# Patient Record
Sex: MALE | Race: WHITE | NOT HISPANIC OR LATINO | Employment: OTHER | URBAN - METROPOLITAN AREA
[De-identification: names, ages, dates, MRNs, and addresses within clinical notes are randomized per-mention and may not be internally consistent; named-entity substitution may affect disease eponyms.]

---

## 2018-01-09 ENCOUNTER — HOSPITAL ENCOUNTER (OUTPATIENT)
Dept: LAB | Facility: MEDICAL CENTER | Age: 53
End: 2018-01-09
Attending: PHYSICIAN ASSISTANT
Payer: COMMERCIAL

## 2018-01-09 ENCOUNTER — OFFICE VISIT (OUTPATIENT)
Dept: MEDICAL GROUP | Age: 53
End: 2018-01-09
Payer: COMMERCIAL

## 2018-01-09 VITALS
BODY MASS INDEX: 32.64 KG/M2 | TEMPERATURE: 97.4 F | DIASTOLIC BLOOD PRESSURE: 78 MMHG | OXYGEN SATURATION: 94 % | HEART RATE: 78 BPM | WEIGHT: 241 LBS | SYSTOLIC BLOOD PRESSURE: 124 MMHG | HEIGHT: 72 IN

## 2018-01-09 DIAGNOSIS — Z13.220 LIPID SCREENING: ICD-10-CM

## 2018-01-09 DIAGNOSIS — R10.13 DYSPEPSIA: ICD-10-CM

## 2018-01-09 DIAGNOSIS — Z00.00 WELL ADULT EXAM: ICD-10-CM

## 2018-01-09 DIAGNOSIS — Z13.1 DIABETES MELLITUS SCREENING: ICD-10-CM

## 2018-01-09 DIAGNOSIS — Z23 NEED FOR VACCINATION: ICD-10-CM

## 2018-01-09 DIAGNOSIS — Z12.5 SCREENING FOR PROSTATE CANCER: ICD-10-CM

## 2018-01-09 DIAGNOSIS — Z12.11 COLON CANCER SCREENING: ICD-10-CM

## 2018-01-09 DIAGNOSIS — R14.0 BLOATING: ICD-10-CM

## 2018-01-09 DIAGNOSIS — R63.5 WEIGHT GAIN: ICD-10-CM

## 2018-01-09 DIAGNOSIS — E66.9 OBESITY (BMI 30-39.9): ICD-10-CM

## 2018-01-09 DIAGNOSIS — R35.89 POLYURIA: ICD-10-CM

## 2018-01-09 LAB
APPEARANCE UR: CLEAR
BASOPHILS # BLD AUTO: 0.8 % (ref 0–1.8)
BASOPHILS # BLD: 0.05 K/UL (ref 0–0.12)
BILIRUB UR STRIP-MCNC: NEGATIVE MG/DL
COLOR UR AUTO: NORMAL
EOSINOPHIL # BLD AUTO: 0.22 K/UL (ref 0–0.51)
EOSINOPHIL NFR BLD: 3.7 % (ref 0–6.9)
ERYTHROCYTE [DISTWIDTH] IN BLOOD BY AUTOMATED COUNT: 39.5 FL (ref 35.9–50)
GLUCOSE UR STRIP.AUTO-MCNC: NEGATIVE MG/DL
HCT VFR BLD AUTO: 48.9 % (ref 42–52)
HGB BLD-MCNC: 16.1 G/DL (ref 14–18)
IMM GRANULOCYTES # BLD AUTO: 0.01 K/UL (ref 0–0.11)
IMM GRANULOCYTES NFR BLD AUTO: 0.2 % (ref 0–0.9)
KETONES UR STRIP.AUTO-MCNC: NEGATIVE MG/DL
LEUKOCYTE ESTERASE UR QL STRIP.AUTO: NEGATIVE
LYMPHOCYTES # BLD AUTO: 1.62 K/UL (ref 1–4.8)
LYMPHOCYTES NFR BLD: 27.4 % (ref 22–41)
MCH RBC QN AUTO: 29 PG (ref 27–33)
MCHC RBC AUTO-ENTMCNC: 32.9 G/DL (ref 33.7–35.3)
MCV RBC AUTO: 87.9 FL (ref 81.4–97.8)
MONOCYTES # BLD AUTO: 0.58 K/UL (ref 0–0.85)
MONOCYTES NFR BLD AUTO: 9.8 % (ref 0–13.4)
NEUTROPHILS # BLD AUTO: 3.43 K/UL (ref 1.82–7.42)
NEUTROPHILS NFR BLD: 58.1 % (ref 44–72)
NITRITE UR QL STRIP.AUTO: NEGATIVE
NRBC # BLD AUTO: 0 K/UL
NRBC BLD-RTO: 0 /100 WBC
PH UR STRIP.AUTO: 6 [PH] (ref 5–8)
PLATELET # BLD AUTO: 213 K/UL (ref 164–446)
PMV BLD AUTO: 10.5 FL (ref 9–12.9)
PROT UR QL STRIP: NEGATIVE MG/DL
PSA SERPL-MCNC: 0.7 NG/ML (ref 0–4)
RBC # BLD AUTO: 5.56 M/UL (ref 4.7–6.1)
RBC UR QL AUTO: NEGATIVE
SP GR UR STRIP.AUTO: 1.03
TSH SERPL DL<=0.005 MIU/L-ACNC: 3.63 UIU/ML (ref 0.38–5.33)
UROBILINOGEN UR STRIP-MCNC: NEGATIVE MG/DL
WBC # BLD AUTO: 5.9 K/UL (ref 4.8–10.8)

## 2018-01-09 PROCEDURE — 83013 H PYLORI (C-13) BREATH: CPT

## 2018-01-09 PROCEDURE — 85025 COMPLETE CBC W/AUTO DIFF WBC: CPT

## 2018-01-09 PROCEDURE — 90715 TDAP VACCINE 7 YRS/> IM: CPT | Performed by: PHYSICIAN ASSISTANT

## 2018-01-09 PROCEDURE — 99396 PREV VISIT EST AGE 40-64: CPT | Mod: 25 | Performed by: PHYSICIAN ASSISTANT

## 2018-01-09 PROCEDURE — 36415 COLL VENOUS BLD VENIPUNCTURE: CPT

## 2018-01-09 PROCEDURE — 84443 ASSAY THYROID STIM HORMONE: CPT

## 2018-01-09 PROCEDURE — 80061 LIPID PANEL: CPT

## 2018-01-09 PROCEDURE — 81002 URINALYSIS NONAUTO W/O SCOPE: CPT | Performed by: PHYSICIAN ASSISTANT

## 2018-01-09 PROCEDURE — 84153 ASSAY OF PSA TOTAL: CPT

## 2018-01-09 PROCEDURE — 90471 IMMUNIZATION ADMIN: CPT | Performed by: PHYSICIAN ASSISTANT

## 2018-01-09 PROCEDURE — 80053 COMPREHEN METABOLIC PANEL: CPT

## 2018-01-09 PROCEDURE — 90472 IMMUNIZATION ADMIN EACH ADD: CPT | Performed by: PHYSICIAN ASSISTANT

## 2018-01-09 PROCEDURE — 90686 IIV4 VACC NO PRSV 0.5 ML IM: CPT | Performed by: PHYSICIAN ASSISTANT

## 2018-01-09 RX ORDER — FLUTICASONE PROPIONATE 50 MCG
1 SPRAY, SUSPENSION (ML) NASAL DAILY
COMMUNITY
End: 2020-09-26

## 2018-01-09 ASSESSMENT — PATIENT HEALTH QUESTIONNAIRE - PHQ9: CLINICAL INTERPRETATION OF PHQ2 SCORE: 0

## 2018-01-09 NOTE — PROGRESS NOTES
"Subjective:     CC:   Chief Complaint   Patient presents with   • Annual Exam     Would like blood work ordered       HPI:   Zachery Esquivel is a 52 y.o. male who presents for an annual exam    Last colonoscopy: 2000--normal  Last Tdap:  ? Would like today  Regular exercise: Reports he is exercising 4 days a week--- hiking 3 mile loop. During the spring and summer he does it every night. Hunts and fishes every weekend.   Diet: \"I have a problem with sugar.\"  Reports 50 lbs weight gain in the last four years.     Retiring in August.      He  has a past medical history of S/P laparoscopic fundoplication.  He  has a past surgical history that includes abdominal exploration; nissen fundoplication laparoscopic; appendectomy; cholecystectomy; and lumbar laminectomy diskectomy.  Family History   Problem Relation Age of Onset   • Diabetes Father    • Diabetes Paternal Grandmother    • Cancer Sister 35     breast   • Heart Disease Neg Hx    • Stroke Neg Hx    • Hypertension Neg Hx    • Hyperlipidemia Neg Hx      Social History   Substance Use Topics   • Smoking status: Never Smoker   • Smokeless tobacco: Never Used   • Alcohol use 1.0 oz/week     2 Glasses of wine per week       Patient Active Problem List    Diagnosis Date Noted   • Obesity 06/28/2015   • Polyuria 06/28/2015       Current Outpatient Prescriptions   Medication Sig Dispense Refill   • fluticasone (FLONASE) 50 MCG/ACT nasal spray Spray 1 Spray in nose every day.     • cyclobenzaprine (FLEXERIL) 10 MG TABS Take 1 Tab by mouth 3 times a day as needed for Mild Pain. 30 Tab 0     No current facility-administered medications for this visit.     (including changes today)  Allergies: Penicillins and Tylenol    Review of Systems  Constitutional: Negative for fever, chills and malaise/fatigue.   HENT: Negative for congestion.    Eyes: Negative for pain.   Respiratory: Negative for cough and shortness of breath.    Cardiovascular: Negative for leg swelling. " "  Gastrointestinal: Negative for nausea, vomiting, abdominal pain. + bloating, flatulence and looser stools. Reports a little heartburn  Genitourinary: Negative for dysuria and hematuria. Reports urinary frequency and dark urine despite drinking a lot of water.  Skin: Negative for rash.   Neurological: Negative for dizziness, focal weakness and headaches.   Endo/Heme/Allergies: Does not bruise/bleed easily.   Psychiatric/Behavioral: Negative for depression.  The patient is not nervous/anxious.      Objective:     Vitals:    01/09/18 0744   BP: 124/78   Pulse: 78   Temp: 36.3 °C (97.4 °F)   SpO2: 94%   Weight: 109.3 kg (241 lb)   Height: 1.816 m (5' 11.5\")     Body mass index is 33.14 kg/m².   Wt Readings from Last 4 Encounters:   01/09/18 109.3 kg (241 lb)   06/25/15 102.9 kg (226 lb 12.8 oz)   12/18/12 92.1 kg (203 lb)   11/15/12 96.2 kg (212 lb)       Physical Exam:  Constitutional: Well-developed and well-nourished. Not diaphoretic. No distress. Obese male.   Skin: Skin is warm and dry. No rash noted.  Head: Atraumatic without lesions.  Eyes: Conjunctivae and extraocular motions are normal. Pupils are equal, round, and reactive to light. No scleral icterus.   Ears:  External ears unremarkable. Tympanic membranes clear and intact.  Nose: Nares patent. Septum midline. Turbinates without erythema nor edema. No discharge.   Mouth/Throat: Dentition is good. Tongue normal. Oropharynx is clear and moist. Posterior pharynx without erythema or exudates.  Neck: Supple, trachea midline. Normal range of motion. No thyromegaly present.  No lymphadenopathy--cervical or supraclavicular  Cardiovascular: Regular rate and rhythm, S1 and S2 without murmur, rubs, or gallops.    Chest: Effort normal. Clear to auscultation throughout. No adventitious sounds. No CVA tenderness.  Abdomen: Soft, non tender, and without distention. Active bowel sounds in all four quadrants. No rebound, guarding, masses or HSM.  Extremities: No cyanosis, " clubbing, erythema, nor edema. Distal pulses intact and symmetric.   Neurological: Alert and oriented x 3. DTRs 2+/3 and symmetric. No cranial nerve deficit. 5/5 myotomes. Sensation intact. Negative Rhomberg.  Psychiatric:  Behavior, mood, and affect are appropriate.    Results for orders placed or performed in visit on 01/09/18   POCT Urinalysis   Result Value Ref Range    POC Color Dark Yellow Negative    POC Appearance Clear Negative    POC Leukocyte Esterase Negative Negative    POC Nitrites Negative Negative    POC Urobiligen Negative Negative (0.2) mg/dL    POC Protein Negative Negative mg/dL    POC Urine PH 6.0 5.0 - 8.0    POC Blood Negative Negative    POC Specific Gravity 1.030 <1.005 - >1.030    POC Ketones Negative Negative mg/dL    POC Biliruben Negative Negative mg/dL    POC Glucose Negative Negative mg/dL         Assessment and Plan:     1. Well adult exam - Counseling about diet, exercise, skin care CBC WITH DIFFERENTIAL   2. Obesity (BMI 30-39.9) -Patient's body mass index is 33.14 kg/m². Exercise and nutrition counseling were performed at this visit.  Discussed elimination of sugar at great length as this has worked well for him in the past. Labs ordered for further evaluation of his weight gain. Patient identified as having weight management issue.  Appropriate orders and counseling given.   3. Need for vaccination -VIS given. Informed consent obtained. Vaccine administered in office. INFLUENZA VACCINE QUAD INJ >3Y(PF)    TDAP VACCINE =>8YO IM   4. Colon cancer screening - Educated on importance of colonoscopies for screening of colon cancer. Referral placed to GI for screening colonoscopy.  REFERRAL TO GASTROENTEROLOGY   5. Lipid screening  LIPID PROFILE   6. Bloating - Labs ordered. Encouraged diet modification and over the counter aids for relief. TSH WITH REFLEX TO FT4   7. Dyspepsia - Labs ordered. Encouraged diet modification and over the counter aids for relief. If h. Pylori negative  advised 1 month of tums or PPI. Try diet modification. If returns or no improvement will send to GI for further evaluation. H. PYLORI, UREA BREATH TEST, ADULT   8. Screening for prostate cancer  PROSTATE SPECIFIC AG SCREENING   9. Diabetes mellitus screening  COMP METABOLIC PANEL   10. Weight gain - labs ordered for further evaluation. Encouraged diet modification and change in exercise TSH WITH REFLEX TO FT4   11. Polyuria- UA  Unremarkable--encouraged increase in water intake. Will continue to monitor. Discussed bladder training.  POCT Urinalysis       Labs and vaccinations per orders      Follow-up: Return in about 1 year (around 1/9/2019) for annual exam, sooner if needed or warranted by labs.

## 2018-01-10 LAB
ALBUMIN SERPL BCP-MCNC: 4.6 G/DL (ref 3.2–4.9)
ALBUMIN/GLOB SERPL: 1.9 G/DL
ALP SERPL-CCNC: 38 U/L (ref 30–99)
ALT SERPL-CCNC: 34 U/L (ref 2–50)
ANION GAP SERPL CALC-SCNC: 6 MMOL/L (ref 0–11.9)
AST SERPL-CCNC: 17 U/L (ref 12–45)
BILIRUB SERPL-MCNC: 1 MG/DL (ref 0.1–1.5)
BUN SERPL-MCNC: 15 MG/DL (ref 8–22)
CALCIUM SERPL-MCNC: 9.6 MG/DL (ref 8.5–10.5)
CHLORIDE SERPL-SCNC: 108 MMOL/L (ref 96–112)
CHOLEST SERPL-MCNC: 173 MG/DL (ref 100–199)
CO2 SERPL-SCNC: 26 MMOL/L (ref 20–33)
CREAT SERPL-MCNC: 0.97 MG/DL (ref 0.5–1.4)
GLOBULIN SER CALC-MCNC: 2.4 G/DL (ref 1.9–3.5)
GLUCOSE SERPL-MCNC: 103 MG/DL (ref 65–99)
HDLC SERPL-MCNC: 45 MG/DL
LDLC SERPL CALC-MCNC: 99 MG/DL
POTASSIUM SERPL-SCNC: 4.3 MMOL/L (ref 3.6–5.5)
PROT SERPL-MCNC: 7 G/DL (ref 6–8.2)
SODIUM SERPL-SCNC: 140 MMOL/L (ref 135–145)
TRIGL SERPL-MCNC: 144 MG/DL (ref 0–149)
UREA BREATH TEST QL: NEGATIVE

## 2019-03-01 ENCOUNTER — OFFICE VISIT (OUTPATIENT)
Dept: URGENT CARE | Facility: MEDICAL CENTER | Age: 54
End: 2019-03-01
Payer: COMMERCIAL

## 2019-03-01 VITALS
BODY MASS INDEX: 32.51 KG/M2 | TEMPERATURE: 98.1 F | HEIGHT: 72 IN | DIASTOLIC BLOOD PRESSURE: 86 MMHG | HEART RATE: 105 BPM | SYSTOLIC BLOOD PRESSURE: 122 MMHG | OXYGEN SATURATION: 94 % | WEIGHT: 240 LBS

## 2019-03-01 DIAGNOSIS — R35.0 URINARY FREQUENCY: ICD-10-CM

## 2019-03-01 DIAGNOSIS — J06.9 URI WITH COUGH AND CONGESTION: ICD-10-CM

## 2019-03-01 DIAGNOSIS — J01.40 ACUTE NON-RECURRENT PANSINUSITIS: Primary | ICD-10-CM

## 2019-03-01 DIAGNOSIS — J40 BRONCHITIS: ICD-10-CM

## 2019-03-01 LAB
APPEARANCE UR: CLEAR
BILIRUB UR STRIP-MCNC: NEGATIVE MG/DL
COLOR UR AUTO: YELLOW
GLUCOSE UR STRIP.AUTO-MCNC: NEGATIVE MG/DL
KETONES UR STRIP.AUTO-MCNC: NEGATIVE MG/DL
LEUKOCYTE ESTERASE UR QL STRIP.AUTO: NEGATIVE
NITRITE UR QL STRIP.AUTO: NEGATIVE
PH UR STRIP.AUTO: 5.5 [PH] (ref 5–8)
PROT UR QL STRIP: NEGATIVE MG/DL
RBC UR QL AUTO: NEGATIVE
SP GR UR STRIP.AUTO: 1.01
UROBILINOGEN UR STRIP-MCNC: 0.2 MG/DL

## 2019-03-01 PROCEDURE — 81002 URINALYSIS NONAUTO W/O SCOPE: CPT | Performed by: PHYSICIAN ASSISTANT

## 2019-03-01 PROCEDURE — 99214 OFFICE O/P EST MOD 30 MIN: CPT | Performed by: PHYSICIAN ASSISTANT

## 2019-03-01 RX ORDER — METHYLPREDNISOLONE 4 MG/1
TABLET ORAL
Qty: 21 TAB | Refills: 0 | Status: SHIPPED | OUTPATIENT
Start: 2019-03-01 | End: 2020-09-27

## 2019-03-01 RX ORDER — SULFAMETHOXAZOLE AND TRIMETHOPRIM 800; 160 MG/1; MG/1
1 TABLET ORAL 2 TIMES DAILY
Qty: 28 TAB | Refills: 0 | Status: SHIPPED | OUTPATIENT
Start: 2019-03-01 | End: 2019-03-02

## 2019-03-01 NOTE — PROGRESS NOTES
Subjective:      Pt is a 53 y.o. male who presents with Sinus Problem (sinus pressure behind eyes and in ears/ congestion ) and UTI (frequency and warmth)            HPI  This is a new problem. PT presents to  clinic today complaining of sore throat, watery eyes, pressure in ears, cough, fatigue, runny nose, post nasal drip, sinus pressure and headache. PT denies CP, SOB, NVD, abdominal pain, joint pain. PT states these symptoms began around 2 days ago. PT states the pain is a 6/10, aching in nature and worse at night.  Pt has not taken any RX medications for this condition. Pt also notes 1-2 months of urinary frequency, dysuria, and warm sensation while urinating. The pt's medication list, problem list, and allergies have been evaluated and reviewed during today's visit.    PMH:  Past Medical History:   Diagnosis Date   • S/P laparoscopic fundoplication        PSH:  Past Surgical History:   Procedure Laterality Date   • ABDOMINAL EXPLORATION     • APPENDECTOMY     • CHOLECYSTECTOMY     • LUMBAR LAMINECTOMY DISKECTOMY      l4-5 with fusion   • NISSEN FUNDOPLICATION LAPAROSCOPIC         Fam Hx:    family history includes Cancer (age of onset: 35) in his sister; Diabetes in his father and paternal grandmother.  Family Status   Relation Status   • Fa Alive   • Sis Alive   • PGMo Alive   • Mo Alive   • Bro Alive   • Sis Alive   • Neg Hx (Not Specified)       Soc HX:  Social History     Social History   • Marital status:      Spouse name: N/A   • Number of children: N/A   • Years of education: N/A     Occupational History   • Not on file.     Social History Main Topics   • Smoking status: Never Smoker   • Smokeless tobacco: Never Used   • Alcohol use 1.0 oz/week     2 Glasses of wine per week   • Drug use: No   • Sexual activity: Yes     Partners: Female     Other Topics Concern   • Not on file     Social History Narrative   • No narrative on file         Medications:    Current Outpatient Prescriptions:   •   "fluticasone (FLONASE) 50 MCG/ACT nasal spray, Spray 1 Spray in nose every day., Disp: , Rfl:       Allergies:  Penicillins and Tylenol      ROS  Review of Systems   Constitutional: Positive for malaise/fatigue. Negative for fever.   HENT: Positive for congestion and sore throat from postnasal drip with associated sinus pressure and fullness.    Eyes: Negative for blurred vision, double vision and photophobia.   Respiratory: Positive for cough and sputum production. Negative for hemoptysis, shortness of breath and wheezing.    Cardiovascular: Negative for chest pain and palpitations.   Gastrointestinal: Negative for nausea, vomiting, abdominal pain, diarrhea and constipation.   Genitourinary: POS dysuria and urinary frequency.   Musculoskeletal: Negative for joint pain and myalgias.   Skin: Negative for itching and rash.   Neurological: Positive for headaches. Negative for dizziness and tingling.   Endo/Heme/Allergies: Does not bruise/bleed easily.   Psychiatric/Behavioral: Negative for depression. The patient is not nervous/anxious.           Objective:     /86   Pulse (!) 105   Temp 36.7 °C (98.1 °F) (Temporal)   Ht 1.816 m (5' 11.5\")   Wt 108.9 kg (240 lb)   SpO2 94%   BMI 33.01 kg/m²      Physical Exam         Physical Exam   Constitutional: Pt is oriented to person, place, and time. Pt appears well-developed and well-nourished. No distress.   HENT:   Head: Normocephalic and atraumatic.   Right Ear: Hearing, tympanic membrane, external ear and ear canal normal.   Left Ear: Hearing, tympanic membrane, external ear and ear canal normal.   Nose: Mucosal edema, rhinorrhea and sinus tenderness present. No nose lacerations, nasal deformity, septal deviation or nasal septal hematoma. No epistaxis.  No foreign bodies. Right sinus exhibits maxillary sinus tenderness and frontal sinus tenderness. Left sinus exhibits maxillary sinus tenderness and frontal sinus tenderness.   Mouth/Throat: Oropharynx is clear and " moist. No oropharyngeal exudate.   Eyes: Conjunctivae normal and EOM are normal. Pupils are equal, round, and reactive to light. Right eye exhibits no discharge. Left eye exhibits no discharge.   Neck: Normal range of motion. Neck supple. No tracheal deviation present. No thyromegaly present.   Cardiovascular: Normal rate, regular rhythm, normal heart sounds and intact distal pulses.  Exam reveals no gallop and no friction rub.    No murmur heard.  Pulmonary/Chest: Effort normal and breath sounds normal. No respiratory distress. Pt has no wheezes. Pt has no rales. Pt exhibits no tenderness.   Abdominal: Soft. Bowel sounds are normal. Pt exhibits no distension and no mass. There is no tenderness. There is no rebound and no guarding.   Musculoskeletal: Normal range of motion. Pt exhibits no edema and no tenderness.   : pt defers  Lymphadenopathy:     Pt has no cervical adenopathy.   Neurological: Pt is alert and oriented to person, place, and time. Pt has normal reflexes. Pt displays normal reflexes. No cranial nerve deficit. Pt exhibits normal muscle tone. Coordination normal.   Skin: Skin is warm and dry. No rash noted. No erythema.   Psychiatric: Pt has a normal mood and affect. Pt behavior is normal. Judgment and thought content normal.          Assessment/Plan:     1. Acute non-recurrent pansinusitis      2. URI with cough and congestion      3. Bronchitis      4. Urinary frequency    - POCT Urinalysis-->WNL    Suspect prostatitis and will order PSA and place on Bactrim for sinuses and prostate-likely issues  Referral to Urology  Rest, fluids encouraged.  OTC decongestant for congestion/cough  AVS with medical info given.  Pt was in full understanding and agreement with the plan.  Differential diagnosis, natural history, supportive care, and indications for immediate follow-up discussed. All questions answered. Patient agrees with the plan of care.  Follow-up as needed if symptoms worsen or fail to  improve.    3/2/19: Addendum: Patient contacted clinic stating that since taking the Bactrim he has been having a similar reaction to his other medication allergies including itching of the palms and soles.  He denies any breathing difficulty or wheezing.  Options discussed.  We will have him discontinue the Bactrim and trial Omnicef.  I did  the patient on the potential cross-reactivity with penicillin allergy.  Patient verbalizes understanding and is willing to give this a try.  If he does have a problem he will notify the clinic and we will change him to Biaxin.  CLINT Lu PA-C

## 2019-03-01 NOTE — PATIENT INSTRUCTIONS
Benign Prostatic Hyperplasia  An enlarged prostate (benign prostatic hyperplasia) is common in older men. You may experience the following:  · Weak urine stream.  · Dribbling.  · Feeling like the bladder has not emptied completely.  · Difficulty starting urination.  · Getting up frequently at night to urinate.  · Urinating more frequently during the day.  HOME CARE INSTRUCTIONS   Monitor your prostatic hyperplasia for any changes. The following actions may help to alleviate any discomfort you are experiencing:  · Give yourself time when you urinate.  · Stay away from alcohol.  · Avoid beverages containing caffeine, such as coffee, tea, and yarely, because they can make the problem worse.  · Avoid decongestants, antihistamines, and some prescription medicines that can make the problem worse.  · Follow up with your health care provider for further treatment as recommended.  SEEK MEDICAL CARE IF:  · You are experiencing progressive difficulty voiding.  · Your urine stream is progressively getting narrower.  · You are awaking from sleep with the urge to void more frequently.  · You are constantly feeling the need to void.  · You experience loss of urine, especially in small amounts.  SEEK IMMEDIATE MEDICAL CARE IF:   · You develop increased pain with urination or are unable to urinate.  · You develop severe abdominal pain, vomiting, a high fever, or fainting.  · You develop back pain or blood in your urine.  MAKE SURE YOU:   · Understand these instructions.  · Will watch your condition.  · Will get help right away if you are not doing well or get worse.  This information is not intended to replace advice given to you by your health care provider. Make sure you discuss any questions you have with your health care provider.  Document Released: 12/18/2006 Document Revised: 01/08/2016 Document Reviewed: 05/20/2014  [employer] Interactive Patient Education © 2017 [employer]

## 2019-03-02 ENCOUNTER — TELEPHONE (OUTPATIENT)
Dept: URGENT CARE | Facility: MEDICAL CENTER | Age: 54
End: 2019-03-02

## 2019-03-02 RX ORDER — CEFDINIR 300 MG/1
300 CAPSULE ORAL 2 TIMES DAILY
Qty: 20 CAP | Refills: 0 | Status: SHIPPED | OUTPATIENT
Start: 2019-03-02 | End: 2019-03-12

## 2019-03-02 NOTE — TELEPHONE ENCOUNTER
10:31 AM    Calling because the medication that Luisito Ventura prescribed him for bronchitis is giving him a rash on his hands and feet. Since Yogesh is not working today I informed him I would ask the physician that is working today and see what she says. She told me to send her the message and she would give him a call back with what she can do for him.    -Page Martinez

## 2020-09-26 ENCOUNTER — HOSPITAL ENCOUNTER (EMERGENCY)
Facility: MEDICAL CENTER | Age: 55
End: 2020-09-26
Attending: EMERGENCY MEDICINE
Payer: COMMERCIAL

## 2020-09-26 ENCOUNTER — APPOINTMENT (OUTPATIENT)
Dept: RADIOLOGY | Facility: MEDICAL CENTER | Age: 55
End: 2020-09-26
Attending: EMERGENCY MEDICINE
Payer: COMMERCIAL

## 2020-09-26 VITALS
HEART RATE: 80 BPM | WEIGHT: 235.89 LBS | DIASTOLIC BLOOD PRESSURE: 94 MMHG | SYSTOLIC BLOOD PRESSURE: 149 MMHG | TEMPERATURE: 97.6 F | HEIGHT: 72 IN | BODY MASS INDEX: 31.95 KG/M2 | RESPIRATION RATE: 18 BRPM | OXYGEN SATURATION: 95 %

## 2020-09-26 DIAGNOSIS — M54.32 BILATERAL SCIATICA: ICD-10-CM

## 2020-09-26 DIAGNOSIS — S39.012A STRAIN OF LUMBAR REGION, INITIAL ENCOUNTER: ICD-10-CM

## 2020-09-26 DIAGNOSIS — M54.31 BILATERAL SCIATICA: ICD-10-CM

## 2020-09-26 LAB
ALBUMIN SERPL BCP-MCNC: 4.7 G/DL (ref 3.2–4.9)
ALBUMIN/GLOB SERPL: 1.7 G/DL
ALP SERPL-CCNC: 52 U/L (ref 30–99)
ALT SERPL-CCNC: 32 U/L (ref 2–50)
ANION GAP SERPL CALC-SCNC: 13 MMOL/L (ref 7–16)
APPEARANCE UR: CLEAR
AST SERPL-CCNC: 20 U/L (ref 12–45)
BASOPHILS # BLD AUTO: 0.7 % (ref 0–1.8)
BASOPHILS # BLD: 0.06 K/UL (ref 0–0.12)
BILIRUB SERPL-MCNC: 0.9 MG/DL (ref 0.1–1.5)
BILIRUB UR QL STRIP.AUTO: NEGATIVE
BUN SERPL-MCNC: 15 MG/DL (ref 8–22)
CALCIUM SERPL-MCNC: 9.5 MG/DL (ref 8.4–10.2)
CHLORIDE SERPL-SCNC: 103 MMOL/L (ref 96–112)
CK SERPL-CCNC: 120 U/L (ref 0–154)
CO2 SERPL-SCNC: 24 MMOL/L (ref 20–33)
COLOR UR: YELLOW
CREAT SERPL-MCNC: 1.03 MG/DL (ref 0.5–1.4)
EOSINOPHIL # BLD AUTO: 0.13 K/UL (ref 0–0.51)
EOSINOPHIL NFR BLD: 1.4 % (ref 0–6.9)
ERYTHROCYTE [DISTWIDTH] IN BLOOD BY AUTOMATED COUNT: 38.1 FL (ref 35.9–50)
GLOBULIN SER CALC-MCNC: 2.7 G/DL (ref 1.9–3.5)
GLUCOSE SERPL-MCNC: 102 MG/DL (ref 65–99)
GLUCOSE UR STRIP.AUTO-MCNC: NEGATIVE MG/DL
HCT VFR BLD AUTO: 48.5 % (ref 42–52)
HGB BLD-MCNC: 16.3 G/DL (ref 14–18)
IMM GRANULOCYTES # BLD AUTO: 0.02 K/UL (ref 0–0.11)
IMM GRANULOCYTES NFR BLD AUTO: 0.2 % (ref 0–0.9)
KETONES UR STRIP.AUTO-MCNC: NEGATIVE MG/DL
LEUKOCYTE ESTERASE UR QL STRIP.AUTO: NEGATIVE
LYMPHOCYTES # BLD AUTO: 1.86 K/UL (ref 1–4.8)
LYMPHOCYTES NFR BLD: 20.5 % (ref 22–41)
MCH RBC QN AUTO: 29.6 PG (ref 27–33)
MCHC RBC AUTO-ENTMCNC: 33.6 G/DL (ref 33.7–35.3)
MCV RBC AUTO: 88 FL (ref 81.4–97.8)
MICRO URNS: NORMAL
MONOCYTES # BLD AUTO: 0.83 K/UL (ref 0–0.85)
MONOCYTES NFR BLD AUTO: 9.1 % (ref 0–13.4)
NEUTROPHILS # BLD AUTO: 6.18 K/UL (ref 1.82–7.42)
NEUTROPHILS NFR BLD: 68.1 % (ref 44–72)
NITRITE UR QL STRIP.AUTO: NEGATIVE
NRBC # BLD AUTO: 0 K/UL
NRBC BLD-RTO: 0 /100 WBC
PH UR STRIP.AUTO: 5 [PH] (ref 5–8)
PLATELET # BLD AUTO: 188 K/UL (ref 164–446)
PMV BLD AUTO: 9.8 FL (ref 9–12.9)
POTASSIUM SERPL-SCNC: 4.3 MMOL/L (ref 3.6–5.5)
PROT SERPL-MCNC: 7.4 G/DL (ref 6–8.2)
PROT UR QL STRIP: NEGATIVE MG/DL
RBC # BLD AUTO: 5.51 M/UL (ref 4.7–6.1)
RBC UR QL AUTO: NEGATIVE
SODIUM SERPL-SCNC: 140 MMOL/L (ref 135–145)
SP GR UR STRIP.AUTO: 1.02
WBC # BLD AUTO: 9.1 K/UL (ref 4.8–10.8)

## 2020-09-26 PROCEDURE — 700111 HCHG RX REV CODE 636 W/ 250 OVERRIDE (IP): Performed by: EMERGENCY MEDICINE

## 2020-09-26 PROCEDURE — 99284 EMERGENCY DEPT VISIT MOD MDM: CPT

## 2020-09-26 PROCEDURE — 36415 COLL VENOUS BLD VENIPUNCTURE: CPT

## 2020-09-26 PROCEDURE — 72131 CT LUMBAR SPINE W/O DYE: CPT

## 2020-09-26 PROCEDURE — 80053 COMPREHEN METABOLIC PANEL: CPT

## 2020-09-26 PROCEDURE — 82550 ASSAY OF CK (CPK): CPT

## 2020-09-26 PROCEDURE — 74176 CT ABD & PELVIS W/O CONTRAST: CPT

## 2020-09-26 PROCEDURE — 96374 THER/PROPH/DIAG INJ IV PUSH: CPT

## 2020-09-26 PROCEDURE — 85025 COMPLETE CBC W/AUTO DIFF WBC: CPT

## 2020-09-26 PROCEDURE — 81003 URINALYSIS AUTO W/O SCOPE: CPT

## 2020-09-26 PROCEDURE — 96375 TX/PRO/DX INJ NEW DRUG ADDON: CPT

## 2020-09-26 RX ORDER — HYDROMORPHONE HYDROCHLORIDE 1 MG/ML
1 INJECTION, SOLUTION INTRAMUSCULAR; INTRAVENOUS; SUBCUTANEOUS ONCE
Status: DISCONTINUED | OUTPATIENT
Start: 2020-09-26 | End: 2020-09-26

## 2020-09-26 RX ORDER — HYDROMORPHONE HYDROCHLORIDE 1 MG/ML
1 INJECTION, SOLUTION INTRAMUSCULAR; INTRAVENOUS; SUBCUTANEOUS
Status: DISCONTINUED | OUTPATIENT
Start: 2020-09-26 | End: 2020-09-26 | Stop reason: HOSPADM

## 2020-09-26 RX ORDER — ONDANSETRON 2 MG/ML
4 INJECTION INTRAMUSCULAR; INTRAVENOUS ONCE
Status: COMPLETED | OUTPATIENT
Start: 2020-09-26 | End: 2020-09-26

## 2020-09-26 RX ORDER — KETOROLAC TROMETHAMINE 30 MG/ML
30 INJECTION, SOLUTION INTRAMUSCULAR; INTRAVENOUS ONCE
Status: COMPLETED | OUTPATIENT
Start: 2020-09-26 | End: 2020-09-26

## 2020-09-26 RX ORDER — OXYCODONE HYDROCHLORIDE 5 MG/1
5 TABLET ORAL EVERY 4 HOURS PRN
Qty: 18 TAB | Refills: 0 | Status: SHIPPED | OUTPATIENT
Start: 2020-09-26 | End: 2020-09-29

## 2020-09-26 RX ORDER — KETOROLAC TROMETHAMINE 30 MG/ML
30 INJECTION, SOLUTION INTRAMUSCULAR; INTRAVENOUS ONCE
Status: DISCONTINUED | OUTPATIENT
Start: 2020-09-26 | End: 2020-09-26

## 2020-09-26 RX ORDER — LORAZEPAM 2 MG/ML
1 INJECTION INTRAMUSCULAR ONCE
Status: COMPLETED | OUTPATIENT
Start: 2020-09-26 | End: 2020-09-26

## 2020-09-26 RX ORDER — TIZANIDINE 4 MG/1
4 TABLET ORAL EVERY 6 HOURS PRN
Qty: 30 TAB | Refills: 3 | Status: SHIPPED | OUTPATIENT
Start: 2020-09-26 | End: 2020-10-01

## 2020-09-26 RX ADMIN — LORAZEPAM 1 MG: 2 INJECTION INTRAMUSCULAR; INTRAVENOUS at 14:38

## 2020-09-26 RX ADMIN — HYDROMORPHONE HYDROCHLORIDE 1 MG: 1 INJECTION, SOLUTION INTRAMUSCULAR; INTRAVENOUS; SUBCUTANEOUS at 14:39

## 2020-09-26 RX ADMIN — ONDANSETRON 4 MG: 2 INJECTION INTRAMUSCULAR; INTRAVENOUS at 14:39

## 2020-09-26 RX ADMIN — KETOROLAC TROMETHAMINE 30 MG: 30 INJECTION, SOLUTION INTRAMUSCULAR at 14:39

## 2020-09-26 NOTE — ED NOTES
Dc instructions and medications discussed with patient at bedside. All questions answered at this time. VSS. No IV in place at this time. Pt to lobby without incident. spouse to drive patient home.   Informed consent for narcotics reviewed with patient and spouse.

## 2020-09-26 NOTE — ED NOTES
Pt unable to sit still or lay in bed. Pt resorts to rocking on stool to attempt to manage back pain.

## 2020-09-26 NOTE — ED TRIAGE NOTES
Presents complaining of lower back chronic pain recurring for years.  He reports acute exacerbation referred to his legs escalating since yesterday.  He is currently under physical therapy consult.   Chief Complaint   Patient presents with   • Low Back Pain   • Leg Pain     /85   Pulse 80   Temp 36.4 °C (97.6 °F) (Temporal)   Resp 18   Ht 1.829 m (6')   Wt 107 kg (235 lb 14.3 oz)   SpO2 96%   BMI 31.99 kg/m²

## 2020-09-26 NOTE — ED PROVIDER NOTES
ED Provider Note    CHIEF COMPLAINT  Chief Complaint   Patient presents with   • Low Back Pain   • Leg Pain       HPI  Zachery Esquivel is a 54 y.o. male who presents to the ED with low back pain.  The patient has a history of chronic low back pain, has had surgery on his back previously.  His back is been hurting for the last 2 months, 2 weeks ago the pain got worse to where he went to the [place] urgent care, they gave him an IM shot of Toradol, give him some steroids and muscle relaxers, the patient was feeling improved, actually off all medicines until this morning when he woke up.  He has sharp severe pain center of his lower back with burning in bilateral hamstrings, worse with movement, no numbness, tingling, weakness, no hematuria or dysuria, no abdominal pains, nausea vomiting, chest pain, shortness of breath, fevers.  Patient denies any trauma.    REVIEW OF SYSTEMS  See HPI for further details. All other systems are negative.     PAST MEDICAL HISTORY  Past Medical History:   Diagnosis Date   • Low back pain    • S/P laparoscopic fundoplication        FAMILY HISTORY  Family History   Problem Relation Age of Onset   • Diabetes Father    • Diabetes Paternal Grandmother    • Cancer Sister 35        breast   • Heart Disease Neg Hx    • Stroke Neg Hx    • Hypertension Neg Hx    • Hyperlipidemia Neg Hx        SOCIAL HISTORY  Social History     Socioeconomic History   • Marital status:      Spouse name: Not on file   • Number of children: Not on file   • Years of education: Not on file   • Highest education level: Not on file   Occupational History   • Not on file   Social Needs   • Financial resource strain: Not on file   • Food insecurity     Worry: Not on file     Inability: Not on file   • Transportation needs     Medical: Not on file     Non-medical: Not on file   Tobacco Use   • Smoking status: Never Smoker   • Smokeless tobacco: Never Used   Substance and Sexual Activity   • Alcohol use: Yes      Alcohol/week: 1.0 oz     Types: 2 Glasses of wine per week     Comment: Occasionally   • Drug use: No   • Sexual activity: Yes     Partners: Female   Lifestyle   • Physical activity     Days per week: Not on file     Minutes per session: Not on file   • Stress: Not on file   Relationships   • Social connections     Talks on phone: Not on file     Gets together: Not on file     Attends Mu-ism service: Not on file     Active member of club or organization: Not on file     Attends meetings of clubs or organizations: Not on file     Relationship status: Not on file   • Intimate partner violence     Fear of current or ex partner: Not on file     Emotionally abused: Not on file     Physically abused: Not on file     Forced sexual activity: Not on file   Other Topics Concern   • Not on file   Social History Narrative   • Not on file       SURGICAL HISTORY  Past Surgical History:   Procedure Laterality Date   • ABDOMINAL EXPLORATION     • APPENDECTOMY     • CHOLECYSTECTOMY     • LUMBAR LAMINECTOMY DISKECTOMY      l4-5 with fusion   • NISSEN FUNDOPLICATION LAPAROSCOPIC         CURRENT MEDICATIONS  Home Medications     Reviewed by Nelly Downey (Pharmacy Tech) on 09/26/20 at 1457  Med List Status: Partial   Medication Last Dose Status   MethylPREDNISolone (MEDROL DOSEPAK) 4 MG Tablet Therapy Pack 9/16/2020 Active   NON SPECIFIED 9/26/2020 Active   NON SPECIFIED 9/26/2020 Active   VITAMIN D PO 9/25/2020 Active                ALLERGIES  Allergies   Allergen Reactions   • Penicillins Rash     .   • Tylenol      Can't take due to elevated liver enzymes   • Bactrim [Sulfamethoxazole W-Trimethoprim]      Itching rash palms and soles       PHYSICAL EXAM  VITAL SIGNS: /94   Pulse 80   Temp 36.4 °C (97.6 °F) (Temporal)   Resp 18   Ht 1.829 m (6')   Wt 107 kg (235 lb 14.3 oz)   SpO2 95%   BMI 31.99 kg/m²   Constitutional: Well developed, Well nourished, moderate to severe distress, Non-toxic appearance.   HENT:   Atraumatic, Normocephalic, Oral pharynx with moist mucous membranes.   Eyes:  EOMI, PERRL, no scleral icterus.  Neck: Normal range of motion, No tenderness, Supple, No stridor.   Cardiovascular: Normal heart rate, Normal rhythm,   Thorax & Lungs: Normal breath sounds, No respiratory distress, No wheezing, No chest tenderness.   Abdomen: Bowel sounds normal, Soft, No tenderness, No masses, No pulsatile masses.   Skin: Warm, Dry, No erythema, No rash.   Back: Midline lumbar scar, tenderness palpation midline, paraspinal, bilateral gluteal area, the patient also has slight tenderness palpation is bilateral hamstring area.  Patient has difficulty lying down flat  Extremities: Intact distal pulses, No edema, No tenderness.   Neurologic: Alert & oriented x 3, Normal motor function, Normal sensory function, No focal deficits noted. Gait wide based but no ataxia    RADIOLOGY/PROCEDURES  CT-RENAL COLIC EVALUATION(A/P W/O)   Final Result      1.  Negative for renal or ureteral calculi      2.  Prior cholecystectomy. No biliary dilation.      3.  Lumbar spine facet arthropathy and L4-5 postoperative changes      CT-LSPINE W/O PLUS RECONS   Final Result      1.  Bilateral L4 and L5 pedicle screw and left L5 laminectomy. Within normal limits      2.  Facet arthropathy        Results for orders placed or performed during the hospital encounter of 09/26/20   URINALYSIS CULTURE, IF INDICATED    Specimen: Urine, Clean Catch   Result Value Ref Range    Color Yellow     Character Clear     Specific Gravity 1.025 <1.035    Ph 5.0 5.0 - 8.0    Glucose Negative Negative mg/dL    Ketones Negative Negative mg/dL    Protein Negative Negative mg/dL    Bilirubin Negative Negative    Nitrite Negative Negative    Leukocyte Esterase Negative Negative    Occult Blood Negative Negative    Micro Urine Req see below    CBC WITH DIFFERENTIAL   Result Value Ref Range    WBC 9.1 4.8 - 10.8 K/uL    RBC 5.51 4.70 - 6.10 M/uL    Hemoglobin 16.3 14.0 -  18.0 g/dL    Hematocrit 48.5 42.0 - 52.0 %    MCV 88.0 81.4 - 97.8 fL    MCH 29.6 27.0 - 33.0 pg    MCHC 33.6 (L) 33.7 - 35.3 g/dL    RDW 38.1 35.9 - 50.0 fL    Platelet Count 188 164 - 446 K/uL    MPV 9.8 9.0 - 12.9 fL    Neutrophils-Polys 68.10 44.00 - 72.00 %    Lymphocytes 20.50 (L) 22.00 - 41.00 %    Monocytes 9.10 0.00 - 13.40 %    Eosinophils 1.40 0.00 - 6.90 %    Basophils 0.70 0.00 - 1.80 %    Immature Granulocytes 0.20 0.00 - 0.90 %    Nucleated RBC 0.00 /100 WBC    Neutrophils (Absolute) 6.18 1.82 - 7.42 K/uL    Lymphs (Absolute) 1.86 1.00 - 4.80 K/uL    Monos (Absolute) 0.83 0.00 - 0.85 K/uL    Eos (Absolute) 0.13 0.00 - 0.51 K/uL    Baso (Absolute) 0.06 0.00 - 0.12 K/uL    Immature Granulocytes (abs) 0.02 0.00 - 0.11 K/uL    NRBC (Absolute) 0.00 K/uL   COMP METABOLIC PANEL   Result Value Ref Range    Sodium 140 135 - 145 mmol/L    Potassium 4.3 3.6 - 5.5 mmol/L    Chloride 103 96 - 112 mmol/L    Co2 24 20 - 33 mmol/L    Anion Gap 13.0 7.0 - 16.0    Glucose 102 (H) 65 - 99 mg/dL    Bun 15 8 - 22 mg/dL    Creatinine 1.03 0.50 - 1.40 mg/dL    Calcium 9.5 8.4 - 10.2 mg/dL    AST(SGOT) 20 12 - 45 U/L    ALT(SGPT) 32 2 - 50 U/L    Alkaline Phosphatase 52 30 - 99 U/L    Total Bilirubin 0.9 0.1 - 1.5 mg/dL    Albumin 4.7 3.2 - 4.9 g/dL    Total Protein 7.4 6.0 - 8.2 g/dL    Globulin 2.7 1.9 - 3.5 g/dL    A-G Ratio 1.7 g/dL   CREATINE KINASE   Result Value Ref Range    CPK Total 120 0 - 154 U/L   ESTIMATED GFR   Result Value Ref Range    GFR If African American >60 >60 mL/min/1.73 m 2    GFR If Non African American >60 >60 mL/min/1.73 m 2        COURSE & MEDICAL DECISION MAKING  Pertinent Labs & Imaging studies reviewed. (See chart for details)  Patient is coming in with low back pain, the patient is squirming in the chair, is having muscle spasms, concerned somewhat about kidney stone due to his presentation, will get a CT stone study, reconstruction on the L-spine.  We will treat the patient with Toradol,  AtAlex webbaudid    Patient feeling much improved after IV therapies, CT scans were unremarkable, will give the patient a prescription for some pain medicines, muscle relaxers, he has a follow-up with his outpatient provider in approximately week, return with worsening symptoms.    I reviewed prescription monitoring program for patient's narcotic use before prescribing a scheduled drug.The patient will not drink alcohol nor drive with prescribed medications.} The patient will return for new or worsening symptoms and is stable at the time of discharge.    The patient is referred to a primary physician for blood pressure management, diabetic screening, and for all other preventative health concerns.    In prescribing controlled substances to this patient, I certify that I have obtained and reviewed the medical history of Zachery Esquivel. I have also made a good alexandre effort to obtain applicable records from other providers who have treated the patient and records did not demonstrate any increased risk of substance abuse that would prevent me from prescribing controlled substances.     I have conducted a physical exam and documented it. I have reviewed Mr. Esquivel’s prescription history as maintained by the [place] Prescription Monitoring Program.     I have assessed the patient’s risk for abuse, dependency, and addiction using the validated Opioid Risk Tool available at https://www.mdcalc.com/xewsou-jfjy-gzco-ort-narcotic-abuse.     Given the above, I believe the benefits of controlled substance therapy outweigh the risks. The reasons for prescribing controlled substances include non-narcotic, oral analgesic alternatives have been inadequate for pain control. Accordingly, I have discussed the risk and benefits, treatment plan, and alternative therapies with the patient.         DISPOSITION:  Patient will be discharged home in stable condition.    FOLLOW UP:  [facility], Emergency  Dept  [address]  [place] [address]  [phone]    If symptoms worsen      OUTPATIENT MEDICATIONS:  Discharge Medication List as of 9/26/2020  3:40 PM      START taking these medications    Details   tizanidine (ZANAFLEX) 4 MG Tab Take 1 Tab by mouth every 6 hours as needed., Disp-30 Tab,R-3, Normal      oxyCODONE immediate-release (ROXICODONE) 5 MG Tab Take 1 Tab by mouth every four hours as needed for Severe Pain for up to 3 days., Disp-18 Tab,R-0, Normal                   FINAL IMPRESSION  1. Strain of lumbar region, initial encounter    2. Bilateral sciatica    .    Patient referred to primary care provider for blood pressure management    This dictation was created using voice recognition software. The accuracy of the dictation is limited to the abilities of the software. I expect there may be some errors of grammar and possibly content. The nursing notes were reviewed and certain aspects of this information were incorporated into this note.    Electronically signed by: Riley García M.D., 9/26/2020 2:13 PM

## 2020-09-26 NOTE — ED NOTES
Med rec partially complete per pt at bedside  Interviewed pt with family at bedside with permission from pt  Allergies reviewed and updated.

## 2020-09-27 ENCOUNTER — APPOINTMENT (OUTPATIENT)
Dept: RADIOLOGY | Facility: MEDICAL CENTER | Age: 55
End: 2020-09-27
Attending: EMERGENCY MEDICINE
Payer: COMMERCIAL

## 2020-09-27 ENCOUNTER — HOSPITAL ENCOUNTER (OUTPATIENT)
Facility: MEDICAL CENTER | Age: 55
End: 2020-09-29
Attending: EMERGENCY MEDICINE | Admitting: INTERNAL MEDICINE
Payer: COMMERCIAL

## 2020-09-27 PROBLEM — M54.40 ACUTE LOW BACK PAIN WITH SCIATICA: Status: ACTIVE | Noted: 2020-09-27

## 2020-09-27 PROCEDURE — 700102 HCHG RX REV CODE 250 W/ 637 OVERRIDE(OP): Performed by: INTERNAL MEDICINE

## 2020-09-27 PROCEDURE — 99285 EMERGENCY DEPT VISIT HI MDM: CPT

## 2020-09-27 PROCEDURE — G0378 HOSPITAL OBSERVATION PER HR: HCPCS

## 2020-09-27 PROCEDURE — 700111 HCHG RX REV CODE 636 W/ 250 OVERRIDE (IP): Performed by: EMERGENCY MEDICINE

## 2020-09-27 PROCEDURE — 72148 MRI LUMBAR SPINE W/O DYE: CPT

## 2020-09-27 PROCEDURE — 96375 TX/PRO/DX INJ NEW DRUG ADDON: CPT

## 2020-09-27 PROCEDURE — 700102 HCHG RX REV CODE 250 W/ 637 OVERRIDE(OP): Performed by: EMERGENCY MEDICINE

## 2020-09-27 PROCEDURE — 90471 IMMUNIZATION ADMIN: CPT

## 2020-09-27 PROCEDURE — A9270 NON-COVERED ITEM OR SERVICE: HCPCS | Performed by: EMERGENCY MEDICINE

## 2020-09-27 PROCEDURE — 96374 THER/PROPH/DIAG INJ IV PUSH: CPT

## 2020-09-27 PROCEDURE — 700101 HCHG RX REV CODE 250: Performed by: INTERNAL MEDICINE

## 2020-09-27 PROCEDURE — C9803 HOPD COVID-19 SPEC COLLECT: HCPCS | Performed by: INTERNAL MEDICINE

## 2020-09-27 PROCEDURE — A9270 NON-COVERED ITEM OR SERVICE: HCPCS | Performed by: INTERNAL MEDICINE

## 2020-09-27 PROCEDURE — 96372 THER/PROPH/DIAG INJ SC/IM: CPT

## 2020-09-27 PROCEDURE — 90686 IIV4 VACC NO PRSV 0.5 ML IM: CPT | Performed by: INTERNAL MEDICINE

## 2020-09-27 PROCEDURE — 700111 HCHG RX REV CODE 636 W/ 250 OVERRIDE (IP): Performed by: INTERNAL MEDICINE

## 2020-09-27 PROCEDURE — 99218 PR INITIAL OBSERVATION CARE,LEVL I: CPT | Performed by: INTERNAL MEDICINE

## 2020-09-27 PROCEDURE — 96376 TX/PRO/DX INJ SAME DRUG ADON: CPT

## 2020-09-27 RX ORDER — HYDROMORPHONE HYDROCHLORIDE 1 MG/ML
1 INJECTION, SOLUTION INTRAMUSCULAR; INTRAVENOUS; SUBCUTANEOUS ONCE
Status: COMPLETED | OUTPATIENT
Start: 2020-09-27 | End: 2020-09-27

## 2020-09-27 RX ORDER — ONDANSETRON 4 MG/1
4 TABLET, ORALLY DISINTEGRATING ORAL EVERY 4 HOURS PRN
Status: DISCONTINUED | OUTPATIENT
Start: 2020-09-27 | End: 2020-09-29 | Stop reason: HOSPADM

## 2020-09-27 RX ORDER — POLYETHYLENE GLYCOL 3350 17 G/17G
1 POWDER, FOR SOLUTION ORAL
Status: DISCONTINUED | OUTPATIENT
Start: 2020-09-27 | End: 2020-09-29 | Stop reason: HOSPADM

## 2020-09-27 RX ORDER — DEXAMETHASONE SODIUM PHOSPHATE 4 MG/ML
4 INJECTION, SOLUTION INTRA-ARTICULAR; INTRALESIONAL; INTRAMUSCULAR; INTRAVENOUS; SOFT TISSUE EVERY 6 HOURS
Status: DISCONTINUED | OUTPATIENT
Start: 2020-09-27 | End: 2020-09-29 | Stop reason: HOSPADM

## 2020-09-27 RX ORDER — OXYCODONE HYDROCHLORIDE 5 MG/1
5 TABLET ORAL
Status: DISCONTINUED | OUTPATIENT
Start: 2020-09-27 | End: 2020-09-29 | Stop reason: HOSPADM

## 2020-09-27 RX ORDER — DEXAMETHASONE SODIUM PHOSPHATE 4 MG/ML
12 INJECTION, SOLUTION INTRA-ARTICULAR; INTRALESIONAL; INTRAMUSCULAR; INTRAVENOUS; SOFT TISSUE ONCE
Status: COMPLETED | OUTPATIENT
Start: 2020-09-27 | End: 2020-09-27

## 2020-09-27 RX ORDER — METHOCARBAMOL 500 MG/1
500 TABLET, FILM COATED ORAL 4 TIMES DAILY
Status: DISCONTINUED | OUTPATIENT
Start: 2020-09-27 | End: 2020-09-29 | Stop reason: HOSPADM

## 2020-09-27 RX ORDER — DICLOFENAC SODIUM 75 MG/1
75 TABLET, DELAYED RELEASE ORAL 2 TIMES DAILY
Status: DISCONTINUED | OUTPATIENT
Start: 2020-09-27 | End: 2020-09-29 | Stop reason: HOSPADM

## 2020-09-27 RX ORDER — BISACODYL 10 MG
10 SUPPOSITORY, RECTAL RECTAL
Status: DISCONTINUED | OUTPATIENT
Start: 2020-09-27 | End: 2020-09-29 | Stop reason: HOSPADM

## 2020-09-27 RX ORDER — LIDOCAINE 50 MG/G
1 PATCH TOPICAL EVERY 24 HOURS
Status: DISCONTINUED | OUTPATIENT
Start: 2020-09-27 | End: 2020-09-29 | Stop reason: HOSPADM

## 2020-09-27 RX ORDER — MORPHINE SULFATE 4 MG/ML
4 INJECTION, SOLUTION INTRAMUSCULAR; INTRAVENOUS
Status: DISCONTINUED | OUTPATIENT
Start: 2020-09-27 | End: 2020-09-29 | Stop reason: HOSPADM

## 2020-09-27 RX ORDER — AMOXICILLIN 250 MG
2 CAPSULE ORAL 2 TIMES DAILY
Status: DISCONTINUED | OUTPATIENT
Start: 2020-09-28 | End: 2020-09-29 | Stop reason: HOSPADM

## 2020-09-27 RX ORDER — METHYLPREDNISOLONE 4 MG/1
4-24 TABLET ORAL DAILY
COMMUNITY
Start: 2020-09-26 | End: 2020-09-27

## 2020-09-27 RX ORDER — PROMETHAZINE HYDROCHLORIDE 25 MG/1
12.5-25 SUPPOSITORY RECTAL EVERY 4 HOURS PRN
Status: DISCONTINUED | OUTPATIENT
Start: 2020-09-27 | End: 2020-09-29 | Stop reason: HOSPADM

## 2020-09-27 RX ORDER — DIAZEPAM 5 MG/1
5 TABLET ORAL ONCE
Status: COMPLETED | OUTPATIENT
Start: 2020-09-27 | End: 2020-09-27

## 2020-09-27 RX ORDER — OXYCODONE HYDROCHLORIDE 10 MG/1
10 TABLET ORAL
Status: DISCONTINUED | OUTPATIENT
Start: 2020-09-27 | End: 2020-09-29 | Stop reason: HOSPADM

## 2020-09-27 RX ORDER — PROCHLORPERAZINE EDISYLATE 5 MG/ML
5-10 INJECTION INTRAMUSCULAR; INTRAVENOUS EVERY 4 HOURS PRN
Status: DISCONTINUED | OUTPATIENT
Start: 2020-09-27 | End: 2020-09-29 | Stop reason: HOSPADM

## 2020-09-27 RX ORDER — KETOROLAC TROMETHAMINE 30 MG/ML
30 INJECTION, SOLUTION INTRAMUSCULAR; INTRAVENOUS ONCE
Status: COMPLETED | OUTPATIENT
Start: 2020-09-27 | End: 2020-09-27

## 2020-09-27 RX ORDER — ONDANSETRON 2 MG/ML
4 INJECTION INTRAMUSCULAR; INTRAVENOUS EVERY 4 HOURS PRN
Status: DISCONTINUED | OUTPATIENT
Start: 2020-09-27 | End: 2020-09-29 | Stop reason: HOSPADM

## 2020-09-27 RX ORDER — PROMETHAZINE HYDROCHLORIDE 25 MG/1
12.5-25 TABLET ORAL EVERY 4 HOURS PRN
Status: DISCONTINUED | OUTPATIENT
Start: 2020-09-27 | End: 2020-09-29 | Stop reason: HOSPADM

## 2020-09-27 RX ORDER — DEXAMETHASONE SODIUM PHOSPHATE 4 MG/ML
4 INJECTION, SOLUTION INTRA-ARTICULAR; INTRALESIONAL; INTRAMUSCULAR; INTRAVENOUS; SOFT TISSUE EVERY 6 HOURS
Status: DISCONTINUED | OUTPATIENT
Start: 2020-09-27 | End: 2020-09-27

## 2020-09-27 RX ADMIN — DICLOFENAC SODIUM 75 MG: 75 TABLET, DELAYED RELEASE ORAL at 17:12

## 2020-09-27 RX ADMIN — LIDOCAINE 1 PATCH: 50 PATCH TOPICAL at 11:27

## 2020-09-27 RX ADMIN — INFLUENZA A VIRUS A/GUANGDONG-MAONAN/SWL1536/2019 CNIC-1909 (H1N1) ANTIGEN (FORMALDEHYDE INACTIVATED), INFLUENZA A VIRUS A/HONG KONG/2671/2019 (H3N2) ANTIGEN (FORMALDEHYDE INACTIVATED), INFLUENZA B VIRUS B/PHUKET/3073/2013 ANTIGEN (FORMALDEHYDE INACTIVATED), AND INFLUENZA B VIRUS B/WASHINGTON/02/2019 ANTIGEN (FORMALDEHYDE INACTIVATED) 0.5 ML: 15; 15; 15; 15 INJECTION, SUSPENSION INTRAMUSCULAR at 14:44

## 2020-09-27 RX ADMIN — LIDOCAINE 1 PATCH: 50 PATCH TOPICAL at 17:36

## 2020-09-27 RX ADMIN — DICLOFENAC SODIUM 75 MG: 75 TABLET, DELAYED RELEASE ORAL at 11:56

## 2020-09-27 RX ADMIN — OXYCODONE HYDROCHLORIDE 10 MG: 10 TABLET ORAL at 14:40

## 2020-09-27 RX ADMIN — DEXAMETHASONE SODIUM PHOSPHATE 4 MG: 4 INJECTION, SOLUTION INTRA-ARTICULAR; INTRALESIONAL; INTRAMUSCULAR; INTRAVENOUS; SOFT TISSUE at 20:09

## 2020-09-27 RX ADMIN — DEXAMETHASONE SODIUM PHOSPHATE 12 MG: 4 INJECTION, SOLUTION INTRA-ARTICULAR; INTRALESIONAL; INTRAMUSCULAR; INTRAVENOUS; SOFT TISSUE at 09:16

## 2020-09-27 RX ADMIN — METHOCARBAMOL TABLETS 500 MG: 500 TABLET, COATED ORAL at 20:08

## 2020-09-27 RX ADMIN — HYDROMORPHONE HYDROCHLORIDE 1 MG: 1 INJECTION, SOLUTION INTRAMUSCULAR; INTRAVENOUS; SUBCUTANEOUS at 04:33

## 2020-09-27 RX ADMIN — DEXAMETHASONE SODIUM PHOSPHATE 4 MG: 4 INJECTION, SOLUTION INTRA-ARTICULAR; INTRALESIONAL; INTRAMUSCULAR; INTRAVENOUS; SOFT TISSUE at 14:40

## 2020-09-27 RX ADMIN — KETOROLAC TROMETHAMINE 30 MG: 30 INJECTION, SOLUTION INTRAMUSCULAR at 04:32

## 2020-09-27 RX ADMIN — METHOCARBAMOL TABLETS 500 MG: 500 TABLET, COATED ORAL at 14:40

## 2020-09-27 RX ADMIN — ENOXAPARIN SODIUM 40 MG: 40 INJECTION SUBCUTANEOUS at 11:36

## 2020-09-27 RX ADMIN — OXYCODONE HYDROCHLORIDE 10 MG: 10 TABLET ORAL at 18:36

## 2020-09-27 RX ADMIN — DIAZEPAM 5 MG: 5 TABLET ORAL at 09:38

## 2020-09-27 RX ADMIN — MORPHINE SULFATE 4 MG: 4 INJECTION INTRAVENOUS at 20:09

## 2020-09-27 ASSESSMENT — ENCOUNTER SYMPTOMS
BACK PAIN: 1
ORTHOPNEA: 0
PALPITATIONS: 0
TREMORS: 0
FOCAL WEAKNESS: 0
SPUTUM PRODUCTION: 0
SHORTNESS OF BREATH: 0
COUGH: 0
PHOTOPHOBIA: 0
HALLUCINATIONS: 0
DOUBLE VISION: 0
ABDOMINAL PAIN: 0
CHILLS: 0
BLURRED VISION: 0
TINGLING: 0
HEADACHES: 0
BRUISES/BLEEDS EASILY: 0
FLANK PAIN: 0
POLYDIPSIA: 0
HEARTBURN: 0
NECK PAIN: 0
FALLS: 0
NERVOUS/ANXIOUS: 0
NAUSEA: 0
FEVER: 0
HEMOPTYSIS: 0
SPEECH CHANGE: 0
WEIGHT LOSS: 0
VOMITING: 0

## 2020-09-27 ASSESSMENT — LIFESTYLE VARIABLES
CONSUMPTION TOTAL: NEGATIVE
HOW MANY TIMES IN THE PAST YEAR HAVE YOU HAD 5 OR MORE DRINKS IN A DAY: 0
TOTAL SCORE: 0
DOES PATIENT WANT TO STOP DRINKING: NO
HAVE PEOPLE ANNOYED YOU BY CRITICIZING YOUR DRINKING: NO
ALCOHOL_USE: YES
TOTAL SCORE: 0
HAVE YOU EVER FELT YOU SHOULD CUT DOWN ON YOUR DRINKING: NO
TOTAL SCORE: 0
AVERAGE NUMBER OF DAYS PER WEEK YOU HAVE A DRINK CONTAINING ALCOHOL: 3
ON A TYPICAL DAY WHEN YOU DRINK ALCOHOL HOW MANY DRINKS DO YOU HAVE: 2
EVER HAD A DRINK FIRST THING IN THE MORNING TO STEADY YOUR NERVES TO GET RID OF A HANGOVER: NO
EVER FELT BAD OR GUILTY ABOUT YOUR DRINKING: NO
SUBSTANCE_ABUSE: 0

## 2020-09-27 ASSESSMENT — PATIENT HEALTH QUESTIONNAIRE - PHQ9
2. FEELING DOWN, DEPRESSED, IRRITABLE, OR HOPELESS: NOT AT ALL
SUM OF ALL RESPONSES TO PHQ9 QUESTIONS 1 AND 2: 0
1. LITTLE INTEREST OR PLEASURE IN DOING THINGS: NOT AT ALL

## 2020-09-27 ASSESSMENT — FIBROSIS 4 INDEX
FIB4 SCORE: 1.03
FIB4 SCORE: 1.03

## 2020-09-27 ASSESSMENT — PAIN DESCRIPTION - PAIN TYPE
TYPE: ACUTE PAIN
TYPE: ACUTE PAIN

## 2020-09-27 NOTE — ASSESSMENT & PLAN NOTE
MR-LUMBAR SPINE-W/O   Final Result      1.  L3-4 mild-moderate spinal stenosis      2.  L5-S1 left paracentral disc extrusion moderately narrowing the left lateral recess and foramen      3.  L4-5 postoperative changes appear within normal limits       Neurosurgery/Dr. Maxwell consulted; no plan for surgery in the hospital  Plan for pain control and discharge tomorrow AM  with outpatient neurosurgery follow-up (Dr. Maxwell agreed to see the patient on Monday in the clinic)  Ordered Decadron, diclofenac, opiates, methocarbamol  PT OT evaluation  Bladder scan  Fall precautions  9/28 pain improved.  Neurosurgery consulted.  Per neurosurgery recommendation, will continue pain management in the hospital

## 2020-09-27 NOTE — CARE PLAN
Problem: Safety  Goal: Will remain free from injury  Outcome: PROGRESSING AS EXPECTED  Goal: Will remain free from falls  Outcome: PROGRESSING AS EXPECTED     Problem: Pain Management  Goal: Pain level will decrease to patient's comfort goal  Outcome: PROGRESSING AS EXPECTED     Problem: Infection  Goal: Will remain free from infection  Outcome: PROGRESSING AS EXPECTED

## 2020-09-27 NOTE — PROGRESS NOTES
Pt arrived to unit via gurney. Pt oriented to room, unit, and plan of care. Pt reports pain tolerable at 3/10 at this time. Respirations even and unlabored on RA. A&O x 4. All questions answered at this time. Call light within reach, fall precautions in place, will continue to monitor.

## 2020-09-27 NOTE — ED PROVIDER NOTES
"ED Provider Note    ED Provider Note    Primary care provider: Salima Benz P.A.-C.  Means of arrival: POV  History obtained from: patient, wife  History limited by: None    CHIEF COMPLAINT  Chief Complaint   Patient presents with   • Back Pain     History of surgery at site, pain for 2 months increasing in severity, seen at [facility] 9/25/20 for same complaint       HPI  Zachery Esquivel is a 54 y.o. male who presents to the Emergency Department accompanied by his wife, with a chief complaint of back pain.  Patient has a history of low back pain which he \"manages\".  He is active, hikes, swims, goes to physical therapy and is able to manage his back pain.  A few weeks ago, his symptoms worsened prompting him to go to the [facility] here in [facility].  He was given a shot of Toradol with some improvement he notes for about 24 hours.  He was again, able to tolerate the symptoms until yesterday morning when he woke up and had severe pain.  He tried to troubleshoot this at home, was unable to tolerate the pain prompting an ED visit to [facility] yesterday about noon.  He states he was given multiple medications including Toradol, Dilaudid, a muscle relaxer which gave him some improvement.  He noted when he went home, the pain medication would not last the requisite, 4 hours before worsening.  Pain increased, he could not sleep, he could not walk secondary to pain, prompting, return to the emergency department.  He denies any numbness or tingling.  It is difficult for him to separate out weakness from pain but he believes he is limited more by pain.  He is not having any bowel or bladder retention or incontinence.  No saddle paresthesias.  No fever cough or cold symptoms.  No nausea or vomiting.  No chest pain or shortness of breath.  No recent trauma or falls.  He is otherwise quite healthy albeit overweight.  He reports seasonal allergies for which he takes Flonase.  He is not diabetic.  Denies drug use. " " His last back surgery was about 7 years ago, he was told at that time, that the disc either above or below, would require surgery \"in the next few years\".    REVIEW OF SYSTEMS  Review of Systems   Constitutional: Negative for fever.   HENT: Negative for congestion.    Respiratory: Negative for cough and shortness of breath.    Cardiovascular: Negative for chest pain.   Gastrointestinal: Negative for abdominal pain, nausea and vomiting.   Genitourinary: Negative for dysuria.   Musculoskeletal: Positive for back pain. Negative for falls.   Neurological: Negative for tingling and headaches.   Psychiatric/Behavioral: Negative for substance abuse.   All other systems reviewed and are negative.      PAST MEDICAL HISTORY   has a past medical history of Low back pain and S/P laparoscopic fundoplication.    SURGICAL HISTORY   has a past surgical history that includes abdominal exploration; nissen fundoplication laparoscopic; appendectomy; cholecystectomy; and lumbar laminectomy diskectomy.    SOCIAL HISTORY  Social History     Tobacco Use   • Smoking status: Never Smoker   • Smokeless tobacco: Never Used   Substance Use Topics   • Alcohol use: Yes     Alcohol/week: 1.0 oz     Types: 2 Glasses of wine per week     Comment: Occasionally   • Drug use: No      Social History     Substance and Sexual Activity   Drug Use No       FAMILY HISTORY  Family History   Problem Relation Age of Onset   • Diabetes Father    • Diabetes Paternal Grandmother    • Cancer Sister 35        breast   • Heart Disease Neg Hx    • Stroke Neg Hx    • Hypertension Neg Hx    • Hyperlipidemia Neg Hx        CURRENT MEDICATIONS  Home Medications     Reviewed by Nelly Hightower (Pharmacy Tech) on 09/27/20 at 1015  Med List Status: Complete   Medication Last Dose Status   oxyCODONE immediate-release (ROXICODONE) 5 MG Tab 9/26/2020 Active   tizanidine (ZANAFLEX) 4 MG Tab 9/26/2020 Active                ALLERGIES  Allergies   Allergen Reactions   • " Penicillins Rash     .   • Tylenol      Can't take due to elevated liver enzymes   • Bactrim [Sulfamethoxazole W-Trimethoprim]      Itching rash palms and soles       PHYSICAL EXAM  VITAL SIGNS: /79   Pulse 70   Temp 36.2 °C (97.2 °F) (Temporal)   Resp 18   Ht 1.829 m (6')   Wt 107.7 kg (237 lb 7 oz)   SpO2 93%   BMI 32.20 kg/m²   Vitals reviewed.  Constitutional: Patient is oriented to person, place, and time. Appears well-developed and well-nourished. Moderate distress.    Head: Normocephalic and atraumatic.   Ears: Normal external ears bilaterally.   Eyes: Conjunctivae are normal.  Neck: Normal range of motion.  No midline tenderness.  Cardiovascular: Normal rate, regular rhythm and normal heart sounds. Normal peripheral pulses, bilateral lower extremities.  Pulmonary/Chest: Effort normal and breath sounds normal. No respiratory distress, no wheezes, rhonchi, or rales.   Abdominal: Soft. Bowel sounds are normal. There is no tenderness. No rebound or guarding, or peritoneal signs. No CVA tenderness.  Musculoskeletal: No edema.  Patient has pain to the midline, low back, and diffusely, to the bilateral buttock and paraspinal muscles.  There is no overlying skin changes.  Patient is able to stand on his tiptoes, on his heels.  There is muscle tightness over his buttocks bilaterally.  Neurological: No focal deficits. Normal strength and sensation to bilateral lower extremities.  Skin: Skin is warm and dry. No erythema. No pallor.   Psychiatric: Patient has a normal mood and affect.     RADIOLOGY  MR-LUMBAR SPINE-W/O   Final Result      1.  L3-4 mild-moderate spinal stenosis      2.  L5-S1 left paracentral disc extrusion moderately narrowing the left lateral recess and foramen      3.  L4-5 postoperative changes appear within normal limits        The radiologist's interpretation of all radiological studies have been reviewed by me.    COURSE & MEDICAL DECISION MAKING  Pertinent Labs & Imaging studies  reviewed. (See chart for details)    Obtained and reviewed past medical records.  Patient was seen yesterday in the emergency department at [facility], seen for similar reasons.  Discharged home with oxycodone and muscle relaxers.    3:58 AM - Patient seen and examined at bedside.  This is a 54-year-old male who presents with intractable back pain, despite being prescribed oxycodone, muscle relaxer and anti-inflammatories at an ED visit yesterday.  I do not elicit any neurologic deficits.  He does not appear to have any infectious etiologies.  He denies drug use.  He has a history of disc bulging.  Given his intractable pain, despite, lack of red flag symptoms, I have advised further evaluation with MRI.  He will be kept n.p.o.  He will be treated with anti-inflammatories and pain medication.      0900am MRI results reviewed. N/S paged    0905am D/W Dr. Maxwell he would recommend trial discharge patient to home, unfortunately, recommendations are things that the patient is already on.  We will give additional dose of Decadron as it does not appear, that the patient was started on steroids.  Patient is discharged home, he can see him in the office tomorrow.    0940AM patient's reevaluated at the bedside.  His wife is at the bedside.  He does report feeling better although not all that much.  I discussed with him, my conversation with Dr. Maxwell, neurosurgery on-call.  Patient's essentially tells me, there is no way he can be discharged home, he assures me, he will return, due to uncontrolled pain.  It does not appear, he received steroids yesterday.  Patient and wife, are insistent, that something else has to be done.  Of advised, I will contact the hospitalist for admission for intractable pain although I cannot promise that any intervention will be undertaken on an emergent basis.  Continues to be free of any neurologic deficits.    0945am D/W Dr. Smith hospitalist, who agrees to admit the patient to their  service.  Will recontact neurosurgery to notify them, that the patient was unable to be discharged home due to intractable pain.    1028 a.m. discussed with Dr. Maxwell, neurosurgeon, who agrees to see the patient in consultation, he is made aware, the patient is admitted to the hospitalist service.      FINAL IMPRESSION  Intractable back pain, lumbar  Disc bulge

## 2020-09-27 NOTE — CONSULTS
DATE OF SERVICE:  09/27/2020    NEUROSURGICAL CONSULTATION NOTE    HISTORY OF PRESENT ILLNESS:  The patient is a very pleasant 54-year-old male   status post lumbar 4-5 instrumented fusion by Dr. Espinoza in [place] about 7   years ago.  He had been doing well up until about 2 months ago where he   started having increasing amount of back pain that radiated to his anterior   thighs.  He went to [facility] in [place] despite the fact that he lives in   [place] and was given a Medrol Dosepak and muscle relaxants and opiates and did   well initially.  Then, progressively got worse and ended up in the emergency   room at [facility] yesterday, was discharged from there without any   imaging, and then came back for worsening symptoms early this morning.  MRI   was completed and neurosurgical consultation was requested based on inability   to control the patient's pain.  On further questioning, he said he is having   no weakness or tingling in his legs, just pain.  He has some burning   dysesthetic pain in his thighs and feels like they are cramping.  No bowel or   bladder complaints.    PAST MEDICAL HISTORY:  None.    ALLERGIES:  None.    MEDICATIONS:  Flonase for seasonal allergies.    SOCIAL HISTORY:  He is a mostly retired .  Occasional ETOH.  No   smoking.  No illicits.    PHYSICAL EXAMINATION:  GENERAL:  A and O x3.  GCS of 15.  No distress.  HEENT:  Pupils equal, round, reactive to light.  Extraocular muscles intact.    Tongue midline.  Face symmetric.  NEUROLOGIC:  Motor is 5/5 strength in all muscle groups in the upper and lower   extremities.  Sensory grossly intact to light touch.    IMAGING:  MRI of the lumbar spine without contrast shows good instrumented   fusion at lumbar 4-5.  He has severe central canal stenosis at lumbar 3-4,   just above his fusion as a result of facet arthropathy, disk bulge, and   ligamentum flavum hypertrophy.  At L5-S1, he has mild-to-moderate left-sided   lateral recess  stenosis from a combination of mostly disk bulge at this point.    ASSESSMENT AND PLAN:  Patient has a normal neurologic exam, so we will try to   manage him nonoperatively.  We will try to continue steroids at this point and   muscle relaxants and opiates.  If he is not under control in the next 24-48   hours, would recommend an epidural steroid injection.  I have told him that   with a normal physical exam, we will try to get him through this   nonoperatively and that if he does need surgical intervention, we will try to   do it as an outpatient, but we will reserve final decision making to see how   he does over the next several days.       ____________________________________     TEO RICKS MD    CPD / NTS    DD:  09/27/2020 11:56:51  DT:  09/27/2020 12:51:28    D#:  [id number]  Job#:  [id number]

## 2020-09-27 NOTE — PROGRESS NOTES
TRIAGE OFFICER ADMISSION ACCEPTANCE NOTE:    - I spoke and discussed the case with the ER physician, Dr. Saab.  - This is a 54 y.o. male with chronic low back pain, who presented to the ED with progressively worsening low back pain.  He was at the [facility] ED yesterday, and he was given multiple medications including Toradol, Dilaudid, muscle relaxant, and steroids, which gave him some improvement, but pain escalated again.  Per the ERP, he did not have any neurologic deficits, and does not appear to have any infectious etiologies.  Lab work-up showed no leukocytosis, with normal electrolytes and renal function.  MRI of the lumbar spine did show L3-L4 mild to moderate spinal stenosis, with L5-S1 left paracentral disc extrusion moderately narrowing the left lateral recess and foramen.  Neurosurgery (Dr. Maxwell) was consulted, who recommended non-urgent surgical management.  Otherwise vital signs are stable.  - Patient now being admitted to the hospital for further management.   - Hospitalist, Dr. Hughes will be admitting the patient.  - will place observation admission order to CDU. Patient will not require isolation.   - Please call admitting physician for full admission orders, and cross-coverage issues on patient's arrival to the unit.

## 2020-09-27 NOTE — H&P
Hospital Medicine History & Physical Note    Date of Service  9/27/2020    Primary Care Physician  Salima Benz P.A.-C.    Consultants  Neurosurgery Dr. Maxwell    Code Status  Full Code    Chief Complaint  Chief Complaint   Patient presents with   • Back Pain     History of surgery at site, pain for 2 months increasing in severity, seen at [facility] 9/25/20 for same complaint       History of Presenting Illness  54 y.o. male with history of lumbar laminectomy discectomy by Dr. Ruth in [facility] 7 years ago, who presented 9/27/2020 with complaints of intractable lower back pain, radiating to the bilateral thighs, worsened with movement, in the last 2 weeks.  He was seen at [facility] last night, where he received IV Toradol, Dilaudid, muscle relaxant.  He was discharged on steroids, oxycodone, tizanidine with some improvement.  Lower back pain came back and became intolerable at 2 AM, so he decided to come to ER.  He denies focal weakness, but stated he is having some difficulty with urination on the peak of the pain.  MRI of the lumbar spine without contrast showed mild to moderate spinal stenosis and L3-L4, left paracentral disc extrusion moderately narrowing the left lateral recess and foramen and L5-S1.  ERP/Dr. Saab discussed patient case with Dr. Maxwell/neurosurgery, who wants patient to come to his office tomorrow.  No need for emergent surgery in the hospital according to neurosurgery.  Review of Systems  Review of Systems   Constitutional: Negative for chills, fever and weight loss.   HENT: Negative for ear pain, hearing loss and tinnitus.    Eyes: Negative for blurred vision, double vision and photophobia.   Respiratory: Negative for cough, hemoptysis and sputum production.    Cardiovascular: Negative for chest pain, palpitations and orthopnea.   Gastrointestinal: Negative for heartburn, nausea and vomiting.   Genitourinary: Negative for dysuria, flank pain, frequency and hematuria.    Musculoskeletal: Positive for back pain and joint pain. Negative for neck pain.   Skin: Negative for itching and rash.   Neurological: Negative for tremors, speech change, focal weakness and headaches.   Endo/Heme/Allergies: Negative for environmental allergies and polydipsia. Does not bruise/bleed easily.   Psychiatric/Behavioral: Negative for hallucinations and substance abuse. The patient is not nervous/anxious.        Past Medical History   has a past medical history of Low back pain and S/P laparoscopic fundoplication.    Surgical History   has a past surgical history that includes abdominal exploration; nissen fundoplication laparoscopic; appendectomy; cholecystectomy; and lumbar laminectomy diskectomy.     Family History  family history includes Cancer (age of onset: 35) in his sister; Diabetes in his father and paternal grandmother.     Social History   reports that he has never smoked. He has never used smokeless tobacco. He reports current alcohol use of about 1.0 oz of alcohol per week. He reports that he does not use drugs.    Allergies  Allergies   Allergen Reactions   • Penicillins Rash     .   • Tylenol      Can't take due to elevated liver enzymes   • Bactrim [Sulfamethoxazole W-Trimethoprim]      Itching rash palms and soles       Medications  Prior to Admission Medications   Prescriptions Last Dose Informant Patient Reported? Taking?   oxyCODONE immediate-release (ROXICODONE) 5 MG Tab UNK at [facility] Patient's Home Pharmacy No No   Sig: Take 1 Tab by mouth every four hours as needed for Severe Pain for up to 3 days.   tizanidine (ZANAFLEX) 4 MG Tab UNK at [facility] Patient's Home Pharmacy No No   Sig: Take 1 Tab by mouth every 6 hours as needed.      Facility-Administered Medications: None       Physical Exam  Temp:  [36.4 °C (97.6 °F)-36.7 °C (98.1 °F)] 36.7 °C (98.1 °F)  Pulse:  [69-86] 69  Resp:  [16-18] 18  BP: (117-149)/(72-94) 121/82  SpO2:  [93 %-97 %] 93 %    Physical Exam  Vitals signs and nursing  note reviewed.   Constitutional:       General: He is not in acute distress.     Appearance: Normal appearance.   HENT:      Head: Normocephalic and atraumatic.      Nose: Nose normal.      Mouth/Throat:      Mouth: Mucous membranes are moist.   Eyes:      Extraocular Movements: Extraocular movements intact.      Pupils: Pupils are equal, round, and reactive to light.   Neck:      Musculoskeletal: Normal range of motion and neck supple.   Cardiovascular:      Rate and Rhythm: Normal rate and regular rhythm.   Pulmonary:      Effort: Pulmonary effort is normal.      Breath sounds: Normal breath sounds.   Abdominal:      General: Abdomen is flat. There is no distension.      Tenderness: There is no abdominal tenderness.   Musculoskeletal: Normal range of motion.         General: No swelling or deformity.   Skin:     General: Skin is warm and dry.   Neurological:      General: No focal deficit present.      Mental Status: He is alert and oriented to person, place, and time.      Sensory: Sensation is intact.      Motor: Motor function is intact.      Deep Tendon Reflexes:      Reflex Scores:       Patellar reflexes are 1+ on the right side and 1+ on the left side.  Psychiatric:         Mood and Affect: Mood normal.         Behavior: Behavior normal.         Laboratory:  Recent Labs     09/26/20  1415   WBC 9.1   RBC 5.51   HEMOGLOBIN 16.3   HEMATOCRIT 48.5   MCV 88.0   MCH 29.6   MCHC 33.6*   RDW 38.1   PLATELETCT 188   MPV 9.8     Recent Labs     09/26/20  1415   SODIUM 140   POTASSIUM 4.3   CHLORIDE 103   CO2 24   GLUCOSE 102*   BUN 15   CREATININE 1.03   CALCIUM 9.5     Recent Labs     09/26/20  1415   ALTSGPT 32   ASTSGOT 20   ALKPHOSPHAT 52   TBILIRUBIN 0.9   GLUCOSE 102*         No results for input(s): NTPROBNP in the last 72 hours.      No results for input(s): TROPONINT in the last 72 hours.    Imaging:  MR-LUMBAR SPINE-W/O   Final Result      1.  L3-4 mild-moderate spinal stenosis      2.  L5-S1 left  paracentral disc extrusion moderately narrowing the left lateral recess and foramen      3.  L4-5 postoperative changes appear within normal limits            Assessment/Plan:  I anticipate this patient is appropriate for observation status at this time.    Acute low back pain with sciatica  Assessment & Plan  MR-LUMBAR SPINE-W/O   Final Result      1.  L3-4 mild-moderate spinal stenosis      2.  L5-S1 left paracentral disc extrusion moderately narrowing the left lateral recess and foramen      3.  L4-5 postoperative changes appear within normal limits       Neurosurgery/Dr. Maxwell consulted; no plan for surgery in the hospital  Plan for pain control and discharge tomorrow AM  with outpatient neurosurgery follow-up (Dr. Maxwell agreed to see the patient on Monday in the clinic)  Ordered Decadron, diclofenac, opiates, methocarbamol  PT OT evaluation  Bladder scan  Fall precautions

## 2020-09-27 NOTE — ED TRIAGE NOTES
Chief Complaint   Patient presents with   • Back Pain     History of surgery at site, pain for 2 months increasing in severity, seen at [facility] 9/25/20 for same complaint

## 2020-09-28 PROCEDURE — G0378 HOSPITAL OBSERVATION PER HR: HCPCS

## 2020-09-28 PROCEDURE — 97161 PT EVAL LOW COMPLEX 20 MIN: CPT

## 2020-09-28 PROCEDURE — 99224 PR SUBSEQUENT OBSERVATION CARE,LEVEL I: CPT | Performed by: INTERNAL MEDICINE

## 2020-09-28 PROCEDURE — 96376 TX/PRO/DX INJ SAME DRUG ADON: CPT

## 2020-09-28 PROCEDURE — 700101 HCHG RX REV CODE 250: Performed by: INTERNAL MEDICINE

## 2020-09-28 PROCEDURE — A9270 NON-COVERED ITEM OR SERVICE: HCPCS | Performed by: INTERNAL MEDICINE

## 2020-09-28 PROCEDURE — 700111 HCHG RX REV CODE 636 W/ 250 OVERRIDE (IP): Performed by: INTERNAL MEDICINE

## 2020-09-28 PROCEDURE — 700102 HCHG RX REV CODE 250 W/ 637 OVERRIDE(OP): Performed by: INTERNAL MEDICINE

## 2020-09-28 PROCEDURE — 96372 THER/PROPH/DIAG INJ SC/IM: CPT

## 2020-09-28 RX ADMIN — DEXAMETHASONE SODIUM PHOSPHATE 4 MG: 4 INJECTION, SOLUTION INTRA-ARTICULAR; INTRALESIONAL; INTRAMUSCULAR; INTRAVENOUS; SOFT TISSUE at 05:18

## 2020-09-28 RX ADMIN — METHOCARBAMOL TABLETS 500 MG: 500 TABLET, COATED ORAL at 22:35

## 2020-09-28 RX ADMIN — DEXAMETHASONE SODIUM PHOSPHATE 4 MG: 4 INJECTION, SOLUTION INTRA-ARTICULAR; INTRALESIONAL; INTRAMUSCULAR; INTRAVENOUS; SOFT TISSUE at 15:14

## 2020-09-28 RX ADMIN — METHOCARBAMOL TABLETS 500 MG: 500 TABLET, COATED ORAL at 09:19

## 2020-09-28 RX ADMIN — METHOCARBAMOL TABLETS 500 MG: 500 TABLET, COATED ORAL at 12:58

## 2020-09-28 RX ADMIN — DOCUSATE SODIUM 50 MG AND SENNOSIDES 8.6 MG 2 TABLET: 8.6; 5 TABLET, FILM COATED ORAL at 05:18

## 2020-09-28 RX ADMIN — LIDOCAINE 1 PATCH: 50 PATCH TOPICAL at 10:35

## 2020-09-28 RX ADMIN — DEXAMETHASONE SODIUM PHOSPHATE 4 MG: 4 INJECTION, SOLUTION INTRA-ARTICULAR; INTRALESIONAL; INTRAMUSCULAR; INTRAVENOUS; SOFT TISSUE at 09:18

## 2020-09-28 RX ADMIN — DOCUSATE SODIUM 50 MG AND SENNOSIDES 8.6 MG 2 TABLET: 8.6; 5 TABLET, FILM COATED ORAL at 17:20

## 2020-09-28 RX ADMIN — DICLOFENAC SODIUM 75 MG: 75 TABLET, DELAYED RELEASE ORAL at 17:20

## 2020-09-28 RX ADMIN — DEXAMETHASONE SODIUM PHOSPHATE 4 MG: 4 INJECTION, SOLUTION INTRA-ARTICULAR; INTRALESIONAL; INTRAMUSCULAR; INTRAVENOUS; SOFT TISSUE at 22:35

## 2020-09-28 RX ADMIN — DICLOFENAC SODIUM 75 MG: 75 TABLET, DELAYED RELEASE ORAL at 05:18

## 2020-09-28 RX ADMIN — ENOXAPARIN SODIUM 40 MG: 40 INJECTION SUBCUTANEOUS at 05:18

## 2020-09-28 RX ADMIN — METHOCARBAMOL TABLETS 500 MG: 500 TABLET, COATED ORAL at 16:37

## 2020-09-28 RX ADMIN — OXYCODONE 5 MG: 5 TABLET ORAL at 22:59

## 2020-09-28 ASSESSMENT — ENCOUNTER SYMPTOMS
PALPITATIONS: 0
COUGH: 0
PHOTOPHOBIA: 0
VOMITING: 0
NECK PAIN: 0
HALLUCINATIONS: 0
HEADACHES: 0
SPEECH CHANGE: 0
FOCAL WEAKNESS: 0
POLYDIPSIA: 0
DOUBLE VISION: 0
NAUSEA: 0
FEVER: 0
HEARTBURN: 0
WEIGHT LOSS: 0
NERVOUS/ANXIOUS: 0
HEMOPTYSIS: 0
TREMORS: 0
FLANK PAIN: 0
CHILLS: 0
BLURRED VISION: 0
BACK PAIN: 1
SPUTUM PRODUCTION: 0
ORTHOPNEA: 0
BRUISES/BLEEDS EASILY: 0

## 2020-09-28 ASSESSMENT — COGNITIVE AND FUNCTIONAL STATUS - GENERAL
MOBILITY SCORE: 24
SUGGESTED CMS G CODE MODIFIER MOBILITY: CH

## 2020-09-28 ASSESSMENT — LIFESTYLE VARIABLES: SUBSTANCE_ABUSE: 0

## 2020-09-28 ASSESSMENT — PAIN DESCRIPTION - PAIN TYPE
TYPE: ACUTE PAIN
TYPE: ACUTE PAIN

## 2020-09-28 ASSESSMENT — GAIT ASSESSMENTS
DISTANCE (FEET): 250
GAIT LEVEL OF ASSIST: SUPERVISED

## 2020-09-28 NOTE — PROGRESS NOTES
Pt awake, alert, oriented, pt states that pain is tolerable at this time, much better than yesterday. Physical Therapy went in and worked with patient. No acute distress noted, pt respirations are unlabored.

## 2020-09-28 NOTE — PROGRESS NOTES
Neurosurgery Progress Note    Subjective:  Pt awake, alert  Pain better controlled today  Ambulated to shower this morning w/o issues      Exam:    A&O x3, GCS 15  PERRL, EOMI  Face symm, tongue midline  LE str 5/5, sens equal to light touch       BP  Min: 119/83  Max: 151/84  Pulse  Av.4  Min: 69  Max: 105  Resp  Av.6  Min: 16  Max: 20  Temp  Av.8 °C (98.2 °F)  Min: 36.2 °C (97.2 °F)  Max: 37.1 °C (98.7 °F)  SpO2  Av %  Min: 93 %  Max: 95 %    No data recorded    Recent Labs     20  1415   WBC 9.1   RBC 5.51   HEMOGLOBIN 16.3   HEMATOCRIT 48.5   MCV 88.0   MCH 29.6   MCHC 33.6*   RDW 38.1   PLATELETCT 188   MPV 9.8     Recent Labs     20  1415   SODIUM 140   POTASSIUM 4.3   CHLORIDE 103   CO2 24   GLUCOSE 102*   BUN 15   CREATININE 1.03   CALCIUM 9.5               Intake/Output     None          No intake or output data in the 24 hours ending 20 0912         • senna-docusate  2 Tab BID    And   • polyethylene glycol/lytes  1 Packet QDAY PRN    And   • magnesium hydroxide  30 mL QDAY PRN    And   • bisacodyl  10 mg QDAY PRN   • enoxaparin  40 mg DAILY   • ondansetron  4 mg Q4HRS PRN   • ondansetron  4 mg Q4HRS PRN   • promethazine  12.5-25 mg Q4HRS PRN   • promethazine  12.5-25 mg Q4HRS PRN   • prochlorperazine  5-10 mg Q4HRS PRN   • diclofenac DR  75 mg BID   • lidocaine  1 Patch Q24HR   • methocarbamol  500 mg 4X/DAY   • Pharmacy Consult Request  1 Each PHARMACY TO DOSE   • oxyCODONE immediate-release  5 mg Q3HRS PRN   • oxyCODONE immediate release  10 mg Q3HRS PRN   • morphine injection  4 mg Q3HRS PRN   • dexamethasone  4 mg Q6HRS       Assessment and Plan:  Hospital day # 2 intractable pain, s/p L4-5 fusion 7 yrs ago, 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

## 2020-09-28 NOTE — THERAPY
Physical Therapy   Initial Evaluation     Patient Name: Zachery Esquivel  Age:  54 y.o., Sex:  male  Medical Record #: [id number]  Today's Date: 9/28/2020          Assessment  Patient is 54 y.o. male that presented to acute with complaint of LBP with radiating symptoms to anterior thighs. PMHx significant for prior L spine surgery. He presented to PT with pain, but no reports of radiating symptoms, and was able to perform functional mobility without AD with supervision. Provided education regarding pain management strategies and therapeutic exercises; patient demonstrated understanding. Patient reported no concern regarding returning home following medical DC and reported he was seeing outpatient PT prior to acute pain. Patient will not be actively followed for physical therapy services at this time, however may be seen if requested by physician for 1 more visit within 30 days to address any discharge or equipment needs.     Plan    Recommend Physical Therapy for Evaluation only    DC Equipment Recommendations: None  Discharge Recommendations: Recommend outpatient physical therapy services to address higher level deficits       Subjective    agreeable     Objective       09/28/20 0942   Prior Living Situation   Prior Services None   Housing / Facility 1 Story House   Steps Into Home 0   Steps In Home 0   Equipment Owned Front-Wheel Walker   Lives with - Patient's Self Care Capacity Spouse   Comments Patient reported spouse able to assist   Prior Level of Functional Mobility   Bed Mobility Independent   Transfer Status Independent   Ambulation Independent   Distance Ambulation (Feet)   (community)   Assistive Devices Used None   Stairs Independent   Comments Active PTA; attempted swimming for exercise which may have exacerbated back pain   History of Falls   History of Falls No   Cognition    Cognition / Consciousness WDL   Level of Consciousness Alert   Comments pleasant, cooperative, receptive to education    Balance Assessment   Sitting Balance (Static) Fair +   Sitting Balance (Dynamic) Fair +   Standing Balance (Static) Fair +   Standing Balance (Dynamic) Fair +   Comments no AD, no LOB. Introduced FWW as pain management tool for education but patient not reliant   Gait Analysis   Gait Level Of Assist Supervised   Assistive Device None   Distance (Feet) 250   # of Times Distance was Traveled 1   Deviation   (decreased margot)   # of Stairs Climbed 0   Bed Mobility    Supine to Sit Supervised  (instructed log roll)   Sit to Supine Supervised   Scooting Supervised  (seated)   Rolling Supervised   Functional Mobility   Sit to Stand Supervised   Bed, Chair, Wheelchair Transfer Supervised   Toilet Transfers Supervised   Transfer Method Stand Step   Anticipated Discharge Equipment and Recommendations   DC Equipment Recommendations None   Discharge Recommendations Recommend outpatient physical therapy services to address higher level deficits

## 2020-09-28 NOTE — PROGRESS NOTES
Hospital Medicine Daily Progress Note    Date of Service  9/28/2020    Chief Complaint/  Hospital Course  54 y.o. male with history of lower back surgery, admitted 9/27/2020 with intractable lower back pain.  Neurosurgery consulted.  Recommended medical management.      Interval Problem Update  Patient stated lower back pain improved, he took shower and ambulated.  Per neurosurgery note, to continue pain management in the hospital.  Consultants/Specialty  Neurosurgery    Code Status  Full Code    Disposition  Home when medically cleared    Review of Systems  Review of Systems   Constitutional: Negative for chills, fever and weight loss.   HENT: Negative for ear pain, hearing loss and tinnitus.    Eyes: Negative for blurred vision, double vision and photophobia.   Respiratory: Negative for cough, hemoptysis and sputum production.    Cardiovascular: Negative for chest pain, palpitations and orthopnea.   Gastrointestinal: Negative for heartburn, nausea and vomiting.   Genitourinary: Negative for dysuria, flank pain, frequency and hematuria.   Musculoskeletal: Positive for back pain. Negative for joint pain and neck pain.   Skin: Negative for itching and rash.   Neurological: Negative for tremors, speech change, focal weakness and headaches.   Endo/Heme/Allergies: Negative for environmental allergies and polydipsia. Does not bruise/bleed easily.   Psychiatric/Behavioral: Negative for hallucinations and substance abuse. The patient is not nervous/anxious.         Physical Exam  Temp:  [36.2 °C (97.2 °F)-37.1 °C (98.7 °F)] 36.8 °C (98.2 °F)  Pulse:  [] 71  Resp:  [16-20] 16  BP: (119-151)/(75-85) 119/83  SpO2:  [93 %-94 %] 94 %    Physical Exam  Vitals signs and nursing note reviewed.   Constitutional:       General: He is not in acute distress.     Appearance: Normal appearance.   HENT:      Head: Normocephalic and atraumatic.      Nose: Nose normal.      Mouth/Throat:      Mouth: Mucous membranes are moist.    Eyes:      Extraocular Movements: Extraocular movements intact.      Pupils: Pupils are equal, round, and reactive to light.   Neck:      Musculoskeletal: Normal range of motion and neck supple.   Cardiovascular:      Rate and Rhythm: Normal rate and regular rhythm.   Pulmonary:      Effort: Pulmonary effort is normal.      Breath sounds: Normal breath sounds.   Abdominal:      General: Abdomen is flat. There is no distension.      Tenderness: There is no abdominal tenderness.   Musculoskeletal: Normal range of motion.         General: No swelling or deformity.      Lumbar back: He exhibits tenderness.   Skin:     General: Skin is warm and dry.   Neurological:      General: No focal deficit present.      Mental Status: He is alert and oriented to person, place, and time.   Psychiatric:         Mood and Affect: Mood normal.         Behavior: Behavior normal.         Fluids  No intake or output data in the 24 hours ending 09/28/20 1022    Laboratory  Recent Labs     09/26/20  1415   WBC 9.1   RBC 5.51   HEMOGLOBIN 16.3   HEMATOCRIT 48.5   MCV 88.0   MCH 29.6   MCHC 33.6*   RDW 38.1   PLATELETCT 188   MPV 9.8     Recent Labs     09/26/20  1415   SODIUM 140   POTASSIUM 4.3   CHLORIDE 103   CO2 24   GLUCOSE 102*   BUN 15   CREATININE 1.03   CALCIUM 9.5                   Imaging  MR-LUMBAR SPINE-W/O   Final Result      1.  L3-4 mild-moderate spinal stenosis      2.  L5-S1 left paracentral disc extrusion moderately narrowing the left lateral recess and foramen      3.  L4-5 postoperative changes appear within normal limits           Assessment/Plan  Acute low back pain with sciatica  Assessment & Plan  MR-LUMBAR SPINE-W/O   Final Result      1.  L3-4 mild-moderate spinal stenosis      2.  L5-S1 left paracentral disc extrusion moderately narrowing the left lateral recess and foramen      3.  L4-5 postoperative changes appear within normal limits       Neurosurgery/Dr. Maxwell consulted; no plan for surgery in the  hospital  Plan for pain control and discharge tomorrow AM  with outpatient neurosurgery follow-up (Dr. Maxwell agreed to see the patient on Monday in the clinic)  Ordered Decadron, diclofenac, opiates, methocarbamol  PT OT evaluation  Bladder scan  Fall precautions  9/28 pain improved.  Neurosurgery consulted.  Per neurosurgery recommendation, will continue pain management in the hospital       VTE prophylaxis: Heparin

## 2020-09-29 ENCOUNTER — APPOINTMENT (OUTPATIENT)
Dept: RADIOLOGY | Facility: MEDICAL CENTER | Age: 55
End: 2020-09-29
Attending: INTERNAL MEDICINE
Payer: COMMERCIAL

## 2020-09-29 ENCOUNTER — HOSPITAL ENCOUNTER (EMERGENCY)
Facility: MEDICAL CENTER | Age: 55
End: 2020-09-30
Attending: EMERGENCY MEDICINE
Payer: COMMERCIAL

## 2020-09-29 VITALS
TEMPERATURE: 97.3 F | RESPIRATION RATE: 16 BRPM | HEIGHT: 72 IN | HEART RATE: 66 BPM | SYSTOLIC BLOOD PRESSURE: 129 MMHG | DIASTOLIC BLOOD PRESSURE: 87 MMHG | OXYGEN SATURATION: 94 % | BODY MASS INDEX: 32.16 KG/M2 | WEIGHT: 237.44 LBS

## 2020-09-29 DIAGNOSIS — M54.50 ACUTE BILATERAL LOW BACK PAIN WITHOUT SCIATICA: ICD-10-CM

## 2020-09-29 PROBLEM — R79.89 ELEVATED LFTS: Status: ACTIVE | Noted: 2020-09-29

## 2020-09-29 LAB
ALBUMIN SERPL BCP-MCNC: 3.9 G/DL (ref 3.2–4.9)
ALBUMIN/GLOB SERPL: 1.8 G/DL
ALP SERPL-CCNC: 65 U/L (ref 30–99)
ALT SERPL-CCNC: 267 U/L (ref 2–50)
ANION GAP SERPL CALC-SCNC: 14 MMOL/L (ref 7–16)
AST SERPL-CCNC: 44 U/L (ref 12–45)
BASOPHILS # BLD AUTO: 0.1 % (ref 0–1.8)
BASOPHILS # BLD: 0.01 K/UL (ref 0–0.12)
BILIRUB SERPL-MCNC: 0.6 MG/DL (ref 0.1–1.5)
BUN SERPL-MCNC: 20 MG/DL (ref 8–22)
CALCIUM SERPL-MCNC: 8.9 MG/DL (ref 8.5–10.5)
CHLORIDE SERPL-SCNC: 106 MMOL/L (ref 96–112)
CO2 SERPL-SCNC: 19 MMOL/L (ref 20–33)
CREAT SERPL-MCNC: 0.74 MG/DL (ref 0.5–1.4)
EOSINOPHIL # BLD AUTO: 0 K/UL (ref 0–0.51)
EOSINOPHIL NFR BLD: 0 % (ref 0–6.9)
ERYTHROCYTE [DISTWIDTH] IN BLOOD BY AUTOMATED COUNT: 41.8 FL (ref 35.9–50)
GLOBULIN SER CALC-MCNC: 2.2 G/DL (ref 1.9–3.5)
GLUCOSE SERPL-MCNC: 154 MG/DL (ref 65–99)
HCT VFR BLD AUTO: 44.6 % (ref 42–52)
HGB BLD-MCNC: 14.6 G/DL (ref 14–18)
IMM GRANULOCYTES # BLD AUTO: 0.08 K/UL (ref 0–0.11)
IMM GRANULOCYTES NFR BLD AUTO: 0.6 % (ref 0–0.9)
LYMPHOCYTES # BLD AUTO: 0.82 K/UL (ref 1–4.8)
LYMPHOCYTES NFR BLD: 6.1 % (ref 22–41)
MCH RBC QN AUTO: 29.7 PG (ref 27–33)
MCHC RBC AUTO-ENTMCNC: 32.7 G/DL (ref 33.7–35.3)
MCV RBC AUTO: 90.8 FL (ref 81.4–97.8)
MONOCYTES # BLD AUTO: 0.95 K/UL (ref 0–0.85)
MONOCYTES NFR BLD AUTO: 7 % (ref 0–13.4)
NEUTROPHILS # BLD AUTO: 11.64 K/UL (ref 1.82–7.42)
NEUTROPHILS NFR BLD: 86.2 % (ref 44–72)
NRBC # BLD AUTO: 0 K/UL
NRBC BLD-RTO: 0 /100 WBC
PLATELET # BLD AUTO: 184 K/UL (ref 164–446)
PMV BLD AUTO: 10.5 FL (ref 9–12.9)
POTASSIUM SERPL-SCNC: 4.3 MMOL/L (ref 3.6–5.5)
PROT SERPL-MCNC: 6.1 G/DL (ref 6–8.2)
RBC # BLD AUTO: 4.91 M/UL (ref 4.7–6.1)
SODIUM SERPL-SCNC: 139 MMOL/L (ref 135–145)
WBC # BLD AUTO: 13.5 K/UL (ref 4.8–10.8)

## 2020-09-29 PROCEDURE — 96376 TX/PRO/DX INJ SAME DRUG ADON: CPT

## 2020-09-29 PROCEDURE — 96374 THER/PROPH/DIAG INJ IV PUSH: CPT

## 2020-09-29 PROCEDURE — 96372 THER/PROPH/DIAG INJ SC/IM: CPT

## 2020-09-29 PROCEDURE — A9270 NON-COVERED ITEM OR SERVICE: HCPCS | Performed by: EMERGENCY MEDICINE

## 2020-09-29 PROCEDURE — G0378 HOSPITAL OBSERVATION PER HR: HCPCS

## 2020-09-29 PROCEDURE — 99284 EMERGENCY DEPT VISIT MOD MDM: CPT

## 2020-09-29 PROCEDURE — 700111 HCHG RX REV CODE 636 W/ 250 OVERRIDE (IP): Performed by: INTERNAL MEDICINE

## 2020-09-29 PROCEDURE — 80053 COMPREHEN METABOLIC PANEL: CPT

## 2020-09-29 PROCEDURE — 85025 COMPLETE CBC W/AUTO DIFF WBC: CPT

## 2020-09-29 PROCEDURE — 99217 PR OBSERVATION CARE DISCHARGE: CPT | Performed by: INTERNAL MEDICINE

## 2020-09-29 PROCEDURE — 96375 TX/PRO/DX INJ NEW DRUG ADDON: CPT

## 2020-09-29 PROCEDURE — 700102 HCHG RX REV CODE 250 W/ 637 OVERRIDE(OP): Performed by: EMERGENCY MEDICINE

## 2020-09-29 RX ORDER — OXYCODONE HYDROCHLORIDE 5 MG/1
10 TABLET ORAL ONCE
Status: COMPLETED | OUTPATIENT
Start: 2020-09-30 | End: 2020-09-29

## 2020-09-29 RX ORDER — METHYLPREDNISOLONE 4 MG/1
TABLET ORAL
Qty: 21 TAB | Refills: 0 | Status: SHIPPED
Start: 2020-09-29 | End: 2020-10-01

## 2020-09-29 RX ORDER — DICLOFENAC SODIUM 75 MG/1
75 TABLET, DELAYED RELEASE ORAL 2 TIMES DAILY
Qty: 60 TAB | Refills: 0 | Status: SHIPPED
Start: 2020-09-29 | End: 2020-11-09

## 2020-09-29 RX ORDER — CYCLOBENZAPRINE HCL 10 MG
10 TABLET ORAL ONCE
Status: COMPLETED | OUTPATIENT
Start: 2020-09-30 | End: 2020-09-29

## 2020-09-29 RX ORDER — METHOCARBAMOL 500 MG/1
500 TABLET, FILM COATED ORAL 4 TIMES DAILY
Qty: 120 TAB | Refills: 0 | Status: SHIPPED
Start: 2020-09-29 | End: 2020-11-09

## 2020-09-29 RX ADMIN — OXYCODONE HYDROCHLORIDE 10 MG: 5 TABLET ORAL at 23:49

## 2020-09-29 RX ADMIN — DEXAMETHASONE SODIUM PHOSPHATE 4 MG: 4 INJECTION, SOLUTION INTRA-ARTICULAR; INTRALESIONAL; INTRAMUSCULAR; INTRAVENOUS; SOFT TISSUE at 05:23

## 2020-09-29 RX ADMIN — ENOXAPARIN SODIUM 40 MG: 40 INJECTION SUBCUTANEOUS at 05:23

## 2020-09-29 RX ADMIN — CYCLOBENZAPRINE 10 MG: 10 TABLET, FILM COATED ORAL at 23:48

## 2020-09-29 ASSESSMENT — PAIN DESCRIPTION - PAIN TYPE: TYPE: ACUTE PAIN

## 2020-09-29 ASSESSMENT — FIBROSIS 4 INDEX: FIB4 SCORE: 0.8

## 2020-09-29 NOTE — PROGRESS NOTES
Neuro Surg at bedside. Out pt physical therapy order received, Cleared for discharge. Hospitalist updated.

## 2020-09-29 NOTE — PROGRESS NOTES
Neurosurgery Progress Note    Subjective:  Seen in conjunction with Dr Maxwell  Doing great, pain minimal, amb, ready to go home      Exam:    BLE 5/5      BP  Min: 129/87  Max: 140/76  Pulse  Av.8  Min: 66  Max: 75  Resp  Av.4  Min: 16  Max: 17  Temp  Av.5 °C (97.7 °F)  Min: 36.3 °C (97.3 °F)  Max: 36.6 °C (97.9 °F)  SpO2  Av %  Min: 92 %  Max: 94 %    No data recorded    Recent Labs     20  1415 20  0540   WBC 9.1 13.5*   RBC 5.51 4.91   HEMOGLOBIN 16.3 14.6   HEMATOCRIT 48.5 44.6   MCV 88.0 90.8   MCH 29.6 29.7   MCHC 33.6* 32.7*   RDW 38.1 41.8   PLATELETCT 188 184   MPV 9.8 10.5     Recent Labs     20  1415 20  0540   SODIUM 140 139   POTASSIUM 4.3 4.3   CHLORIDE 103 106   CO2 24 19*   GLUCOSE 102* 154*   BUN 15 20   CREATININE 1.03 0.74   CALCIUM 9.5 8.9               Intake/Output       20 - 20 - 20 0659       Total  Total       Intake    P.O.  --  400 400  --  -- --    P.O. -- 400 400 -- -- --    Total Intake -- 400 400 -- -- --       Output    Urine  --  -- --  --  -- --    Number of Times Voided -- 1 x 1 x -- -- --    Total Output -- -- -- -- -- --       Net I/O     -- 400 400 -- -- --            Intake/Output Summary (Last 24 hours) at 2020 0836  Last data filed at 2020 2100  Gross per 24 hour   Intake 400 ml   Output --   Net 400 ml            • senna-docusate  2 Tab BID    And   • polyethylene glycol/lytes  1 Packet QDAY PRN    And   • magnesium hydroxide  30 mL QDAY PRN    And   • bisacodyl  10 mg QDAY PRN   • enoxaparin  40 mg DAILY   • ondansetron  4 mg Q4HRS PRN   • ondansetron  4 mg Q4HRS PRN   • promethazine  12.5-25 mg Q4HRS PRN   • promethazine  12.5-25 mg Q4HRS PRN   • prochlorperazine  5-10 mg Q4HRS PRN   • diclofenac DR  75 mg BID   • lidocaine  1 Patch Q24HR   • methocarbamol  500 mg 4X/DAY   • Pharmacy Consult Request  1 Each PHARMACY TO DOSE   • oxyCODONE  immediate-release  5 mg Q3HRS PRN   • oxyCODONE immediate release  10 mg Q3HRS PRN   • morphine injection  4 mg Q3HRS PRN   • dexamethasone  4 mg Q6HRS       Assessment and Plan:  Hospital day # 3 intractable pain, s/p L4-5 fusion 7 yrs ago, 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

## 2020-09-29 NOTE — PROGRESS NOTES
Following rounds with pt, MD would like to cancel US of liver, Per pt following liver specialist as out pt.

## 2020-09-29 NOTE — PROGRESS NOTES
Bedside report received at 1900. Assessment done. VSS. Telemonitored,SR. AOx4. Unlabored and even breathing,RA. 1/10 back pain at this time, declines tx. Skin intact. IV saline locked. 100% dinner finished. Hourly patient rounding done. Fall precautions in place.  Call light in place, bed locked and in lowest position. White board updated. Reviewed POC. All questions answered at this time.

## 2020-09-29 NOTE — PROGRESS NOTES
US contacted RN, MD would like US liver due to lab findings; Pt updated, awaiting MD bedside rounds.

## 2020-09-29 NOTE — DISCHARGE SUMMARY
Discharge Summary    CHIEF COMPLAINT ON ADMISSION  Chief Complaint   Patient presents with   • Back Pain     History of surgery at site, pain for 2 months increasing in severity, seen at [facility] 9/25/20 for same complaint       Reason for Admission  Back Pain     Admission Date  9/27/2020    CODE STATUS  Full Code    HPI & HOSPITAL COURSE  Mr. Zachery Esquivel is a 54 y.o. male with history of lumbar laminectomy discectomy by Dr. Ruth in [facility] 7 years ago who presented on 9/27/2020 with intractable lower back pain rating to bilateral thighs x2 weeks.  Exacerbated by any movement.  Initially presented to [facility] and given IV Toradol, Dilaudid, muscle relaxants and was discharged with oxycodone and tizanidine.  Lower back pain was persistent she presented again to the emergency room.  Denied any focal weakness but was having difficulty with urination.  MRI lumbar spine without contrast showed mild-moderate spinal stenosis at L3-4, left paracentral disc extrusion moderately narrowing the left lateral recess and foramen at L5-S1.  He was started on Decadron.  Neurosurgery was consulted and recommended outpatient physical therapy, follow-up outpatient in office.  Back pain was significantly improved on pain regimen.  Your condition from Decadron to Medrol Dosepak at the time of discharge.  Patient did have elevated LFTs which he states he always gets when taking Tylenol and is previously followed with liver specialist.  He denies any nausea, vomiting, abdominal pain.  Medically cleared for discharge home.  Follow-up with neurosurgery as outpatient.  Outpatient physical therapy.       Therefore, he is discharged in fair and stable condition to home with close outpatient follow-up.    The patient met 2-midnight criteria for an inpatient stay at the time of discharge.    Discharge Date  9/29/2020    FOLLOW UP ITEMS POST DISCHARGE  None    DISCHARGE DIAGNOSES  Active Problems:     Obesity (BMI 30-39.9) POA: Yes    Acute low back pain with sciatica POA: Yes    Elevated LFTs POA: No  Resolved Problems:    * No resolved hospital problems. *      FOLLOW UP  Dima Maxwell M.D.  [address]  [address]  [place] [address]  [phone]    In 6 weeks  As needed, If symptoms worsen      MEDICATIONS ON DISCHARGE     Medication List      START taking these medications      Instructions   diclofenac DR 75 MG Tbec  Commonly known as: Voltaren   Take 1 Tab by mouth 2 Times a Day.  Dose: 75 mg     methocarbamol 500 MG Tabs  Commonly known as: ROBAXIN   Take 1 Tab by mouth 4 times a day.  Dose: 500 mg     methylPREDNISolone 4 MG Tbpk  Commonly known as: MEDROL DOSEPAK   Follow schedule on package instructions.        CONTINUE taking these medications      Instructions   oxyCODONE immediate-release 5 MG Tabs  Commonly known as: ROXICODONE   Take 1 Tab by mouth every four hours as needed for Severe Pain for up to 3 days.  Dose: 5 mg     tizanidine 4 MG Tabs  Commonly known as: ZANAFLEX   Take 1 Tab by mouth every 6 hours as needed.  Dose: 4 mg            Allergies  Allergies   Allergen Reactions   • Penicillins Rash     .   • Tylenol      Can't take due to elevated liver enzymes   • Bactrim [Sulfamethoxazole W-Trimethoprim]      Itching rash palms and soles       DIET  Orders Placed This Encounter   Procedures   • Diet Order Regular     Standing Status:   Standing     Number of Occurrences:   1     Order Specific Question:   Diet:     Answer:   Regular [1]     Order Specific Question:   Miscellaneous modifications:     Answer:   Gluten Free per PT [10]       ACTIVITY  As tolerated.  Weight bearing as tolerated    CONSULTATIONS  Neurosurgery    PROCEDURES  None    LABORATORY  Lab Results   Component Value Date    SODIUM 139 09/29/2020    POTASSIUM 4.3 09/29/2020    CHLORIDE 106 09/29/2020    CO2 19 (L) 09/29/2020    GLUCOSE 154 (H) 09/29/2020    BUN 20 09/29/2020    CREATININE 0.74 09/29/2020        Lab Results    Component Value Date    WBC 13.5 (H) 09/29/2020    HEMOGLOBIN 14.6 09/29/2020    HEMATOCRIT 44.6 09/29/2020    PLATELETCT 184 09/29/2020        I discussed medications and side effects with the patient.  I discussed prognosis and importance of medical compliance with the patient.  I counseled the patient about diet, exercise, weight loss, smoking cessation, and life style modifications.  All questions and concerns have been addressed.  Total time of the discharge process was 34 minutes.

## 2020-09-29 NOTE — CARE PLAN
Problem: Safety  Goal: Will remain free from injury  Outcome: PROGRESSING AS EXPECTED  Goal: Will remain free from falls  Outcome: PROGRESSING AS EXPECTED     Problem: Pain Management  Goal: Pain level will decrease to patient's comfort goal  Outcome: PROGRESSING AS EXPECTED     Problem: Infection  Goal: Will remain free from infection  Outcome: PROGRESSING AS EXPECTED    Pain control, neurosurgery. Patient education on hand hygiene and infection prevention measures. Fall precautions in place, educated to call for assistance. POC discussed, verbalized understanding. Pain assessed, interventions appropriate.

## 2020-09-29 NOTE — DISCHARGE INSTRUCTIONS
Discharge Instructions    Discharged to home by car with relative. Discharged via wheelchair, hospital escort: Yes.  Special equipment needed: Not Applicable    Be sure to schedule a follow-up appointment with your primary care doctor or any specialists as instructed.     Discharge Plan:   Influenza Vaccine Indication: Patient Refuses  Influenza Vaccine Given - only chart on this line when given: Influenza Vaccine Given (See MAR)    I understand that a diet low in cholesterol, fat, and sodium is recommended for good health. Unless I have been given specific instructions below for another diet, I accept this instruction as my diet prescription.   Other diet: Heart Healthy     Special Instructions: None    · Is patient discharged on Warfarin / Coumadin?   No     Depression / Suicide Risk    As you are discharged from this [facility] facility, it is important to learn how to keep safe from harming yourself.    Recognize the warning signs:  · Abrupt changes in personality, positive or negative- including increase in energy   · Giving away possessions  · Change in eating patterns- significant weight changes-  positive or negative  · Change in sleeping patterns- unable to sleep or sleeping all the time   · Unwillingness or inability to communicate  · Depression  · Unusual sadness, discouragement and loneliness  · Talk of wanting to die  · Neglect of personal appearance   · Rebelliousness- reckless behavior  · Withdrawal from people/activities they love  · Confusion- inability to concentrate     If you or a loved one observes any of these behaviors or has concerns about self-harm, here's what you can do:  · Talk about it- your feelings and reasons for harming yourself  · Remove any means that you might use to hurt yourself (examples: pills, rope, extension cords, firearm)  · Get professional help from the community (Mental Health, Substance Abuse, psychological counseling)  · Do not be alone:Call your Safe Contact-  someone whom you trust who will be there for you.  · Call your local CRISIS HOTLINE 820-6568 or 408-322-1069  · Call your local Children's Mobile Crisis Response Team [place] (591) 410-6294 or www.[place]  · Call the toll free National Suicide Prevention Hotlines   · National Suicide Prevention Lifeline 283-777-GAXK (5555)  · [facility] Line Network 800-SUICIDE (853-2789)      Discharge Instructions per [email or web address]    DIET: Diet Order Regular    ACTIVITY: As tolerated    A proper diet that is low in grease, fat, and salt, along with 30 minutes of exercise per day will lead to weight loss, and better controlled blood sugar and blood pressure.    DIAGNOSIS: Intractable back pain    Follow up with your Primary Care Provider Salima Benz P.A.-C. as scheduled or sooner if your symptoms persist or worsen.  Return to Emergency Room for sever chest pain, shortness of breath, signs of a stroke, or any other emergencies.        Chronic Back Pain  When back pain lasts longer than 3 months, it is called chronic back pain. Pain may get worse at certain times (flare-ups). There are things you can do at home to manage your pain.  Follow these instructions at home:  Activity         · Avoid bending and other activities that make pain worse.  · When standing:  ? Keep your upper back and neck straight.  ? Keep your shoulders pulled back.  ? Avoid slouching.  · When sitting:  ? Keep your back straight.  ? Relax your shoulders. Do not round your shoulders or pull them backward.  · Do not sit or  one place for long periods of time.  · Take short rest breaks during the day. Lying down or standing is usually better than sitting. Resting can help relieve pain.  · When sitting or lying down for a long time, do some mild activity or stretching. This will help to prevent stiffness and pain.  · Get regular exercise. Ask your doctor what activities are safe for you.  · Do not lift anything that is heavier  than 10 lb (4.5 kg). To prevent injury when you lift things:  ? Bend your knees.  ? Keep the weight close to your body.  ? Avoid twisting.  Managing pain  · If told, put ice on the painful area. Your doctor may tell you to use ice for 24-48 hours after a flare-up starts.  ? Put ice in a plastic bag.  ? Place a towel between your skin and the bag.  ? Leave the ice on for 20 minutes, 2-3 times a day.  · If told, put heat on the painful area as often as told by your doctor. Use the heat source that your doctor recommends, such as a moist heat pack or a heating pad.  ? Place a towel between your skin and the heat source.  ? Leave the heat on for 20-30 minutes.  ? Remove the heat if your skin turns bright red. This is especially important if you are unable to feel pain, heat, or cold. You may have a greater risk of getting burned.  · Soak in a warm bath. This can help relieve pain.  · Take over-the-counter and prescription medicines only as told by your doctor.  General instructions  · Sleep on a firm mattress. Try lying on your side with your knees slightly bent. If you lie on your back, put a pillow under your knees.  · Keep all follow-up visits as told by your doctor. This is important.  Contact a doctor if:  · You have pain that does not get better with rest or medicine.  Get help right away if:  · One or both of your arms or legs feel weak.  · One or both of your arms or legs lose feeling (numbness).  · You have trouble controlling when you poop (bowel movement) or pee (urinate).  · You feel sick to your stomach (nauseous).  · You throw up (vomit).  · You have belly (abdominal) pain.  · You have shortness of breath.  · You pass out (faint).  Summary  · When back pain lasts longer than 3 months, it is called chronic back pain.  · Pain may get worse at certain times (flare-ups).  · Use ice and heat as told by your doctor. Your doctor may tell you to use ice after flare-ups.  This information is not intended to replace  advice given to you by your health care provider. Make sure you discuss any questions you have with your health care provider.  Document Released: 06/05/2009 Document Revised: 04/09/2020 Document Reviewed: 08/02/2018  Elsevier Patient Education © 2020 [employer]

## 2020-09-29 NOTE — PROGRESS NOTES
Bedside report received 0700. POC discussed with pt; Pt denies pain; Ambulating without assistance; No overnight events; Pending DC; Pt provided shower; all questions answered at this time.

## 2020-09-30 VITALS
WEIGHT: 239.42 LBS | BODY MASS INDEX: 32.43 KG/M2 | TEMPERATURE: 98.6 F | OXYGEN SATURATION: 93 % | RESPIRATION RATE: 18 BRPM | DIASTOLIC BLOOD PRESSURE: 74 MMHG | HEART RATE: 70 BPM | SYSTOLIC BLOOD PRESSURE: 138 MMHG | HEIGHT: 72 IN

## 2020-09-30 PROCEDURE — 700111 HCHG RX REV CODE 636 W/ 250 OVERRIDE (IP): Performed by: EMERGENCY MEDICINE

## 2020-09-30 RX ORDER — HALOPERIDOL 5 MG/ML
5 INJECTION INTRAMUSCULAR ONCE
Status: COMPLETED | OUTPATIENT
Start: 2020-09-30 | End: 2020-09-30

## 2020-09-30 RX ORDER — LORAZEPAM 2 MG/ML
0.5 INJECTION INTRAMUSCULAR ONCE
Status: COMPLETED | OUTPATIENT
Start: 2020-09-30 | End: 2020-09-30

## 2020-09-30 RX ADMIN — HALOPERIDOL LACTATE 5 MG: 5 INJECTION, SOLUTION INTRAMUSCULAR at 00:26

## 2020-09-30 RX ADMIN — LORAZEPAM 0.5 MG: 2 INJECTION INTRAMUSCULAR; INTRAVENOUS at 00:44

## 2020-09-30 ASSESSMENT — ENCOUNTER SYMPTOMS
BACK PAIN: 1
SHORTNESS OF BREATH: 0
EYES NEGATIVE: 1
WEAKNESS: 0
CONSTITUTIONAL NEGATIVE: 1
NECK PAIN: 0
FOCAL WEAKNESS: 0
GASTROINTESTINAL NEGATIVE: 1
MYALGIAS: 0
HALLUCINATIONS: 0
CARDIOVASCULAR NEGATIVE: 1
ABDOMINAL PAIN: 0
FEVER: 0
EYE PAIN: 0
RESPIRATORY NEGATIVE: 1
SORE THROAT: 0
SEIZURES: 0
BRUISES/BLEEDS EASILY: 0
BLOOD IN STOOL: 0
HEADACHES: 0
FLANK PAIN: 0

## 2020-09-30 NOTE — ED PROVIDER NOTES
ED Provider Note    CHIEF COMPLAINT  Chief Complaint   Patient presents with   • Back Pain       HPI  HPI     Zachery Esquivel is a 54 y.o. male with history of lumbar laminectomy discectomy by Dr. Ruth in [facility] 7 years ago who presents to the ED with intractable lower back pain rating to bilateral thighs x2 weeks.  Exacerbated by any movement.     Denies any focal weakness, no longer having difficulty with urination.  MRI lumbar spine without contrast showed mild-moderate spinal stenosis at L3-4, left paracentral disc extrusion moderately narrowing the left lateral recess and foramen at L5-S1.  He was started on Decadron.      Neurosurgery was consulted on last admission and recommended outpatient physical therapy, follow-up outpatient in office.    He denies any nausea, vomiting, abdominal pain.      Patient describes back pain as cramping and achy in sensation radiating down bilateral thighs.      REVIEW OF SYSTEMS  Review of Systems   Constitutional: Negative.  Negative for fever.   HENT: Negative.  Negative for ear pain and sore throat.    Eyes: Negative.  Negative for pain.   Respiratory: Negative.  Negative for shortness of breath.    Cardiovascular: Negative.  Negative for chest pain.   Gastrointestinal: Negative.  Negative for abdominal pain and blood in stool.   Genitourinary: Negative for dysuria and flank pain.   Musculoskeletal: Positive for back pain. Negative for myalgias and neck pain.   Skin: Negative.  Negative for rash.   Neurological: Negative for focal weakness, seizures, weakness and headaches.   Endo/Heme/Allergies: Does not bruise/bleed easily.   Psychiatric/Behavioral: Negative for hallucinations and suicidal ideas.   All other systems reviewed and are negative.      PAST MEDICAL HISTORY   has a past medical history of Low back pain and S/P laparoscopic fundoplication.    SOCIAL HISTORY  Social History     Tobacco Use   • Smoking status: Never Smoker   • Smokeless tobacco: Never  Used   Substance and Sexual Activity   • Alcohol use: Yes     Alcohol/week: 1.0 oz     Types: 2 Glasses of wine per week     Comment: Occasionally   • Drug use: No   • Sexual activity: Yes     Partners: Female       SURGICAL HISTORY   has a past surgical history that includes abdominal exploration; nissen fundoplication laparoscopic; appendectomy; cholecystectomy; and lumbar laminectomy diskectomy.    CURRENT MEDICATIONS  Home Medications    **Home medications have not yet been reviewed for this encounter**         ALLERGIES  Allergies   Allergen Reactions   • Penicillins Rash     .   • Tylenol      Can't take due to elevated liver enzymes   • Bactrim [Sulfamethoxazole W-Trimethoprim]      Itching rash palms and soles       PHYSICAL EXAM  VITAL SIGNS: /74   Pulse 70   Temp 37 °C (98.6 °F) (Temporal)   Resp 18   Ht 1.829 m (6')   Wt 108.6 kg (239 lb 6.7 oz)   SpO2 93%   BMI 32.47 kg/m²  @GUERLINE[[id number]::@  Pulse ox interpretation: I interpret this pulse ox as normal.    Physical Exam   Constitutional: He is oriented to person, place, and time and well-developed, well-nourished, and in no distress.   HENT:   Head: Normocephalic.   Right Ear: External ear normal.   Left Ear: External ear normal.   Mouth/Throat: No oropharyngeal exudate.   Eyes: Pupils are equal, round, and reactive to light. EOM are normal. No scleral icterus.   Neck: Normal range of motion.   Cardiovascular: Normal rate.   Pulmonary/Chest: Effort normal. No respiratory distress.   Abdominal: He exhibits no distension. There is no abdominal tenderness.   Musculoskeletal: Normal range of motion.         General: No deformity.   Neurological: He is alert and oriented to person, place, and time. Coordination normal.   Negative Josiah's test bilaterally.  No midline CT or L-spine tenderness palpation.  Bilateral tenderness to palpation in paraspinal lumbar region.   Skin: Skin is warm and dry. No rash noted. No erythema.   Psychiatric: Affect and  judgment normal.   Nursing note and vitals reviewed.  2+ pulses in bilateral upper extremities    DIAGNOSTIC STUDIES / PROCEDURES    LABS/EKG  Results for orders placed or performed during the hospital encounter of 09/27/20   CBC with Differential   Result Value Ref Range    WBC 13.5 (H) 4.8 - 10.8 K/uL    RBC 4.91 4.70 - 6.10 M/uL    Hemoglobin 14.6 14.0 - 18.0 g/dL    Hematocrit 44.6 42.0 - 52.0 %    MCV 90.8 81.4 - 97.8 fL    MCH 29.7 27.0 - 33.0 pg    MCHC 32.7 (L) 33.7 - 35.3 g/dL    RDW 41.8 35.9 - 50.0 fL    Platelet Count 184 164 - 446 K/uL    MPV 10.5 9.0 - 12.9 fL    Neutrophils-Polys 86.20 (H) 44.00 - 72.00 %    Lymphocytes 6.10 (L) 22.00 - 41.00 %    Monocytes 7.00 0.00 - 13.40 %    Eosinophils 0.00 0.00 - 6.90 %    Basophils 0.10 0.00 - 1.80 %    Immature Granulocytes 0.60 0.00 - 0.90 %    Nucleated RBC 0.00 /100 WBC    Neutrophils (Absolute) 11.64 (H) 1.82 - 7.42 K/uL    Lymphs (Absolute) 0.82 (L) 1.00 - 4.80 K/uL    Monos (Absolute) 0.95 (H) 0.00 - 0.85 K/uL    Eos (Absolute) 0.00 0.00 - 0.51 K/uL    Baso (Absolute) 0.01 0.00 - 0.12 K/uL    Immature Granulocytes (abs) 0.08 0.00 - 0.11 K/uL    NRBC (Absolute) 0.00 K/uL   Comp Metabolic Panel (CMP)   Result Value Ref Range    Sodium 139 135 - 145 mmol/L    Potassium 4.3 3.6 - 5.5 mmol/L    Chloride 106 96 - 112 mmol/L    Co2 19 (L) 20 - 33 mmol/L    Anion Gap 14.0 7.0 - 16.0    Glucose 154 (H) 65 - 99 mg/dL    Bun 20 8 - 22 mg/dL    Creatinine 0.74 0.50 - 1.40 mg/dL    Calcium 8.9 8.5 - 10.5 mg/dL    AST(SGOT) 44 12 - 45 U/L    ALT(SGPT) 267 (H) 2 - 50 U/L    Alkaline Phosphatase 65 30 - 99 U/L    Total Bilirubin 0.6 0.1 - 1.5 mg/dL    Albumin 3.9 3.2 - 4.9 g/dL    Total Protein 6.1 6.0 - 8.2 g/dL    Globulin 2.2 1.9 - 3.5 g/dL    A-G Ratio 1.8 g/dL   ESTIMATED GFR   Result Value Ref Range    GFR If African American >60 >60 mL/min/1.73 m 2    GFR If Non African American >60 >60 mL/min/1.73 m 2       RADIOLOGY  No orders to display        COURSE &  MEDICAL DECISION MAKING  Pertinent Labs & Imaging studies reviewed by me. (See chart for details)  I verified that the patient was wearing a mask and I was wearing appropriate PPE every time I entered the room. The patient's mask was on the patient at all times during my encounter except for a brief view of the oropharynx.     54 y.o. male PMH back surgery p/w CC of back pain.     Differential diagnosis includes but is not limited to:    Initially given oxycodone and cyclobenzaprine  Pt given haldol given concern for home opiates unable to adequately control pain and potential synergistic pain relief with Haldol and nausea improvement.   Only minimal relief was provided with today's emergency department visit however I discussed with both wife and patient the concerns of receiving repeat doses of IV opiates in addition to all the opiate medications that the patient takes at home.  I discussed the typical course of back spasms and back pain along with importance of calling her neurosurgeon tomorrow to schedule follow-up appointment.    No weakness or numbness to suggest cauda equina syndrome.  Pt w/ No trauma. No IVDU/fever. No history of cancer/unintentional weight loss. No bowel/bladder dysfunction. No numbness/weakness/saddle anesthesia.  Doubt infectious etiology given no fever  No imaging ordered at this time given no trauma to suggest fx and pt under age 65, and reviewed recent imaging.   No e/o rash to suggest zoster  Pain likely represents MSK pain.  Pt given below meds for sx relief in ED  Pt agrees to f/u w/ PCP or physical therapist if pain persists for greater than 1 wk.  Pt instructed to return to ED if pain continues to persist or worsens. 1:39 AM patient reassessed by me with mild improvement in back pain however wife does need to go home to get some sleep before work in the morning.    I encourage usage of heating pad and adherence to home medication regimens along with continuing daily  activities  Patient and spouse agree for patient to return if symptoms worsen or progress    FINAL IMPRESSION  Visit Diagnoses     ICD-10-CM   1. Acute bilateral low back pain without sciatica  M54.5              Electronically signed by: Molina Li M.D., 9/29/2020 11:41 PM

## 2020-09-30 NOTE — ED NOTES
Discharge instructions provided.  Pt verbalized the understanding of discharge instructions to follow up with PCP/Neuro and to return to ER if condition worsens.  Pt left ER via WC.

## 2020-09-30 NOTE — ED NOTES
Pt refusing to lay in bed; Pt and Pt wife advised that he needs to get in bed to prevent falling; Pt still requested to sit on side of bed;Pt wife stated that she will watch him;

## 2020-10-01 ENCOUNTER — OFFICE VISIT (OUTPATIENT)
Dept: PHYSICAL MEDICINE AND REHAB | Facility: MEDICAL CENTER | Age: 55
End: 2020-10-01
Payer: COMMERCIAL

## 2020-10-01 VITALS
HEART RATE: 72 BPM | HEIGHT: 72 IN | TEMPERATURE: 97.5 F | SYSTOLIC BLOOD PRESSURE: 128 MMHG | BODY MASS INDEX: 30.48 KG/M2 | WEIGHT: 225 LBS | OXYGEN SATURATION: 92 % | DIASTOLIC BLOOD PRESSURE: 80 MMHG

## 2020-10-01 DIAGNOSIS — M54.16 LUMBAR RADICULOPATHY: ICD-10-CM

## 2020-10-01 DIAGNOSIS — M79.10 MYALGIA: ICD-10-CM

## 2020-10-01 DIAGNOSIS — M48.061 SPINAL STENOSIS OF LUMBAR REGION, UNSPECIFIED WHETHER NEUROGENIC CLAUDICATION PRESENT: ICD-10-CM

## 2020-10-01 DIAGNOSIS — Z98.890 HISTORY OF LUMBOSACRAL SPINE SURGERY: ICD-10-CM

## 2020-10-01 PROCEDURE — 20553 NJX 1/MLT TRIGGER POINTS 3/>: CPT | Performed by: PHYSICAL MEDICINE & REHABILITATION

## 2020-10-01 PROCEDURE — 99205 OFFICE O/P NEW HI 60 MIN: CPT | Mod: 25 | Performed by: PHYSICAL MEDICINE & REHABILITATION

## 2020-10-01 RX ORDER — DEXAMETHASONE SODIUM PHOSPHATE 4 MG/ML
8 INJECTION, SOLUTION INTRA-ARTICULAR; INTRALESIONAL; INTRAMUSCULAR; INTRAVENOUS; SOFT TISSUE ONCE
Status: COMPLETED | OUTPATIENT
Start: 2020-10-01 | End: 2020-10-01

## 2020-10-01 RX ORDER — OXYCODONE HYDROCHLORIDE 5 MG/1
5 TABLET ORAL EVERY 4 HOURS PRN
Qty: 84 TAB | Refills: 0 | Status: SHIPPED | OUTPATIENT
Start: 2020-10-15 | End: 2020-11-05

## 2020-10-01 RX ORDER — OXYCODONE HYDROCHLORIDE 5 MG/1
5 TABLET ORAL EVERY 4 HOURS PRN
Qty: 84 TAB | Refills: 0 | Status: SHIPPED | OUTPATIENT
Start: 2020-10-01 | End: 2020-10-15

## 2020-10-01 RX ORDER — OXYCODONE HYDROCHLORIDE 5 MG/1
5-30 CAPSULE ORAL EVERY 4 HOURS PRN
COMMUNITY
End: 2020-10-13

## 2020-10-01 RX ORDER — GABAPENTIN 300 MG/1
300-600 CAPSULE ORAL 3 TIMES DAILY PRN
Qty: 180 CAP | Refills: 3 | Status: SHIPPED | OUTPATIENT
Start: 2020-10-01 | End: 2020-11-09

## 2020-10-01 RX ORDER — NALOXONE HYDROCHLORIDE 4 MG/.1ML
1 SPRAY NASAL PRN
Qty: 1 EACH | Refills: 0 | Status: SHIPPED | OUTPATIENT
Start: 2020-10-01 | End: 2020-11-09

## 2020-10-01 RX ADMIN — DEXAMETHASONE SODIUM PHOSPHATE 8 MG: 4 INJECTION, SOLUTION INTRA-ARTICULAR; INTRALESIONAL; INTRAMUSCULAR; INTRAVENOUS; SOFT TISSUE at 17:18

## 2020-10-01 ASSESSMENT — FIBROSIS 4 INDEX: FIB4 SCORE: 0.8

## 2020-10-01 ASSESSMENT — PAIN SCALES - GENERAL: PAINLEVEL: 9=SEVERE PAIN

## 2020-10-01 ASSESSMENT — PATIENT HEALTH QUESTIONNAIRE - PHQ9: CLINICAL INTERPRETATION OF PHQ2 SCORE: 0

## 2020-10-01 ASSESSMENT — ENCOUNTER SYMPTOMS
LEG PAIN: 1
NUMBNESS: 1
BACK PAIN: 1
WEAKNESS: 0
TINGLING: 1
PARESIS: 0

## 2020-10-01 NOTE — PROGRESS NOTES
New patient note    Physiatry (physical medicine and  Rehabilitation), interventional spine and sports medicine    Date of service: See epic    Chief complaint:   Chief Complaint   Patient presents with   • New Patient     Back pain        Referring provider: Yamil Lui M.D.    HISTORY    HPI: Zachery Esquivel 54 y.o.  who presents today with Diagnoses of Lumbar radiculopathy, Spinal stenosis of lumbar region, unspecified whether neurogenic claudication present, History of lumbosacral spine surgery L4-5 fusion done by Dr Barrie Espinoza, and Myalgia were pertinent to this visit.    Back Pain  Chronicity: acute on chronic. Episode onset: worse over the past week. The problem occurs constantly. The problem has been gradually worsening since onset. The pain is present in the gluteal, lumbar spine and sacro-iliac. Radiates to: bilateral anterior thigh. The pain is at a severity of 9/10. The symptoms are aggravated by bending, position, standing and twisting. Associated symptoms include leg pain, numbness and tingling. Pertinent negatives include no paresis or weakness. He has tried analgesics, heat, home exercises, ice, muscle relaxant, NSAIDs and walking for the symptoms. The treatment provided no relief.     Medications tried oxycodone, Zanaflex, Flexeril, Medrol Dosepak, diclofenac, Robaxin    Had elevated LFT on tylenol in the past.      surgery previously done by Dr Barrie Espinoza in [place].     Medical records review:  I reviewed the note from the referring provider Yamil Lui M.D. including the note dated 10/1/2020.  He knows the patient's previous surgery and likely radiculopathy above the level of the fusion.          ROS:   Red Flags ROS:   Fever, Chills, Sweats: Denies  Involuntary Weight Loss: Denies  Bladder Incontinence: Denies  Bowel Incontinence: denies  Saddle Anesthesia: Denies    All other systems reviewed and negative.       PMHx:   Past Medical History:   Diagnosis Date   • Low back pain    •  S/P laparoscopic fundoplication          Current Outpatient Medications on File Prior to Visit   Medication Sig Dispense Refill   • oxycodone 5 MG capsule Take 5-30 mg by mouth every four hours as needed.     • diclofenac DR (VOLTAREN) 75 MG Tablet Delayed Response Take 1 Tab by mouth 2 Times a Day. 60 Tab 0   • methocarbamol (ROBAXIN) 500 MG Tab Take 1 Tab by mouth 4 times a day. 120 Tab 0     No current facility-administered medications on file prior to visit.         PSHx:   Past Surgical History:   Procedure Laterality Date   • ABDOMINAL EXPLORATION     • APPENDECTOMY     • CHOLECYSTECTOMY     • LUMBAR LAMINECTOMY DISKECTOMY      l4-5 with fusion   • NISSEN FUNDOPLICATION LAPAROSCOPIC         Family history   Family History   Problem Relation Age of Onset   • Diabetes Father    • Diabetes Paternal Grandmother    • Cancer Sister 35        breast   • Heart Disease Neg Hx    • Stroke Neg Hx    • Hypertension Neg Hx    • Hyperlipidemia Neg Hx          Medications: reviewed on epic.   Outpatient Medications Marked as Taking for the 10/1/20 encounter (Office Visit) with Jack Larson M.D.   Medication Sig Dispense Refill   • oxycodone 5 MG capsule Take 5-30 mg by mouth every four hours as needed.     • oxyCODONE immediate-release (ROXICODONE) 5 MG Tab Take 1 Tab by mouth every four hours as needed for Severe Pain for up to 14 days. 84 Tab 0   • gabapentin (NEURONTIN) 300 MG Cap Take 1-2 Caps by mouth 3 times a day as needed (pain). 180 Cap 3   • Naloxone (NARCAN) 4 MG/0.1ML Liquid Spray 4 mg in nose as needed (For severe sleepiness or difficulty breathing from possible overdose. Call 911 after administration.). 1 Each 0   • [START ON 10/15/2020] oxyCODONE immediate-release (ROXICODONE) 5 MG Tab Take 1 Tab by mouth every four hours as needed for Severe Pain for up to 14 days. 84 Tab 0   • diclofenac DR (VOLTAREN) 75 MG Tablet Delayed Response Take 1 Tab by mouth 2 Times a Day. 60 Tab 0   • methocarbamol (ROBAXIN)  500 MG Tab Take 1 Tab by mouth 4 times a day. 120 Tab 0        Allergies:   Allergies   Allergen Reactions   • Penicillins Rash     .   • Tylenol      Can't take due to elevated liver enzymes   • Bactrim [Sulfamethoxazole W-Trimethoprim]      Itching rash palms and soles       Social Hx:   Social History     Socioeconomic History   • Marital status:      Spouse name: Not on file   • Number of children: Not on file   • Years of education: Not on file   • Highest education level: Not on file   Occupational History   • Not on file   Social Needs   • Financial resource strain: Not on file   • Food insecurity     Worry: Not on file     Inability: Not on file   • Transportation needs     Medical: Not on file     Non-medical: Not on file   Tobacco Use   • Smoking status: Never Smoker   • Smokeless tobacco: Never Used   Substance and Sexual Activity   • Alcohol use: Yes     Alcohol/week: 1.0 oz     Types: 2 Glasses of wine per week     Comment: Occasionally   • Drug use: No   • Sexual activity: Yes     Partners: Female   Lifestyle   • Physical activity     Days per week: Not on file     Minutes per session: Not on file   • Stress: Not on file   Relationships   • Social connections     Talks on phone: Not on file     Gets together: Not on file     Attends Synagogue service: Not on file     Active member of club or organization: Not on file     Attends meetings of clubs or organizations: Not on file     Relationship status: Not on file   • Intimate partner violence     Fear of current or ex partner: Not on file     Emotionally abused: Not on file     Physically abused: Not on file     Forced sexual activity: Not on file   Other Topics Concern   •  Service No   • Blood Transfusions Yes   • Caffeine Concern No   • Occupational Exposure No   • Hobby Hazards No   • Sleep Concern Yes   • Stress Concern Yes   • Weight Concern No   • Special Diet No   • Back Care Yes     Comment: back brace   • Exercise Yes   • Bike  Helmet No   • Seat Belt Yes   • Self-Exams No   Social History Narrative   • Not on file         EXAMINATION     Physical Exam:   Vitals: /80 (BP Location: Right arm, Patient Position: Sitting, BP Cuff Size: Adult)   Pulse 72   Temp 36.4 °C (97.5 °F) (Temporal)   Ht 1.829 m (6')   Wt 102.1 kg (225 lb)   SpO2 92%     Constitutional:   Body Habitus: Body mass index is 30.52 kg/m².  Cooperation: Fully cooperates with exam  Appearance: Well-groomed, well-nourished, not disheveled     Eyes: No scleral icterus to suggest severe liver disease, no proptosis to suggest severe hyperthyroid    ENT -no obvious auditory deficits, no obvious tongue lesions, tongue midline, no facial droop     Skin -no rashes or lesions noted     Respiratory-  breathing comfortable on room air, no audible wheezing    Cardiovascular- capillary refills less than 2 seconds. No lower extremity edema is noted.     Gastrointestinal - no obvious abdominal masses, No tenderness to palpation in the abdomen    Psychiatric- alert and oriented ×3. Normal affect.     Gait - normal gait, no use of ambulatory device, nonantalgic.     Musculoskeletal and Neuro -         Thoracic/Lumbar Spine/Sacral Spine/Hips   Inspection: No evidence of atrophy in bilateral lower extremities throughout     ROM: decreased active range of motion with flexion, lateral flexion, and rotation bilaterally.   There is decreased active range of motion with lumbar extension with pain.    There is pain with facet loading maneuver (extension rotation) with axial low back pain on the BILATERAL side(s)    Palpation:   No tenderness to palpation in midline at T1-T12 levels. No tenderness to palpation in the left and right of the midline T1-L5, NEGATIVE for tenderness to palpation to the para-midline region in the lower lumbar levels.  palpation over SI joint: negative bilaterally    palpation in hip or over the gluteus medius tendon insertion: negative bilaterally positive for  multiple trigger points palpated which reproduce the patient's pain.    Lumbar spine Special tests  Neuro tension  Straight leg test positive bilaterally    Slump test positive bilaterally    Femoral stretch test is positive bilaterally      HIP  FAIR test negative bilaterally    Range of motion in the RIGHT hip is full  in flexion, extension, abduction, internal rotation, external rotation.  Range of motion in the LEFT hip is full  in flexion, extension, abduction, internal rotation, external rotation.      SI joint tests  Observation patient sits on one buttocks: Negative  SI joint compression negative bilaterally    SI joint distraction negative bilaterally    Thigh thrust test negative bilaterally    RAJNI test negative bilaterally                 Key points for the international standards for neurological classification of spinal cord injury (ISNCSCI) to light touch.     Dermatome R L                                      L2 2 2   L3 2 2   L4 2 2   L5 2 2   S1 2 2   S2 2 2       Motor Exam Lower Extremities    ? Myotome R L   Hip flexion L2 5 5   Knee extension L3 5 5   Ankle dorsiflexion L4 5 5   Toe extension L5 5 5   Ankle plantarflexion S1 5 5         Reflexes  ?  R L                Patella  2+ 2+   Achilles   2+ 2+       Babinski sign negative bilaterally   Clonus of the ankle negative bilaterally       MEDICAL DECISION MAKING    Medical records review: see under HPI section.     DATA    Labs:   Lab Results   Component Value Date/Time    SODIUM 139 09/29/2020 05:40 AM    POTASSIUM 4.3 09/29/2020 05:40 AM    CHLORIDE 106 09/29/2020 05:40 AM    CO2 19 (L) 09/29/2020 05:40 AM    ANION 14.0 09/29/2020 05:40 AM    GLUCOSE 154 (H) 09/29/2020 05:40 AM    BUN 20 09/29/2020 05:40 AM    CREATININE 0.74 09/29/2020 05:40 AM    CALCIUM 8.9 09/29/2020 05:40 AM    ASTSGOT 44 09/29/2020 05:40 AM    ALTSGPT 267 (H) 09/29/2020 05:40 AM    TBILIRUBIN 0.6 09/29/2020 05:40 AM    ALBUMIN 3.9 09/29/2020 05:40 AM    TOTPROTEIN 6.1  09/29/2020 05:40 AM    GLOBULIN 2.2 09/29/2020 05:40 AM    AGRATIO 1.8 09/29/2020 05:40 AM   ]    No results found for: PROTHROMBTM, INR     Lab Results   Component Value Date/Time    WBC 13.5 (H) 09/29/2020 05:40 AM    RBC 4.91 09/29/2020 05:40 AM    HEMOGLOBIN 14.6 09/29/2020 05:40 AM    HEMATOCRIT 44.6 09/29/2020 05:40 AM    MCV 90.8 09/29/2020 05:40 AM    MCH 29.7 09/29/2020 05:40 AM    MCHC 32.7 (L) 09/29/2020 05:40 AM    MPV 10.5 09/29/2020 05:40 AM    NEUTSPOLYS 86.20 (H) 09/29/2020 05:40 AM    LYMPHOCYTES 6.10 (L) 09/29/2020 05:40 AM    MONOCYTES 7.00 09/29/2020 05:40 AM    EOSINOPHILS 0.00 09/29/2020 05:40 AM    BASOPHILS 0.10 09/29/2020 05:40 AM        Lab Results   Component Value Date/Time    HBA1C 5.6% 06/25/2015 02:00 PM        Imaging:   I personally reviewed following images, these are my reads  MRI lumbar spine 9/27/2020  At L5-S1 there is a disc protrusion.  This is causing mild to moderate central canal stenosis and moderate bilateral stenosis right worse than left.  History of L4-5 posterior fusion.  At L3-4 there is moderate to severe central canal stenosis and bilateral neuroforaminal stenosis.  Facet arthropathy at L3-4 and L5-S1.  There are no acute fractures.      IMAGING radiology reads. I reviewed the following radiology reads                                                     Results for orders placed during the hospital encounter of 09/27/20   MR-LUMBAR SPINE-W/O    Impression 1.  L3-4 mild-moderate spinal stenosis    2.  L5-S1 left paracentral disc extrusion moderately narrowing the left lateral recess and foramen    3.  L4-5 postoperative changes appear within normal limits                                            CT L spine 9/26/2020         IMPRESSION:     1.  Bilateral L4 and L5 pedicle screw and left L5 laminectomy. Within normal limits     2.  Facet arthropathy                                                                                                   Diagnosis   Visit  Diagnoses     ICD-10-CM   1. Lumbar radiculopathy  M54.16   2. Spinal stenosis of lumbar region, unspecified whether neurogenic claudication present  M48.061   3. History of lumbosacral spine surgery L4-5 fusion done by Dr Barrie Espinoza  Z98.890   4. Myalgia  M79.10           ASSESSMENT AND PLAN:  Zachery Esquivel 54 y.o. male      Zachery was seen today for new patient.    Diagnoses and all orders for this visit:    Lumbar radiculopathy  -     oxyCODONE immediate-release (ROXICODONE) 5 MG Tab; Take 1 Tab by mouth every four hours as needed for Severe Pain for up to 14 days.  -     gabapentin (NEURONTIN) 300 MG Cap; Take 1-2 Caps by mouth 3 times a day as needed (pain).  -     Naloxone (NARCAN) 4 MG/0.1ML Liquid; Spray 4 mg in nose as needed (For severe sleepiness or difficulty breathing from possible overdose. Call 911 after administration.).  -     oxyCODONE immediate-release (ROXICODONE) 5 MG Tab; Take 1 Tab by mouth every four hours as needed for Severe Pain for up to 14 days.  -     Consent for Opiate Prescription  -     Controlled Substance Treatment Agreement  -     REFERRAL TO PHYSICAL MEDICINE REHAB    Spinal stenosis of lumbar region, unspecified whether neurogenic claudication present  -     oxyCODONE immediate-release (ROXICODONE) 5 MG Tab; Take 1 Tab by mouth every four hours as needed for Severe Pain for up to 14 days.  -     gabapentin (NEURONTIN) 300 MG Cap; Take 1-2 Caps by mouth 3 times a day as needed (pain).  -     Naloxone (NARCAN) 4 MG/0.1ML Liquid; Spray 4 mg in nose as needed (For severe sleepiness or difficulty breathing from possible overdose. Call 911 after administration.).  -     oxyCODONE immediate-release (ROXICODONE) 5 MG Tab; Take 1 Tab by mouth every four hours as needed for Severe Pain for up to 14 days.  -     Consent for Opiate Prescription  -     Controlled Substance Treatment Agreement    History of lumbosacral spine surgery L4-5 fusion done by Dr Barrie Espinoza  -     oxyCODONE  immediate-release (ROXICODONE) 5 MG Tab; Take 1 Tab by mouth every four hours as needed for Severe Pain for up to 14 days.  -     gabapentin (NEURONTIN) 300 MG Cap; Take 1-2 Caps by mouth 3 times a day as needed (pain).  -     Naloxone (NARCAN) 4 MG/0.1ML Liquid; Spray 4 mg in nose as needed (For severe sleepiness or difficulty breathing from possible overdose. Call 911 after administration.).  -     oxyCODONE immediate-release (ROXICODONE) 5 MG Tab; Take 1 Tab by mouth every four hours as needed for Severe Pain for up to 14 days.  -     Consent for Opiate Prescription  -     Controlled Substance Treatment Agreement    Myalgia  -     oxyCODONE immediate-release (ROXICODONE) 5 MG Tab; Take 1 Tab by mouth every four hours as needed for Severe Pain for up to 14 days.  -     gabapentin (NEURONTIN) 300 MG Cap; Take 1-2 Caps by mouth 3 times a day as needed (pain).  -     Naloxone (NARCAN) 4 MG/0.1ML Liquid; Spray 4 mg in nose as needed (For severe sleepiness or difficulty breathing from possible overdose. Call 911 after administration.).  -     oxyCODONE immediate-release (ROXICODONE) 5 MG Tab; Take 1 Tab by mouth every four hours as needed for Severe Pain for up to 14 days.  -     dexamethasone (DECADRON) injection 8 mg  -     Consent for Opiate Prescription  -     Controlled Substance Treatment Agreement          I believe the patient's pain is mostly secondary to his lumbar radiculopathy and spinal stenosis however he has trigger point injections with reproduces pain.    I also discussed the case with Dr. Yamil daley.    Physical therapy: The patient has been to physical therapy in the past, I do not believe he would tolerate physical therapy at this point given the severity of his pain and lack of mobility as well as neurotension signs positive.      Diagnostic workup: Procedure below for diagnostic and therapeutic purposes    Medications: As above    The pain medications above were written for the patient's acute  pain  Last opioid risk scale: 10/1/2020   Last urine drug screen: 10/1/2020   Last controlled substance agreement: 10/1/2020    reviewed: 10/1/2020   Naloxone prescribed: yes. Discussed when and how to use the antidote upon prescription.        Opioid Risk Score: 0    Interpretation of Opioid Risk Score   Score 0-3 = Low risk of abuse. Do UDS at least once per year.  Score 4-7 = Moderate risk of abuse. Do UDS 1-4 times per year.  Score 8+ = High risk of abuse. Refer to specialist.     I reviewed the     In prescribing controlled substances to this patient, I certify that I have obtained and reviewed the medical history of Zachery Esquivel. I have also made a good alexandre effort to obtain applicable records from other providers who have treated the patient and records did not demonstrate any increased risk of substance abuse that would prevent me from prescribing controlled substances.     I have conducted a physical exam and documented it. I have reviewed Mr. Esquivel’s prescription history as maintained by the [place] Prescription Monitoring Program.     I have assessed the patient’s risk for abuse, dependency, and addiction using the validated Opioid Risk Tool available at https://www.mdcalc.com/simndq-cbia-hlnp-ort-narcotic-abuse.     Given the above, I believe the benefits of controlled substance therapy outweigh the risks. The reasons for prescribing controlled substances include non-narcotic, oral analgesic alternatives have been inadequate for pain control. Accordingly, I have discussed the risk and benefits, treatment plan, and alternative therapies with the patient.               Interventional program:   Given the severity of the patient's pain we decided to proceed with trigger point injections at today's visit.  Systemic procedure note for more management of this.    I have also ordered bilateral L3-4 transforaminal epidural steroid injection    The risks benefits and alternatives to this  procedure were discussed and the patient wishes to proceed with the procedure. Risks include but are not limited to damage to surrounding structures, infection, bleeding, worsening of pain which can be permanent, weakness which can be permanent. Benefits include pain relief, improved function. Alternatives includes not doing the procedure.        Outside records requested:  The patient signed outside records request form for his outside records including outside images. This includes the records from Dr Barrie Espinoza in [place].       Follow-up: After the above diagnostic studies           Please note that this dictation was created using voice recognition software. I have made every reasonable attempt to correct obvious errors but there may be errors of grammar and content that I may have overlooked prior to finalization of this note.      Jack Larson MD  Physical Medicine and Rehabilitation  Interventional Spine and Sports Physiatry  [facility]           Yamil Quinteros M.D.   LISA Benz P.A.-C.

## 2020-10-01 NOTE — Clinical Note
Dear Salima Benz P.A.-C. ,     Thank you for the referral of Zachery Esquivel.      Please see my note for more details       Should you have any questions or concerns please do not hesitate to contact me.    Jack Larson M.D.

## 2020-10-01 NOTE — H&P (VIEW-ONLY)
New patient note    Physiatry (physical medicine and  Rehabilitation), interventional spine and sports medicine    Date of service: See epic    Chief complaint:   Chief Complaint   Patient presents with   • New Patient     Back pain        Referring provider: Yamil Lui M.D.    HISTORY    HPI: Zachery Esquivel 54 y.o.  who presents today with Diagnoses of Lumbar radiculopathy, Spinal stenosis of lumbar region, unspecified whether neurogenic claudication present, History of lumbosacral spine surgery L4-5 fusion done by Dr Barrie Espinoza, and Myalgia were pertinent to this visit.    Back Pain  Chronicity: acute on chronic. Episode onset: worse over the past week. The problem occurs constantly. The problem has been gradually worsening since onset. The pain is present in the gluteal, lumbar spine and sacro-iliac. Radiates to: bilateral anterior thigh. The pain is at a severity of 9/10. The symptoms are aggravated by bending, position, standing and twisting. Associated symptoms include leg pain, numbness and tingling. Pertinent negatives include no paresis or weakness. He has tried analgesics, heat, home exercises, ice, muscle relaxant, NSAIDs and walking for the symptoms. The treatment provided no relief.     Medications tried oxycodone, Zanaflex, Flexeril, Medrol Dosepak, diclofenac, Robaxin    Had elevated LFT on tylenol in the past.      surgery previously done by Dr Barrie Espinoza in [place].     Medical records review:  I reviewed the note from the referring provider Yamil Lui M.D. including the note dated 10/1/2020.  He knows the patient's previous surgery and likely radiculopathy above the level of the fusion.          ROS:   Red Flags ROS:   Fever, Chills, Sweats: Denies  Involuntary Weight Loss: Denies  Bladder Incontinence: Denies  Bowel Incontinence: denies  Saddle Anesthesia: Denies    All other systems reviewed and negative.       PMHx:   Past Medical History:   Diagnosis Date   • Low back pain    •  S/P laparoscopic fundoplication          Current Outpatient Medications on File Prior to Visit   Medication Sig Dispense Refill   • oxycodone 5 MG capsule Take 5-30 mg by mouth every four hours as needed.     • diclofenac DR (VOLTAREN) 75 MG Tablet Delayed Response Take 1 Tab by mouth 2 Times a Day. 60 Tab 0   • methocarbamol (ROBAXIN) 500 MG Tab Take 1 Tab by mouth 4 times a day. 120 Tab 0     No current facility-administered medications on file prior to visit.         PSHx:   Past Surgical History:   Procedure Laterality Date   • ABDOMINAL EXPLORATION     • APPENDECTOMY     • CHOLECYSTECTOMY     • LUMBAR LAMINECTOMY DISKECTOMY      l4-5 with fusion   • NISSEN FUNDOPLICATION LAPAROSCOPIC         Family history   Family History   Problem Relation Age of Onset   • Diabetes Father    • Diabetes Paternal Grandmother    • Cancer Sister 35        breast   • Heart Disease Neg Hx    • Stroke Neg Hx    • Hypertension Neg Hx    • Hyperlipidemia Neg Hx          Medications: reviewed on epic.   Outpatient Medications Marked as Taking for the 10/1/20 encounter (Office Visit) with Jack Larson M.D.   Medication Sig Dispense Refill   • oxycodone 5 MG capsule Take 5-30 mg by mouth every four hours as needed.     • oxyCODONE immediate-release (ROXICODONE) 5 MG Tab Take 1 Tab by mouth every four hours as needed for Severe Pain for up to 14 days. 84 Tab 0   • gabapentin (NEURONTIN) 300 MG Cap Take 1-2 Caps by mouth 3 times a day as needed (pain). 180 Cap 3   • Naloxone (NARCAN) 4 MG/0.1ML Liquid Spray 4 mg in nose as needed (For severe sleepiness or difficulty breathing from possible overdose. Call 911 after administration.). 1 Each 0   • [START ON 10/15/2020] oxyCODONE immediate-release (ROXICODONE) 5 MG Tab Take 1 Tab by mouth every four hours as needed for Severe Pain for up to 14 days. 84 Tab 0   • diclofenac DR (VOLTAREN) 75 MG Tablet Delayed Response Take 1 Tab by mouth 2 Times a Day. 60 Tab 0   • methocarbamol (ROBAXIN)  500 MG Tab Take 1 Tab by mouth 4 times a day. 120 Tab 0        Allergies:   Allergies   Allergen Reactions   • Penicillins Rash     .   • Tylenol      Can't take due to elevated liver enzymes   • Bactrim [Sulfamethoxazole W-Trimethoprim]      Itching rash palms and soles       Social Hx:   Social History     Socioeconomic History   • Marital status:      Spouse name: Not on file   • Number of children: Not on file   • Years of education: Not on file   • Highest education level: Not on file   Occupational History   • Not on file   Social Needs   • Financial resource strain: Not on file   • Food insecurity     Worry: Not on file     Inability: Not on file   • Transportation needs     Medical: Not on file     Non-medical: Not on file   Tobacco Use   • Smoking status: Never Smoker   • Smokeless tobacco: Never Used   Substance and Sexual Activity   • Alcohol use: Yes     Alcohol/week: 1.0 oz     Types: 2 Glasses of wine per week     Comment: Occasionally   • Drug use: No   • Sexual activity: Yes     Partners: Female   Lifestyle   • Physical activity     Days per week: Not on file     Minutes per session: Not on file   • Stress: Not on file   Relationships   • Social connections     Talks on phone: Not on file     Gets together: Not on file     Attends [employer] service: Not on file     Active member of club or organization: Not on file     Attends meetings of clubs or organizations: Not on file     Relationship status: Not on file   • Intimate partner violence     Fear of current or ex partner: Not on file     Emotionally abused: Not on file     Physically abused: Not on file     Forced sexual activity: Not on file   Other Topics Concern   •  Service No   • Blood Transfusions Yes   • Caffeine Concern No   • Occupational Exposure No   • Hobby Hazards No   • Sleep Concern Yes   • Stress Concern Yes   • Weight Concern No   • Special Diet No   • Back Care Yes     Comment: back brace   • Exercise Yes   • Bike  Helmet No   • Seat Belt Yes   • Self-Exams No   Social History Narrative   • Not on file         EXAMINATION     Physical Exam:   Vitals: /80 (BP Location: Right arm, Patient Position: Sitting, BP Cuff Size: Adult)   Pulse 72   Temp 36.4 °C (97.5 °F) (Temporal)   Ht 1.829 m (6')   Wt 102.1 kg (225 lb)   SpO2 92%     Constitutional:   Body Habitus: Body mass index is 30.52 kg/m².  Cooperation: Fully cooperates with exam  Appearance: Well-groomed, well-nourished, not disheveled     Eyes: No scleral icterus to suggest severe liver disease, no proptosis to suggest severe hyperthyroid    ENT -no obvious auditory deficits, no obvious tongue lesions, tongue midline, no facial droop     Skin -no rashes or lesions noted     Respiratory-  breathing comfortable on room air, no audible wheezing    Cardiovascular- capillary refills less than 2 seconds. No lower extremity edema is noted.     Gastrointestinal - no obvious abdominal masses, No tenderness to palpation in the abdomen    Psychiatric- alert and oriented ×3. Normal affect.     Gait - normal gait, no use of ambulatory device, nonantalgic.     Musculoskeletal and Neuro -         Thoracic/Lumbar Spine/Sacral Spine/Hips   Inspection: No evidence of atrophy in bilateral lower extremities throughout     ROM: decreased active range of motion with flexion, lateral flexion, and rotation bilaterally.   There is decreased active range of motion with lumbar extension with pain.    There is pain with facet loading maneuver (extension rotation) with axial low back pain on the BILATERAL side(s)    Palpation:   No tenderness to palpation in midline at T1-T12 levels. No tenderness to palpation in the left and right of the midline T1-L5, NEGATIVE for tenderness to palpation to the para-midline region in the lower lumbar levels.  palpation over SI joint: negative bilaterally    palpation in hip or over the gluteus medius tendon insertion: negative bilaterally positive for  multiple trigger points palpated which reproduce the patient's pain.    Lumbar spine Special tests  Neuro tension  Straight leg test positive bilaterally    Slump test positive bilaterally    Femoral stretch test is positive bilaterally      HIP  FAIR test negative bilaterally    Range of motion in the RIGHT hip is full  in flexion, extension, abduction, internal rotation, external rotation.  Range of motion in the LEFT hip is full  in flexion, extension, abduction, internal rotation, external rotation.      SI joint tests  Observation patient sits on one buttocks: Negative  SI joint compression negative bilaterally    SI joint distraction negative bilaterally    Thigh thrust test negative bilaterally    RAJNI test negative bilaterally                 Key points for the international standards for neurological classification of spinal cord injury (ISNCSCI) to light touch.     Dermatome R L                                      L2 2 2   L3 2 2   L4 2 2   L5 2 2   S1 2 2   S2 2 2       Motor Exam Lower Extremities    ? Myotome R L   Hip flexion L2 5 5   Knee extension L3 5 5   Ankle dorsiflexion L4 5 5   Toe extension L5 5 5   Ankle plantarflexion S1 5 5         Reflexes  ?  R L                Patella  2+ 2+   Achilles   2+ 2+       Babinski sign negative bilaterally   Clonus of the ankle negative bilaterally       MEDICAL DECISION MAKING    Medical records review: see under HPI section.     DATA    Labs:   Lab Results   Component Value Date/Time    SODIUM 139 09/29/2020 05:40 AM    POTASSIUM 4.3 09/29/2020 05:40 AM    CHLORIDE 106 09/29/2020 05:40 AM    CO2 19 (L) 09/29/2020 05:40 AM    ANION 14.0 09/29/2020 05:40 AM    GLUCOSE 154 (H) 09/29/2020 05:40 AM    BUN 20 09/29/2020 05:40 AM    CREATININE 0.74 09/29/2020 05:40 AM    CALCIUM 8.9 09/29/2020 05:40 AM    ASTSGOT 44 09/29/2020 05:40 AM    ALTSGPT 267 (H) 09/29/2020 05:40 AM    TBILIRUBIN 0.6 09/29/2020 05:40 AM    ALBUMIN 3.9 09/29/2020 05:40 AM    TOTPROTEIN 6.1  09/29/2020 05:40 AM    GLOBULIN 2.2 09/29/2020 05:40 AM    AGRATIO 1.8 09/29/2020 05:40 AM   ]    No results found for: PROTHROMBTM, INR     Lab Results   Component Value Date/Time    WBC 13.5 (H) 09/29/2020 05:40 AM    RBC 4.91 09/29/2020 05:40 AM    HEMOGLOBIN 14.6 09/29/2020 05:40 AM    HEMATOCRIT 44.6 09/29/2020 05:40 AM    MCV 90.8 09/29/2020 05:40 AM    MCH 29.7 09/29/2020 05:40 AM    MCHC 32.7 (L) 09/29/2020 05:40 AM    MPV 10.5 09/29/2020 05:40 AM    NEUTSPOLYS 86.20 (H) 09/29/2020 05:40 AM    LYMPHOCYTES 6.10 (L) 09/29/2020 05:40 AM    MONOCYTES 7.00 09/29/2020 05:40 AM    EOSINOPHILS 0.00 09/29/2020 05:40 AM    BASOPHILS 0.10 09/29/2020 05:40 AM        Lab Results   Component Value Date/Time    HBA1C 5.6% 06/25/2015 02:00 PM        Imaging:   I personally reviewed following images, these are my reads  MRI lumbar spine 9/27/2020  At L5-S1 there is a disc protrusion.  This is causing mild to moderate central canal stenosis and moderate bilateral stenosis right worse than left.  History of L4-5 posterior fusion.  At L3-4 there is moderate to severe central canal stenosis and bilateral neuroforaminal stenosis.  Facet arthropathy at L3-4 and L5-S1.  There are no acute fractures.      IMAGING radiology reads. I reviewed the following radiology reads                                                     Results for orders placed during the hospital encounter of 09/27/20   MR-LUMBAR SPINE-W/O    Impression 1.  L3-4 mild-moderate spinal stenosis    2.  L5-S1 left paracentral disc extrusion moderately narrowing the left lateral recess and foramen    3.  L4-5 postoperative changes appear within normal limits                                            CT L spine 9/26/2020         IMPRESSION:     1.  Bilateral L4 and L5 pedicle screw and left L5 laminectomy. Within normal limits     2.  Facet arthropathy                                                                                                   Diagnosis   Visit  Diagnoses     ICD-10-CM   1. Lumbar radiculopathy  M54.16   2. Spinal stenosis of lumbar region, unspecified whether neurogenic claudication present  M48.061   3. History of lumbosacral spine surgery L4-5 fusion done by Dr Barrie Espinoza  Z98.890   4. Myalgia  M79.10           ASSESSMENT AND PLAN:  Zachery Esquivel 54 y.o. male      Zachery was seen today for new patient.    Diagnoses and all orders for this visit:    Lumbar radiculopathy  -     oxyCODONE immediate-release (ROXICODONE) 5 MG Tab; Take 1 Tab by mouth every four hours as needed for Severe Pain for up to 14 days.  -     gabapentin (NEURONTIN) 300 MG Cap; Take 1-2 Caps by mouth 3 times a day as needed (pain).  -     Naloxone (NARCAN) 4 MG/0.1ML Liquid; Spray 4 mg in nose as needed (For severe sleepiness or difficulty breathing from possible overdose. Call 911 after administration.).  -     oxyCODONE immediate-release (ROXICODONE) 5 MG Tab; Take 1 Tab by mouth every four hours as needed for Severe Pain for up to 14 days.  -     Consent for Opiate Prescription  -     Controlled Substance Treatment Agreement  -     REFERRAL TO PHYSICAL MEDICINE REHAB    Spinal stenosis of lumbar region, unspecified whether neurogenic claudication present  -     oxyCODONE immediate-release (ROXICODONE) 5 MG Tab; Take 1 Tab by mouth every four hours as needed for Severe Pain for up to 14 days.  -     gabapentin (NEURONTIN) 300 MG Cap; Take 1-2 Caps by mouth 3 times a day as needed (pain).  -     Naloxone (NARCAN) 4 MG/0.1ML Liquid; Spray 4 mg in nose as needed (For severe sleepiness or difficulty breathing from possible overdose. Call 911 after administration.).  -     oxyCODONE immediate-release (ROXICODONE) 5 MG Tab; Take 1 Tab by mouth every four hours as needed for Severe Pain for up to 14 days.  -     Consent for Opiate Prescription  -     Controlled Substance Treatment Agreement    History of lumbosacral spine surgery L4-5 fusion done by Dr Barrie Espinoza  -     oxyCODONE  immediate-release (ROXICODONE) 5 MG Tab; Take 1 Tab by mouth every four hours as needed for Severe Pain for up to 14 days.  -     gabapentin (NEURONTIN) 300 MG Cap; Take 1-2 Caps by mouth 3 times a day as needed (pain).  -     Naloxone (NARCAN) 4 MG/0.1ML Liquid; Spray 4 mg in nose as needed (For severe sleepiness or difficulty breathing from possible overdose. Call 911 after administration.).  -     oxyCODONE immediate-release (ROXICODONE) 5 MG Tab; Take 1 Tab by mouth every four hours as needed for Severe Pain for up to 14 days.  -     Consent for Opiate Prescription  -     Controlled Substance Treatment Agreement    Myalgia  -     oxyCODONE immediate-release (ROXICODONE) 5 MG Tab; Take 1 Tab by mouth every four hours as needed for Severe Pain for up to 14 days.  -     gabapentin (NEURONTIN) 300 MG Cap; Take 1-2 Caps by mouth 3 times a day as needed (pain).  -     Naloxone (NARCAN) 4 MG/0.1ML Liquid; Spray 4 mg in nose as needed (For severe sleepiness or difficulty breathing from possible overdose. Call 911 after administration.).  -     oxyCODONE immediate-release (ROXICODONE) 5 MG Tab; Take 1 Tab by mouth every four hours as needed for Severe Pain for up to 14 days.  -     dexamethasone (DECADRON) injection 8 mg  -     Consent for Opiate Prescription  -     Controlled Substance Treatment Agreement          I believe the patient's pain is mostly secondary to his lumbar radiculopathy and spinal stenosis however he has trigger point injections with reproduces pain.    I also discussed the case with Dr. Yamil daley.    Physical therapy: The patient has been to physical therapy in the past, I do not believe he would tolerate physical therapy at this point given the severity of his pain and lack of mobility as well as neurotension signs positive.      Diagnostic workup: Procedure below for diagnostic and therapeutic purposes    Medications: As above    The pain medications above were written for the patient's acute  pain  Last opioid risk scale: 10/1/2020   Last urine drug screen: 10/1/2020   Last controlled substance agreement: 10/1/2020    reviewed: 10/1/2020   Naloxone prescribed: yes. Discussed when and how to use the antidote upon prescription.        Opioid Risk Score: 0    Interpretation of Opioid Risk Score   Score 0-3 = Low risk of abuse. Do UDS at least once per year.  Score 4-7 = Moderate risk of abuse. Do UDS 1-4 times per year.  Score 8+ = High risk of abuse. Refer to specialist.     I reviewed the     In prescribing controlled substances to this patient, I certify that I have obtained and reviewed the medical history of Zachery Esquivel. I have also made a good alexandre effort to obtain applicable records from other providers who have treated the patient and records did not demonstrate any increased risk of substance abuse that would prevent me from prescribing controlled substances.     I have conducted a physical exam and documented it. I have reviewed Mr. Esquivel’s prescription history as maintained by the [place] Prescription Monitoring Program.     I have assessed the patient’s risk for abuse, dependency, and addiction using the validated Opioid Risk Tool available at https://www.mdcalc.com/ebfasn-kwch-aukd-ort-narcotic-abuse.     Given the above, I believe the benefits of controlled substance therapy outweigh the risks. The reasons for prescribing controlled substances include non-narcotic, oral analgesic alternatives have been inadequate for pain control. Accordingly, I have discussed the risk and benefits, treatment plan, and alternative therapies with the patient.               Interventional program:   Given the severity of the patient's pain we decided to proceed with trigger point injections at today's visit.  Systemic procedure note for more management of this.    I have also ordered bilateral L3-4 transforaminal epidural steroid injection    The risks benefits and alternatives to this  procedure were discussed and the patient wishes to proceed with the procedure. Risks include but are not limited to damage to surrounding structures, infection, bleeding, worsening of pain which can be permanent, weakness which can be permanent. Benefits include pain relief, improved function. Alternatives includes not doing the procedure.        Outside records requested:  The patient signed outside records request form for his outside records including outside images. This includes the records from Dr Barrie Espinoza in [place].       Follow-up: After the above diagnostic studies           Please note that this dictation was created using voice recognition software. I have made every reasonable attempt to correct obvious errors but there may be errors of grammar and content that I may have overlooked prior to finalization of this note.      Jack Larson MD  Physical Medicine and Rehabilitation  Interventional Spine and Sports Physiatry  [facility]           Yamil Quinteros M.D.   LISA Benz P.A.-C.

## 2020-10-02 NOTE — PROCEDURES
Date of Service: 10/1/2020    Physician/s: Jack Larson MD    Pre-operative Diagnosis: Myalgia (M79.1)    Post-operative Diagnosis: Myalgia (M79.1)    Procedure: Right upper lumbar paraspinous muscle, right lower lumbar paraspinous muscle, right quadratus lumborum muscle, left upper lumbar paraspinous muscle, left lower lumbar paraspinous muscle, left quadratus lumborum muscle trigger point injections    Description of procedure:    The risks, benefits, and alternatives of the procedure were reviewed and discussed with the patient.  Written informed consent was freely obtained. A pre-procedural time-out was conducted by the physician verifying patient’s identity, procedure to be performed, procedure site and side, and allergy verification. Appropriate equipment was determined to be in place for the procedure.     In the office suite exam room the patient was placed in a prone position and the skin areas for injection over the Right upper lumbar paraspinous muscle, right lower lumbar paraspinous muscle, right quadratus lumborum muscle, left upper lumbar paraspinous muscle, left lower lumbar paraspinous muscle, left quadratus lumborum muscle was marked. A solution with 5 mL of sterile saline, 3mL of 1% lidocaine and 2 mL of 4 mg/mL of dexamethasone  was prepared.  The areas of pain was then prepped and draped in the usual sterile fashion. A 27g needle was placed into each of the markings at the areas above.. After negative aspiration, 1.5 milliliters of the above solution was injected into the each areas above. The needle was removed intact after each trigger point injection, and the patient's back was covered with a 4x4 gauze, the area was cleansed with sterile normal saline, and a dressing was applied. There were no complications noted.     Preprocedural pain: 10/10  Postprocedural pain: 6/10    Jack Larson MD  Physical Medicine and Rehabilitation  Interventional Spine and Sports Physiatry  [facility]

## 2020-10-05 ENCOUNTER — PATIENT MESSAGE (OUTPATIENT)
Dept: MEDICAL GROUP | Age: 55
End: 2020-10-05

## 2020-10-05 ENCOUNTER — TELEPHONE (OUTPATIENT)
Dept: PHYSICAL MEDICINE AND REHAB | Facility: MEDICAL CENTER | Age: 55
End: 2020-10-05

## 2020-10-05 RX ORDER — ONDANSETRON 4 MG/1
4-8 TABLET, FILM COATED ORAL EVERY 8 HOURS PRN
Qty: 15 TAB | Refills: 0 | Status: SHIPPED | OUTPATIENT
Start: 2020-10-05 | End: 2020-10-10

## 2020-10-05 NOTE — TELEPHONE ENCOUNTER
From: Zachery Astudillo Alvin  To: CELSO Veliz  Sent: 10/5/2020 1:13 PM PDT  Subject: Non-Urgent Medical Question    Thank you. Are you calling it into [facility]? He had a Cr scan last week at ER to evaluate for kidney stones and it was negative.       ----- Message -----   From:CELSO Veliz   Sent:10/5/2020 1:10 PM PDT   To:Zachery Esquivel   Subject:RE: Non-Urgent Medical Question    He can try zofran and I will send an Rx to the pharmacy.   If this doesn't help we should evaluate him to be sure there isn't an underlying cause for the nausea. For instance, kidney stones can cause back pain and nausea. Also, theh oxycodone itself can cause nausea. Keep us posted..    KING Albert-CARLYLE  Family Medicine   Covering for Salima Benz P.A.-C.       ----- Message -----   From:Zachery Bassettmarlene   Sent:10/5/2020 9:16 AM PDT   To:Salima Benz P.A.-C.   Subject:Non-Urgent Medical Question    My  has been in hospital and ER for back pain for last week. Now he picked up stomach flu and has severe nausea. He cannot throw up due to his fundoplication. I was giving him promethazine 50 mg for nausea but he still has nausea. Is there another anti nausea med he can try instead?

## 2020-10-05 NOTE — TELEPHONE ENCOUNTER
Pts wife called   She said he caught the stomach flu severe nausea, had fundoplication and has dry heaves.  He stop taking medication. His last dose was Saturday morning.

## 2020-10-06 ENCOUNTER — TELEPHONE (OUTPATIENT)
Dept: PHYSICAL MEDICINE AND REHAB | Facility: MEDICAL CENTER | Age: 55
End: 2020-10-06

## 2020-10-08 ENCOUNTER — HOSPITAL ENCOUNTER (EMERGENCY)
Facility: MEDICAL CENTER | Age: 55
End: 2020-10-08
Attending: EMERGENCY MEDICINE
Payer: COMMERCIAL

## 2020-10-08 VITALS
HEART RATE: 67 BPM | DIASTOLIC BLOOD PRESSURE: 70 MMHG | WEIGHT: 218.03 LBS | RESPIRATION RATE: 17 BRPM | HEIGHT: 72 IN | BODY MASS INDEX: 29.53 KG/M2 | TEMPERATURE: 98.1 F | SYSTOLIC BLOOD PRESSURE: 111 MMHG | OXYGEN SATURATION: 93 %

## 2020-10-08 DIAGNOSIS — E86.0 DEHYDRATION: ICD-10-CM

## 2020-10-08 DIAGNOSIS — R11.0 NAUSEA: ICD-10-CM

## 2020-10-08 LAB
ALBUMIN SERPL BCP-MCNC: 4.4 G/DL (ref 3.2–4.9)
ALBUMIN/GLOB SERPL: 1.4 G/DL
ALP SERPL-CCNC: 85 U/L (ref 30–99)
ALT SERPL-CCNC: 108 U/L (ref 2–50)
ANION GAP SERPL CALC-SCNC: 14 MMOL/L (ref 7–16)
AST SERPL-CCNC: 21 U/L (ref 12–45)
BASOPHILS # BLD AUTO: 0.3 % (ref 0–1.8)
BASOPHILS # BLD: 0.04 K/UL (ref 0–0.12)
BILIRUB SERPL-MCNC: 1.8 MG/DL (ref 0.1–1.5)
BUN SERPL-MCNC: 24 MG/DL (ref 8–22)
CALCIUM SERPL-MCNC: 9.4 MG/DL (ref 8.4–10.2)
CHLORIDE SERPL-SCNC: 103 MMOL/L (ref 96–112)
CO2 SERPL-SCNC: 25 MMOL/L (ref 20–33)
COVID ORDER STATUS COVID19: NORMAL
CREAT SERPL-MCNC: 1.11 MG/DL (ref 0.5–1.4)
EOSINOPHIL # BLD AUTO: 0.05 K/UL (ref 0–0.51)
EOSINOPHIL NFR BLD: 0.4 % (ref 0–6.9)
ERYTHROCYTE [DISTWIDTH] IN BLOOD BY AUTOMATED COUNT: 37.9 FL (ref 35.9–50)
GLOBULIN SER CALC-MCNC: 3.1 G/DL (ref 1.9–3.5)
GLUCOSE SERPL-MCNC: 130 MG/DL (ref 65–99)
HCT VFR BLD AUTO: 51 % (ref 42–52)
HGB BLD-MCNC: 17.6 G/DL (ref 14–18)
IMM GRANULOCYTES # BLD AUTO: 0.07 K/UL (ref 0–0.11)
IMM GRANULOCYTES NFR BLD AUTO: 0.5 % (ref 0–0.9)
LACTATE BLD-SCNC: 1.6 MMOL/L (ref 0.5–2)
LIPASE SERPL-CCNC: 27 U/L (ref 7–58)
LYMPHOCYTES # BLD AUTO: 1.54 K/UL (ref 1–4.8)
LYMPHOCYTES NFR BLD: 10.9 % (ref 22–41)
MCH RBC QN AUTO: 29.7 PG (ref 27–33)
MCHC RBC AUTO-ENTMCNC: 34.4 G/DL (ref 33.7–35.3)
MCV RBC AUTO: 86.6 FL (ref 81.4–97.8)
MONOCYTES # BLD AUTO: 0.99 K/UL (ref 0–0.85)
MONOCYTES NFR BLD AUTO: 7 % (ref 0–13.4)
NEUTROPHILS # BLD AUTO: 11.45 K/UL (ref 1.82–7.42)
NEUTROPHILS NFR BLD: 80.9 % (ref 44–72)
NRBC # BLD AUTO: 0 K/UL
NRBC BLD-RTO: 0 /100 WBC
PLATELET # BLD AUTO: 197 K/UL (ref 164–446)
PMV BLD AUTO: 9.7 FL (ref 9–12.9)
POTASSIUM SERPL-SCNC: 4.1 MMOL/L (ref 3.6–5.5)
PROT SERPL-MCNC: 7.5 G/DL (ref 6–8.2)
RBC # BLD AUTO: 5.95 M/UL (ref 4.7–6.1)
SARS-COV-2 RNA RESP QL NAA+PROBE: NOTDETECTED
SODIUM SERPL-SCNC: 142 MMOL/L (ref 135–145)
SPECIMEN SOURCE: NORMAL
WBC # BLD AUTO: 14.1 K/UL (ref 4.8–10.8)

## 2020-10-08 PROCEDURE — 700105 HCHG RX REV CODE 258: Performed by: EMERGENCY MEDICINE

## 2020-10-08 PROCEDURE — 700102 HCHG RX REV CODE 250 W/ 637 OVERRIDE(OP): Performed by: EMERGENCY MEDICINE

## 2020-10-08 PROCEDURE — A9270 NON-COVERED ITEM OR SERVICE: HCPCS | Performed by: EMERGENCY MEDICINE

## 2020-10-08 PROCEDURE — 36415 COLL VENOUS BLD VENIPUNCTURE: CPT

## 2020-10-08 PROCEDURE — U0003 INFECTIOUS AGENT DETECTION BY NUCLEIC ACID (DNA OR RNA); SEVERE ACUTE RESPIRATORY SYNDROME CORONAVIRUS 2 (SARS-COV-2) (CORONAVIRUS DISEASE [COVID-19]), AMPLIFIED PROBE TECHNIQUE, MAKING USE OF HIGH THROUGHPUT TECHNOLOGIES AS DESCRIBED BY CMS-2020-01-R: HCPCS

## 2020-10-08 PROCEDURE — 80053 COMPREHEN METABOLIC PANEL: CPT

## 2020-10-08 PROCEDURE — 85025 COMPLETE CBC W/AUTO DIFF WBC: CPT

## 2020-10-08 PROCEDURE — 96374 THER/PROPH/DIAG INJ IV PUSH: CPT

## 2020-10-08 PROCEDURE — 83690 ASSAY OF LIPASE: CPT

## 2020-10-08 PROCEDURE — 83605 ASSAY OF LACTIC ACID: CPT

## 2020-10-08 PROCEDURE — C9803 HOPD COVID-19 SPEC COLLECT: HCPCS | Performed by: EMERGENCY MEDICINE

## 2020-10-08 PROCEDURE — 99284 EMERGENCY DEPT VISIT MOD MDM: CPT

## 2020-10-08 PROCEDURE — 700111 HCHG RX REV CODE 636 W/ 250 OVERRIDE (IP): Performed by: EMERGENCY MEDICINE

## 2020-10-08 RX ORDER — PROMETHAZINE HYDROCHLORIDE 25 MG/1
25 SUPPOSITORY RECTAL ONCE
Status: COMPLETED | OUTPATIENT
Start: 2020-10-08 | End: 2020-10-08

## 2020-10-08 RX ORDER — FLUTICASONE PROPIONATE 50 MCG
1 SPRAY, SUSPENSION (ML) NASAL
COMMUNITY
End: 2020-11-09

## 2020-10-08 RX ORDER — SODIUM CHLORIDE, SODIUM LACTATE, POTASSIUM CHLORIDE, CALCIUM CHLORIDE 600; 310; 30; 20 MG/100ML; MG/100ML; MG/100ML; MG/100ML
1000 INJECTION, SOLUTION INTRAVENOUS ONCE
Status: COMPLETED | OUTPATIENT
Start: 2020-10-08 | End: 2020-10-08

## 2020-10-08 RX ORDER — PROMETHAZINE HYDROCHLORIDE 25 MG/1
25 SUPPOSITORY RECTAL EVERY 8 HOURS PRN
Qty: 15 SUPPOSITORY | Refills: 0 | Status: SHIPPED | OUTPATIENT
Start: 2020-10-08 | End: 2020-11-09

## 2020-10-08 RX ORDER — ONDANSETRON 2 MG/ML
4 INJECTION INTRAMUSCULAR; INTRAVENOUS ONCE
Status: COMPLETED | OUTPATIENT
Start: 2020-10-08 | End: 2020-10-08

## 2020-10-08 RX ADMIN — ONDANSETRON 4 MG: 2 INJECTION INTRAMUSCULAR; INTRAVENOUS at 17:23

## 2020-10-08 RX ADMIN — SODIUM CHLORIDE, POTASSIUM CHLORIDE, SODIUM LACTATE AND CALCIUM CHLORIDE 1000 ML: 600; 310; 30; 20 INJECTION, SOLUTION INTRAVENOUS at 17:23

## 2020-10-08 RX ADMIN — PROMETHAZINE HYDROCHLORIDE 25 MG: 25 SUPPOSITORY RECTAL at 20:40

## 2020-10-08 RX ADMIN — SODIUM CHLORIDE, POTASSIUM CHLORIDE, SODIUM LACTATE AND CALCIUM CHLORIDE 1000 ML: 600; 310; 30; 20 INJECTION, SOLUTION INTRAVENOUS at 20:40

## 2020-10-08 ASSESSMENT — FIBROSIS 4 INDEX: FIB4 SCORE: 0.8

## 2020-10-08 NOTE — ED TRIAGE NOTES
Pt to er with c/o loss of appetite, nausea and dehydration x 8 days and has been unable to take pain meds since Saturday. Had trigger point injections 1 week ago with dr diaz and is scheduled for epidural on Monday. Denies recent travel or known covid exposure

## 2020-10-08 NOTE — ED NOTES
Pt states that he is not able to provide a UA at this time. Pt encouraged to let staff know as soon is he is able to provide one

## 2020-10-08 NOTE — ED PROVIDER NOTES
ED Provider Note    CHIEF COMPLAINT  Chief Complaint   Patient presents with   • Dehydration     x 8 days   • Loss of Appetite     x 8 days   • Nausea     x 6 days       HPI  Zachery Esquivel is a 54 y.o. male who presents planing of 8 days of nausea diarrhea decreased appetite and change in smell.  Patient has been to the emergency department 3 times prior to this for chronic back issues.  He is scheduled for an epidural on his back on Monday.  He states that for the last 8 days he has had a loss of smell, nausea diarrhea and generalized fatigue.  He feels very dehydrated.  He states he has not eaten any food in 6 days and been only keeping small amounts of fluid down.  He states his urine is very dark.  According to his wife he initially had fevers for the first couple of days but has not had one since.  She is not sick with any symptoms.  The patient has been on steroid and chronic pain medication for his back problems but states he has not been able to keep his medications down.      REVIEW OF SYSTEMS  HEENT:  No ear pain, congestion or sore throat positive loss of smell  EYES: no discharge redness or vision changes  CARDIAC: no chest pain, palpitations    PULMONARY: no dyspnea, cough or congestion   GI: See history of present illness  : no dysuria, back pain or hematuria   Neuro: Generalized weakness, numbness aphasia or headache  Musculoskeletal: no swelling deformity or pain no joint swelling  Endocrine: Positive fevers, no sweating, positive 20 pound weight loss   SKIN: no rash, erythema or contusions     See history of present illness all other systems are negative        PAST MEDICAL HISTORY  Past Medical History:   Diagnosis Date   • Low back pain    • S/P laparoscopic fundoplication        FAMILY HISTORY  Family History   Problem Relation Age of Onset   • Diabetes Father    • Diabetes Paternal Grandmother    • Cancer Sister 35        breast   • Heart Disease Neg Hx    • Stroke Neg Hx    •  Hypertension Neg Hx    • Hyperlipidemia Neg Hx        SOCIAL HISTORY  Social History     Socioeconomic History   • Marital status:      Spouse name: Not on file   • Number of children: Not on file   • Years of education: Not on file   • Highest education level: Not on file   Occupational History   • Not on file   Social Needs   • Financial resource strain: Not on file   • Food insecurity     Worry: Not on file     Inability: Not on file   • Transportation needs     Medical: Not on file     Non-medical: Not on file   Tobacco Use   • Smoking status: Never Smoker   • Smokeless tobacco: Never Used   Substance and Sexual Activity   • Alcohol use: Yes     Alcohol/week: 1.0 oz     Types: 2 Glasses of wine per week     Comment: Occasionally   • Drug use: No   • Sexual activity: Yes     Partners: Female   Lifestyle   • Physical activity     Days per week: Not on file     Minutes per session: Not on file   • Stress: Not on file   Relationships   • Social connections     Talks on phone: Not on file     Gets together: Not on file     Attends Denominational service: Not on file     Active member of club or organization: Not on file     Attends meetings of clubs or organizations: Not on file     Relationship status: Not on file   • Intimate partner violence     Fear of current or ex partner: Not on file     Emotionally abused: Not on file     Physically abused: Not on file     Forced sexual activity: Not on file   Other Topics Concern   •  Service No   • Blood Transfusions Yes   • Caffeine Concern No   • Occupational Exposure No   • Hobby Hazards No   • Sleep Concern Yes   • Stress Concern Yes   • Weight Concern No   • Special Diet No   • Back Care Yes     Comment: back brace   • Exercise Yes   • Bike Helmet No   • Seat Belt Yes   • Self-Exams No   Social History Narrative   • Not on file       SURGICAL HISTORY  Past Surgical History:   Procedure Laterality Date   • ABDOMINAL EXPLORATION     • APPENDECTOMY     •  CHOLECYSTECTOMY     • LUMBAR LAMINECTOMY DISKECTOMY      l4-5 with fusion   • NISSEN FUNDOPLICATION LAPAROSCOPIC         CURRENT MEDICATIONS  Home Medications    **Home medications have not yet been reviewed for this encounter**         ALLERGIES  Allergies   Allergen Reactions   • Penicillins Rash     .   • Tylenol      Can't take due to elevated liver enzymes   • Bactrim [Sulfamethoxazole W-Trimethoprim]      Itching rash palms and soles       PHYSICAL EXAM    VITAL SIGNS: /98   Pulse 70   Temp 36.7 °C (98.1 °F) (Temporal)   Resp 17   Ht 1.829 m (6')   Wt 98.9 kg (218 lb 0.6 oz)   SpO2 97%   BMI 29.57 kg/m²    Constitutional: Well developed, Well nourished, No acute respiratory distress, Non-toxic appearance.  Fatigued  HENT: Normocephalic, Atraumatic, Bilateral external ears normal, Oropharynx clear, mucous membranes are dry.  Eyes: Conjunctiva normal, No discharge. No icterus.  Neck: Normal range of motion. Supple.  Lymphatic: No cervical lymphadenopathy noted.   Cardiovascular: Normal heart rate, Normal rhythm, No murmurs, No rubs, No gallops.   Thorax & Lungs: Clear to auscultation bilaterally, No respiratory distress, No wheezing.  Abdomen: Soft nontender normal bowel sounds no rebound masses or peritoneal signs  Skin: Warm, Dry, No erythema, No rash.   Extremities: Intact distal pulses, No edema, No tenderness  Musculoskeletal: Good range of motion in all major joints. Normal gait.  Neurologic: Alert & oriented x 3. No focal deficits noted.        RADIOLOGY/PROCEDURES  No orders to display         COURSE & MEDICAL DECISION MAKING  Pertinent Labs & Imaging studies reviewed. (See chart for details)  Differential diagnosis: Gastroenteritis, narcotic withdrawal syndrome, dehydration, pancreatitis    Results for orders placed or performed during the hospital encounter of 10/08/20   CBC WITH DIFFERENTIAL   Result Value Ref Range    WBC 14.1 (H) 4.8 - 10.8 K/uL    RBC 5.95 4.70 - 6.10 M/uL     Hemoglobin 17.6 14.0 - 18.0 g/dL    Hematocrit 51.0 42.0 - 52.0 %    MCV 86.6 81.4 - 97.8 fL    MCH 29.7 27.0 - 33.0 pg    MCHC 34.4 33.7 - 35.3 g/dL    RDW 37.9 35.9 - 50.0 fL    Platelet Count 197 164 - 446 K/uL    MPV 9.7 9.0 - 12.9 fL    Neutrophils-Polys 80.90 (H) 44.00 - 72.00 %    Lymphocytes 10.90 (L) 22.00 - 41.00 %    Monocytes 7.00 0.00 - 13.40 %    Eosinophils 0.40 0.00 - 6.90 %    Basophils 0.30 0.00 - 1.80 %    Immature Granulocytes 0.50 0.00 - 0.90 %    Nucleated RBC 0.00 /100 WBC    Neutrophils (Absolute) 11.45 (H) 1.82 - 7.42 K/uL    Lymphs (Absolute) 1.54 1.00 - 4.80 K/uL    Monos (Absolute) 0.99 (H) 0.00 - 0.85 K/uL    Eos (Absolute) 0.05 0.00 - 0.51 K/uL    Baso (Absolute) 0.04 0.00 - 0.12 K/uL    Immature Granulocytes (abs) 0.07 0.00 - 0.11 K/uL    NRBC (Absolute) 0.00 K/uL   Comp Metabolic Panel   Result Value Ref Range    Sodium 142 135 - 145 mmol/L    Potassium 4.1 3.6 - 5.5 mmol/L    Chloride 103 96 - 112 mmol/L    Co2 25 20 - 33 mmol/L    Anion Gap 14.0 7.0 - 16.0    Glucose 130 (H) 65 - 99 mg/dL    Bun 24 (H) 8 - 22 mg/dL    Creatinine 1.11 0.50 - 1.40 mg/dL    Calcium 9.4 8.4 - 10.2 mg/dL    AST(SGOT) 21 12 - 45 U/L    ALT(SGPT) 108 (H) 2 - 50 U/L    Alkaline Phosphatase 85 30 - 99 U/L    Total Bilirubin 1.8 (H) 0.1 - 1.5 mg/dL    Albumin 4.4 3.2 - 4.9 g/dL    Total Protein 7.5 6.0 - 8.2 g/dL    Globulin 3.1 1.9 - 3.5 g/dL    A-G Ratio 1.4 g/dL   LIPASE   Result Value Ref Range    Lipase 27 7 - 58 U/L   COVID/SARS CoV-2 PCR    Specimen: Nasopharyngeal; Respirate   Result Value Ref Range    COVID Order Status Received    LACTIC ACID   Result Value Ref Range    Lactic Acid 1.6 0.5 - 2.0 mmol/L   ESTIMATED GFR   Result Value Ref Range    GFR If African American >60 >60 mL/min/1.73 m 2    GFR If Non African American >60 >60 mL/min/1.73 m 2   SARS-CoV-2, PCR (In-House)   Result Value Ref Range    SARS-CoV-2 Source NP Swab     SARS-CoV-2 by PCR NotDetected         HYDRATION: Based on the  patient's presentation of Acute Vomiting the patient was given IV fluids. IV Hydration was used because oral hydration was not adequate alone. Upon recheck following hydration, the patient was improved.      Patient feels improved after 2 L of lactated Ringer's and Phenergan suppository.  I will discharge him home with more Phenergan and advised him to have a bland diet with increased amount of fluids.  He should return for any fevers worsening date he dehydration or worsening symptoms.      The patient will return for new or worsening symptoms and is stable at the time of discharge.    The patient is referred to a primary physician for blood pressure management, diabetic screening, and for all other preventative health concerns.      DISPOSITION:  Patient will be discharged home in stable condition.    FOLLOW UP:  [address] [place] [id number] [phone]    Call in 2 days  for recheck      OUTPATIENT MEDICATIONS:  New Prescriptions    PROMETHAZINE (PHENERGAN) 25 MG SUPPOS    Insert 1 Suppository in rectum every 8 hours as needed for Nausea/Vomiting for up to 15 doses.         FINAL IMPRESSION  1. Dehydration    2. Nausea             Electronically signed by: Lucy Sanchez M.D., 10/8/2020 4:55 PM

## 2020-10-08 NOTE — PREPROCEDURE INSTRUCTIONS
"PAT call made 10/8/2020 at approx 0922, spoke with pt after confirming 2 pt identifiers. Health hx, medications, allergies  reviewed with pt, as well as pre-procedure/sedation instructions. Respiratory screen completed. Pt denies being sick/symptomatic (fever, cough, SOB, diarrhea) w/in last 72 hrs, or having close contact w/ sick individuals in the past 2 wks. Pt education to notify his MD should he become symptomatic prior to procedure. Pt verbalizes understanding. Pt told to bring a form fitting mask and the he will be wearing it entire stay. Informed pt it is recommended pt self isolate for 72 hrs or from time of this call until procedure, also that we request the  to remain on site until pt is discharged. Pt verbalizes understanding.     Pt reports nausea x at least 4 days due to \"a combination\" of back pain and \"I think I caught a bug.\" pt reports decrease in urinary OP with dark urine. Pt states he has gatorade. encouraged pt to take swallow of gatorade every 15 min rather than drink a whole glass at a time since he reports nausea with fluid intake, also if symptoms persist for rest of day, if he skin becomes dry or doughy or if he develops any other symptoms such as fever, chills, body aches, to go back to the ED. Gave pt Dr Larson phone # to call if he is not better over weekend. Pt verbalizes understanding.  "

## 2020-10-09 NOTE — ED NOTES
Discharge instructions given to pt with verbalized understanding.  Pt ambulatory out of the ER with his wife at this time.

## 2020-10-12 ENCOUNTER — APPOINTMENT (OUTPATIENT)
Dept: RADIOLOGY | Facility: REHABILITATION | Age: 55
End: 2020-10-12
Attending: PHYSICAL MEDICINE & REHABILITATION
Payer: COMMERCIAL

## 2020-10-12 ENCOUNTER — HOSPITAL ENCOUNTER (OUTPATIENT)
Facility: REHABILITATION | Age: 55
End: 2020-10-12
Attending: PHYSICAL MEDICINE & REHABILITATION | Admitting: PHYSICAL MEDICINE & REHABILITATION
Payer: COMMERCIAL

## 2020-10-12 VITALS
TEMPERATURE: 98.5 F | DIASTOLIC BLOOD PRESSURE: 94 MMHG | SYSTOLIC BLOOD PRESSURE: 127 MMHG | HEART RATE: 90 BPM | OXYGEN SATURATION: 94 % | HEIGHT: 72 IN | RESPIRATION RATE: 16 BRPM | WEIGHT: 220.02 LBS | BODY MASS INDEX: 29.8 KG/M2

## 2020-10-12 PROCEDURE — 64483 NJX AA&/STRD TFRM EPI L/S 1: CPT

## 2020-10-12 PROCEDURE — 700117 HCHG RX CONTRAST REV CODE 255

## 2020-10-12 PROCEDURE — 700111 HCHG RX REV CODE 636 W/ 250 OVERRIDE (IP)

## 2020-10-12 RX ORDER — DEXAMETHASONE SODIUM PHOSPHATE 10 MG/ML
INJECTION, SOLUTION INTRAMUSCULAR; INTRAVENOUS
Status: COMPLETED
Start: 2020-10-12 | End: 2020-10-12

## 2020-10-12 RX ORDER — LIDOCAINE HYDROCHLORIDE 10 MG/ML
INJECTION, SOLUTION EPIDURAL; INFILTRATION; INTRACAUDAL; PERINEURAL
Status: COMPLETED
Start: 2020-10-12 | End: 2020-10-12

## 2020-10-12 RX ADMIN — IOHEXOL 5 ML: 240 INJECTION, SOLUTION INTRATHECAL; INTRAVASCULAR; INTRAVENOUS; ORAL at 14:11

## 2020-10-12 RX ADMIN — DEXAMETHASONE SODIUM PHOSPHATE 10 MG: 10 INJECTION, SOLUTION INTRAMUSCULAR; INTRAVENOUS at 14:12

## 2020-10-12 RX ADMIN — LIDOCAINE HYDROCHLORIDE 10 ML: 10 INJECTION, SOLUTION EPIDURAL; INFILTRATION; INTRACAUDAL; PERINEURAL at 14:10

## 2020-10-12 ASSESSMENT — PAIN DESCRIPTION - PAIN TYPE: TYPE: CHRONIC PAIN

## 2020-10-12 ASSESSMENT — FIBROSIS 4 INDEX: FIB4 SCORE: 0.56

## 2020-10-12 NOTE — NON-PROVIDER
Pt given verbal and written d/c instructions including verbal infection prevention information and s/s of post procedure infection and verbalizes understanding. denies nsaid use in last 3 days, denies blood thinners use in last 5 days, denies current infection or abx use. Med rec complete. Pt has post procedural ride home with his wife.

## 2020-10-12 NOTE — INTERVAL H&P NOTE
H&P reviewed. The patient was examined and there are no changes to the H&P     Lungs clear to auscultation bilaterally.  No abdominal tenderness.  Heart regular rate and rhythm.  Vitals reviewed.  Patient is nontoxic-appearing.     I reviewed the notes in the emergency department from Dr. Lucy Sanchez From 10/8/2020 where the patient seen for dehydration and nausea.  The patient was discharged stable with supportive care.    Proceed with the originally scheduled procedure

## 2020-10-12 NOTE — OP REPORT
Date of Service: 10/12/2020     Patient: Zachery Esquivel 54 y.o. male     MRN: [id number]     Physician/s: Jack Larson MD    Pre-operative Diagnosis: Lumbar radiculopathy    Post-operative Diagnosis: Lumbar radiculopathy    Procedure: bilateral  Lumbar Transforaminal Epidural Steroid  at the L3-4 levels.     Description of procedure:    The risks, benefits, and alternatives of the procedure were reviewed and discussed with the patient.  Written informed consent was freely obtained. A pre-procedural time-out was conducted by the physician verifying patient’s identity, procedure to be performed, procedure site and side, and allergy verification. Appropriate equipment was determined to be in place for the procedure.         The patient's vital signs were carefully monitored before, throughout, and after the procedure.     In the fluoroscopy suite the patient was placed in a prone position, a pillow placed underneath their umbilicus. The skin was prepped and draped in the usual sterile fashion.     The fluoroscope was placed over the lumbar spine and adjusted into the proper AP/Oblique view to enter the transforaminal space just adjacent to the pedicle at the levels below. The targets for injection were then marked at the right L3-4. A 27g 1.5 inch needle was placed into the marked site, and 1mL of 1% Lidocaine was injected subcutaneously into the epidermal and dermal layers. The needle was removed intact.  A 25g 3.5 inch spinal needle was then placed and advanced under fluoroscopic guidance in an oblique view towards the epidural space of the levels noted above. The needle position was confirmed to not be past the 6 o'clock position in the AP view and it was in the neuroforamen in the lateral view.        The fluoroscope was was then adjusted over the lumbar spine and adjusted into the proper AP/Oblique view to enter the transforaminal space adjacent to the pedicle at the LEFT  L3-4. A 27g 1.5 inch needle was  placed into the marked site, and 1mL of 1% Lidocaine was injected subcutaneously into the epidermal and dermal layers. The needle was removed intact.  A 25g 3.5 inch spinal needle was then placed and advanced under fluoroscopic guidance in an oblique view towards the epidural space of the levels noted above. The needle position was confirmed to not be past the 6 o'clock position in the AP view and it was in the neuroforamen in the lateral view.       Under live fluoroscopic guidance in the AP view, contrast dye was used to highlight the epidural space spread of each level above. Final fluoroscopic images were saved.  Following negative aspiration, 1mL of 1% lidocaine preservative free with 10 mg of dexamethasone was then injected at each level above, and the needles were removed intact after restyleted. The patient's back was covered with a 4x4 gauze, the area was cleansed with sterile normal saline, and a dressing was applied. There were no complications noted.     The patient was then evaluated post-procedure, and was hemodynamically stable prior to leaving the post-operative care unit.     Follow-up as scheduled    Jack Larson MD  Physical Medicine and Rehabilitation  Interventional Spine and Sports Physiatry  [facility]          CPT codes  Transforaminal epidural injection- lumbar or sacral (first level):  19832-89

## 2020-10-12 NOTE — PROGRESS NOTES
Preprocedure assessment completed.  Pertinent health information elevated Liver function test communicated to physician and staff during time out.  Patient positioned preprocedure by RN, CST, and XRAY.  Pillow under ankles for support.

## 2020-10-13 ENCOUNTER — TELEPHONE (OUTPATIENT)
Dept: PHYSICAL MEDICINE AND REHAB | Facility: MEDICAL CENTER | Age: 55
End: 2020-10-13

## 2020-10-13 ENCOUNTER — OFFICE VISIT (OUTPATIENT)
Dept: MEDICAL GROUP | Age: 55
End: 2020-10-13
Payer: COMMERCIAL

## 2020-10-13 VITALS
HEART RATE: 100 BPM | HEIGHT: 72 IN | OXYGEN SATURATION: 93 % | TEMPERATURE: 98.1 F | DIASTOLIC BLOOD PRESSURE: 74 MMHG | SYSTOLIC BLOOD PRESSURE: 118 MMHG | BODY MASS INDEX: 29.62 KG/M2 | WEIGHT: 218.7 LBS

## 2020-10-13 DIAGNOSIS — Z12.11 SCREENING FOR COLORECTAL CANCER: ICD-10-CM

## 2020-10-13 DIAGNOSIS — R73.01 ELEVATED FASTING BLOOD SUGAR: ICD-10-CM

## 2020-10-13 DIAGNOSIS — Z13.29 ENCOUNTER FOR SCREENING FOR ENDOCRINE DISORDER: ICD-10-CM

## 2020-10-13 DIAGNOSIS — Z12.5 SCREENING PSA (PROSTATE SPECIFIC ANTIGEN): ICD-10-CM

## 2020-10-13 DIAGNOSIS — R79.89 ELEVATED LFTS: ICD-10-CM

## 2020-10-13 DIAGNOSIS — Z13.220 LIPID SCREENING: ICD-10-CM

## 2020-10-13 DIAGNOSIS — R82.998 DARK URINE: ICD-10-CM

## 2020-10-13 DIAGNOSIS — R10.84 GENERALIZED ABDOMINAL PAIN: ICD-10-CM

## 2020-10-13 DIAGNOSIS — Z11.59 NEED FOR HEPATITIS C SCREENING TEST: ICD-10-CM

## 2020-10-13 DIAGNOSIS — Z12.12 SCREENING FOR COLORECTAL CANCER: ICD-10-CM

## 2020-10-13 LAB
APPEARANCE UR: CLEAR
BILIRUB UR STRIP-MCNC: NEGATIVE MG/DL
COLOR UR AUTO: YELLOW
GLUCOSE UR STRIP.AUTO-MCNC: NEGATIVE MG/DL
HBA1C MFR BLD: 5.9 % (ref 0–5.6)
INT CON NEG: NEGATIVE
INT CON POS: POSITIVE
KETONES UR STRIP.AUTO-MCNC: NEGATIVE MG/DL
LEUKOCYTE ESTERASE UR QL STRIP.AUTO: NEGATIVE
NITRITE UR QL STRIP.AUTO: NEGATIVE
PH UR STRIP.AUTO: 5.5 [PH] (ref 5–8)
PROT UR QL STRIP: NORMAL MG/DL
RBC UR QL AUTO: NEGATIVE
SP GR UR STRIP.AUTO: 1.03
UROBILINOGEN UR STRIP-MCNC: 0.2 MG/DL

## 2020-10-13 PROCEDURE — 81002 URINALYSIS NONAUTO W/O SCOPE: CPT | Performed by: PHYSICIAN ASSISTANT

## 2020-10-13 PROCEDURE — 83036 HEMOGLOBIN GLYCOSYLATED A1C: CPT | Performed by: PHYSICIAN ASSISTANT

## 2020-10-13 PROCEDURE — 99215 OFFICE O/P EST HI 40 MIN: CPT | Performed by: PHYSICIAN ASSISTANT

## 2020-10-13 RX ORDER — TAMSULOSIN HYDROCHLORIDE 0.4 MG/1
0.4 CAPSULE ORAL
COMMUNITY
End: 2023-04-12

## 2020-10-13 SDOH — HEALTH STABILITY: MENTAL HEALTH: HOW OFTEN DO YOU HAVE 6 OR MORE DRINKS ON ONE OCCASION?: NEVER

## 2020-10-13 SDOH — HEALTH STABILITY: MENTAL HEALTH: HOW MANY STANDARD DRINKS CONTAINING ALCOHOL DO YOU HAVE ON A TYPICAL DAY?: 1 OR 2

## 2020-10-13 SDOH — HEALTH STABILITY: MENTAL HEALTH: HOW OFTEN DO YOU HAVE A DRINK CONTAINING ALCOHOL?: 2-3 TIMES A WEEK

## 2020-10-13 ASSESSMENT — FIBROSIS 4 INDEX: FIB4 SCORE: 0.56

## 2020-10-13 NOTE — LETTER
[facility]  Salima Benz P.A.-C.  [address]  [place] [address]  Fax: [phone]   Authorization for Release/Disclosure of   Protected Health Information   Name: DENILSON MITCHELL : 1965 SSN: [id number]   Address: [address] [place] [address]  [place] [address] Phone:    [phone] (home)    I authorize the entity listed below to release/disclose the PHI below to:   [facility]/Salima Benz P.A.-C. and Salima Benz P.A.-C.   Provider or Entity Name:  [facility]   Address   City, State, Zip   Phone:      Fax:     Reason for request: continuity of care   Information to be released:    [  ] LAST COLONOSCOPY,  including any PATH REPORT and follow-up  [ xx ] LAST FIT/COLOGUARD RESULT [  ] LAST DEXA  [  ] LAST MAMMOGRAM  [  ] LAST PAP  [  ] LAST LABS [  ] RETINA EXAM REPORT  [  ] IMMUNIZATION RECORDS  [  ] Release all info      [  ] Check here and initial the line next to each item to release ALL health information INCLUDING  _____ Care and treatment for drug and / or alcohol abuse  _____ HIV testing, infection status, or AIDS  _____ Genetic Testing    DATES OF SERVICE OR TIME PERIOD TO BE DISCLOSED: _____________  I understand and acknowledge that:  * This Authorization may be revoked at any time by you in writing, except if your health information has already been used or disclosed.  * Your health information that will be used or disclosed as a result of you signing this authorization could be re-disclosed by the recipient. If this occurs, your re-disclosed health information may no longer be protected by State or Federal laws.  * You may refuse to sign this Authorization. Your refusal will not affect your ability to obtain treatment.  * This Authorization becomes effective upon signing and will  on (date) __________.      If no date is indicated, this Authorization will  one (1) year from the signature date.    Name: Denilson Mitchell    Signature:   Date:     10/13/2020       PLEASE  FAX REQUESTED RECORDS BACK TO: [phone]

## 2020-10-13 NOTE — TELEPHONE ENCOUNTER
MARCM to call us back in regards to his SP that was done with Dr. Larson dated 10/12/2020 for his BILATERAL L3-4 transforaminal epidural steroid injection with fluoroscopic guidance.

## 2020-10-14 ENCOUNTER — HOSPITAL ENCOUNTER (OUTPATIENT)
Dept: LAB | Facility: MEDICAL CENTER | Age: 55
End: 2020-10-14
Attending: PHYSICIAN ASSISTANT
Payer: COMMERCIAL

## 2020-10-14 DIAGNOSIS — Z12.5 SCREENING PSA (PROSTATE SPECIFIC ANTIGEN): ICD-10-CM

## 2020-10-14 DIAGNOSIS — R73.01 ELEVATED FASTING BLOOD SUGAR: ICD-10-CM

## 2020-10-14 DIAGNOSIS — Z13.29 ENCOUNTER FOR SCREENING FOR ENDOCRINE DISORDER: ICD-10-CM

## 2020-10-14 DIAGNOSIS — R10.84 GENERALIZED ABDOMINAL PAIN: ICD-10-CM

## 2020-10-14 DIAGNOSIS — R79.89 ELEVATED LFTS: ICD-10-CM

## 2020-10-14 DIAGNOSIS — Z13.220 LIPID SCREENING: ICD-10-CM

## 2020-10-14 DIAGNOSIS — Z11.59 NEED FOR HEPATITIS C SCREENING TEST: ICD-10-CM

## 2020-10-14 LAB
ALBUMIN SERPL BCP-MCNC: 3.8 G/DL (ref 3.2–4.9)
ALBUMIN/GLOB SERPL: 1.5 G/DL
ALP SERPL-CCNC: 61 U/L (ref 30–99)
ALT SERPL-CCNC: 87 U/L (ref 2–50)
ANION GAP SERPL CALC-SCNC: 11 MMOL/L (ref 7–16)
AST SERPL-CCNC: 24 U/L (ref 12–45)
BASOPHILS # BLD AUTO: 0.6 % (ref 0–1.8)
BASOPHILS # BLD: 0.06 K/UL (ref 0–0.12)
BILIRUB SERPL-MCNC: 0.5 MG/DL (ref 0.1–1.5)
BUN SERPL-MCNC: 19 MG/DL (ref 8–22)
CALCIUM SERPL-MCNC: 8.6 MG/DL (ref 8.5–10.5)
CHLORIDE SERPL-SCNC: 107 MMOL/L (ref 96–112)
CHOLEST SERPL-MCNC: 156 MG/DL (ref 100–199)
CO2 SERPL-SCNC: 25 MMOL/L (ref 20–33)
CREAT SERPL-MCNC: 0.97 MG/DL (ref 0.5–1.4)
EOSINOPHIL # BLD AUTO: 0.13 K/UL (ref 0–0.51)
EOSINOPHIL NFR BLD: 1.4 % (ref 0–6.9)
ERYTHROCYTE [DISTWIDTH] IN BLOOD BY AUTOMATED COUNT: 42.4 FL (ref 35.9–50)
FASTING STATUS PATIENT QL REPORTED: NORMAL
GLOBULIN SER CALC-MCNC: 2.6 G/DL (ref 1.9–3.5)
GLUCOSE SERPL-MCNC: 81 MG/DL (ref 65–99)
HCT VFR BLD AUTO: 46.8 % (ref 42–52)
HCV AB SER QL: NORMAL
HDLC SERPL-MCNC: 35 MG/DL
HGB BLD-MCNC: 15.1 G/DL (ref 14–18)
IMM GRANULOCYTES # BLD AUTO: 0.06 K/UL (ref 0–0.11)
IMM GRANULOCYTES NFR BLD AUTO: 0.6 % (ref 0–0.9)
LDLC SERPL CALC-MCNC: 98 MG/DL
LYMPHOCYTES # BLD AUTO: 2.35 K/UL (ref 1–4.8)
LYMPHOCYTES NFR BLD: 25.3 % (ref 22–41)
MCH RBC QN AUTO: 29.9 PG (ref 27–33)
MCHC RBC AUTO-ENTMCNC: 32.3 G/DL (ref 33.7–35.3)
MCV RBC AUTO: 92.7 FL (ref 81.4–97.8)
MONOCYTES # BLD AUTO: 0.77 K/UL (ref 0–0.85)
MONOCYTES NFR BLD AUTO: 8.3 % (ref 0–13.4)
NEUTROPHILS # BLD AUTO: 5.93 K/UL (ref 1.82–7.42)
NEUTROPHILS NFR BLD: 63.8 % (ref 44–72)
NRBC # BLD AUTO: 0 K/UL
NRBC BLD-RTO: 0 /100 WBC
PLATELET # BLD AUTO: 275 K/UL (ref 164–446)
PMV BLD AUTO: 10.4 FL (ref 9–12.9)
POTASSIUM SERPL-SCNC: 3.7 MMOL/L (ref 3.6–5.5)
PROT SERPL-MCNC: 6.4 G/DL (ref 6–8.2)
PSA SERPL-MCNC: 0.55 NG/ML (ref 0–4)
RBC # BLD AUTO: 5.05 M/UL (ref 4.7–6.1)
SODIUM SERPL-SCNC: 143 MMOL/L (ref 135–145)
TRIGL SERPL-MCNC: 113 MG/DL (ref 0–149)
TSH SERPL DL<=0.005 MIU/L-ACNC: 3.46 UIU/ML (ref 0.38–5.33)
WBC # BLD AUTO: 9.3 K/UL (ref 4.8–10.8)

## 2020-10-14 PROCEDURE — G0472 HEP C SCREEN HIGH RISK/OTHER: HCPCS

## 2020-10-14 PROCEDURE — 85025 COMPLETE CBC W/AUTO DIFF WBC: CPT

## 2020-10-14 PROCEDURE — 36415 COLL VENOUS BLD VENIPUNCTURE: CPT

## 2020-10-14 PROCEDURE — 84153 ASSAY OF PSA TOTAL: CPT

## 2020-10-14 PROCEDURE — 80053 COMPREHEN METABOLIC PANEL: CPT

## 2020-10-14 PROCEDURE — 84443 ASSAY THYROID STIM HORMONE: CPT

## 2020-10-14 PROCEDURE — 80061 LIPID PANEL: CPT

## 2020-10-15 ENCOUNTER — HOSPITAL ENCOUNTER (OUTPATIENT)
Dept: RADIOLOGY | Facility: MEDICAL CENTER | Age: 55
End: 2020-10-15
Attending: NEUROLOGICAL SURGERY
Payer: COMMERCIAL

## 2020-10-15 DIAGNOSIS — M54.40 BILATERAL LOW BACK PAIN WITH SCIATICA, SCIATICA LATERALITY UNSPECIFIED, UNSPECIFIED CHRONICITY: ICD-10-CM

## 2020-10-15 PROCEDURE — 72110 X-RAY EXAM L-2 SPINE 4/>VWS: CPT

## 2020-10-16 NOTE — PROGRESS NOTES
"  Subjective:     Chief Complaint   Patient presents with   • Abdominal Pain     Lower front area. It has gotten better   • Nausea     Was unable to keep anyhting down and became dehydrated. But that has improved.   • Back Pain     Had an epidural yesterday. Back pain isn't as bad and is no longer taking a pain medicaiton   • Prediabetes     Blood sugar was elevated to 130 while in the hospital and he hasn't ate in over 5 days     Zachery Esquivel is a 54 y.o. male here today for evaluation and management of:    Generalized abdominal pain/Elevated LFTs/Dark urine  Patient reports multiple ED visits for back pain and most recent for abdominal pain. Due to pain in back was inappropriately taking pain medications as he was in so much pain. States had epidural recently and seems to be helping. Since most recent ED visit for abdominal pain has been off all medications in relation to pain as he was concerned he caused the problem.    States LFTs \"always elevated' when is on opiates though he is taking oxycodone without APAP. No change in stool appearance.     Pain during ED visit was in epigastric and LLQ regions. Improving substantially. Reports 90% improvement.     No current nausea or vomiting.   Reports urine seems to be dark and concerned. No blood noted. No pain. No kidney pain.  No testicular pain or change in partners.     Elevated fasting blood sugar  Concerned with fasting blood sugar and potential for DM.  Denies polyuria, polydipsia, blurred vision, or neuropathies.      ROS   Denies any recent fevers or chills. No nausea or vomiting. No diarrhea. No chest pains or shortness of breath. No lower extremity edema.    Allergies   Allergen Reactions   • Penicillins Rash     .   • Tylenol      Can't take due to elevated liver enzymes   • Bactrim [Sulfamethoxazole W-Trimethoprim]      Itching rash palms and soles       Current medicines (including changes today)  Current Outpatient Medications   Medication Sig " Dispense Refill   • tamsulosin (FLOMAX) 0.4 MG capsule Take 0.4 mg by mouth ONE-HALF HOUR AFTER BREAKFAST.     • fluticasone (FLONASE) 50 MCG/ACT nasal spray Spray 1 Spray in nose 1 time daily as needed.     • promethazine (PHENERGAN) 25 MG Suppos Insert 1 Suppository in rectum every 8 hours as needed for Nausea/Vomiting for up to 15 doses. 15 Suppository 0   • gabapentin (NEURONTIN) 300 MG Cap Take 1-2 Caps by mouth 3 times a day as needed (pain). (Patient not taking: Reported on 10/13/2020) 180 Cap 3   • Naloxone (NARCAN) 4 MG/0.1ML Liquid Spray 4 mg in nose as needed (For severe sleepiness or difficulty breathing from possible overdose. Call 911 after administration.). (Patient not taking: Reported on 10/13/2020) 1 Each 0   • oxyCODONE immediate-release (ROXICODONE) 5 MG Tab Take 1 Tab by mouth every four hours as needed for Severe Pain for up to 14 days. (Patient not taking: Reported on 10/13/2020) 84 Tab 0   • diclofenac DR (VOLTAREN) 75 MG Tablet Delayed Response Take 1 Tab by mouth 2 Times a Day. (Patient not taking: Reported on 10/13/2020) 60 Tab 0   • methocarbamol (ROBAXIN) 500 MG Tab Take 1 Tab by mouth 4 times a day. (Patient not taking: Reported on 10/13/2020) 120 Tab 0     No current facility-administered medications for this visit.        Patient Active Problem List    Diagnosis Date Noted   • Elevated fasting blood sugar 10/13/2020   • Elevated LFTs 09/29/2020   • Acute low back pain with sciatica 09/27/2020   • Obesity (BMI 30-39.9) 01/09/2018   • Polyuria 06/28/2015       Family History   Problem Relation Age of Onset   • Diabetes Father    • Diabetes Paternal Grandmother    • Cancer Sister 35        breast   • Heart Disease Neg Hx    • Stroke Neg Hx    • Hypertension Neg Hx    • Hyperlipidemia Neg Hx           Objective:     Vitals:    10/13/20 1034   BP: 118/74   BP Location: Left arm   Patient Position: Sitting   BP Cuff Size: Adult   Pulse: 100   Temp: 36.7 °C (98.1 °F)   TempSrc: Temporal    SpO2: 93%   Weight: 99.2 kg (218 lb 11.1 oz)   Height: 1.829 m (6')     Body mass index is 29.66 kg/m².     Physical Exam:  Gen: Well developed, well nourished in no acute distress.   Skin: Pink, warm, and dry  HEENT: conjunctiva non-injected, sclera non-icteric. EOMs intact.   Neck: Supple, trachea midline. No adenopathy or masses in the neck or supraclavicular regions.  Lungs: Effort is normal. Clear to auscultation bilaterally with good excursion.  CV: regular rate and rhythm.  Abdomen: soft, nontender, + BS. No HSM.  No CVAT  Ext: no edema, color normal, vascularity normal, temperature normal  Alert and oriented Eye contact is good, speech goal directed, affect calm    Results for orders placed or performed in visit on 10/13/20   POCT  A1C   Result Value Ref Range    Glycohemoglobin 5.9 (A) 0.0 - 5.6 %    Internal Control Negative Negative     Internal Control Positive Positive    POCT Urinalysis   Result Value Ref Range    POC Color yellow Negative    POC Appearance clear Negative    POC Leukocyte Esterase Negative Negative    POC Nitrites Negative Negative    POC Urobiligen 0.2 Negative (0.2) mg/dL    POC Protein trace Negative mg/dL    POC Urine PH 5.5 5.0 - 8.0    POC Blood Negative Negative    POC Specific Gravity 1.030 <1.005 - >1.030    POC Ketones Negative Negative mg/dL    POC Bilirubin Negative Negative mg/dL    POC Glucose Negative Negative mg/dL     Reviewed and discussed above labs with patient in office.      Assessment and Plan:   The following treatment plan was discussed:     1. Generalized abdominal pain  2. Elevated LFTs  Abdominal pain improving. Advised would like liver ultrasound due to elevation of LFTs and how high they are. Patient would like to wait especially since he is not taking opiates at the moment and recheck prior to next visit. If remains elevated will obtain US.   Any change or worsening of signs or symptoms, patient encouraged to follow-up or report to emergency room for  further evaluation. Patient verbalizes understanding and agrees.  - CBC WITH DIFFERENTIAL; Future  - Comp Metabolic Panel; Future    3. Elevated fasting blood sugar  A1c normal in office. Encouraged dietary modifications and exercise.   - POCT  A1C  - Comp Metabolic Panel; Future    4. Dark urine  Trace protein noted in urine. Will obtain UA at next OV.  Patient does not feel like he completely empties bladder. Under care of urologist and has prescription for flomax but not taking as it made him dizzy (over 1 year ago). He will restart and see if any improvement. No JIM today as we are obtaining a screening PSA.   - tamsulosin (FLOMAX) 0.4 MG capsule; Take 0.4 mg by mouth ONE-HALF HOUR AFTER BREAKFAST.  - POCT Urinalysis    5. Lipid screening  - Lipid Profile; Future    6. Need for hepatitis C screening test  - HCV Scrn ( [id number] 1xLife); Future    7. Screening for colorectal cancer  - REFERRAL TO GI FOR COLONOSCOPY    8. Screening PSA (prostate specific antigen)  - PROSTATE SPECIFIC AG SCREENING; Future    9. Encounter for screening for endocrine disorder  - TSH WITH REFLEX TO FT4; Future    Followup: Return around 2020 for lab review, follow-up, sooner if needed.         Patient was seen for 43 minutes face to face of which > 50% of appointment time was spent on counseling and coordination of care regarding the above.

## 2020-11-03 ENCOUNTER — OFFICE VISIT (OUTPATIENT)
Dept: PHYSICAL MEDICINE AND REHAB | Facility: MEDICAL CENTER | Age: 55
End: 2020-11-03
Payer: COMMERCIAL

## 2020-11-03 VITALS
DIASTOLIC BLOOD PRESSURE: 80 MMHG | TEMPERATURE: 97.8 F | OXYGEN SATURATION: 96 % | HEIGHT: 72 IN | HEART RATE: 109 BPM | WEIGHT: 228.18 LBS | SYSTOLIC BLOOD PRESSURE: 122 MMHG | BODY MASS INDEX: 30.91 KG/M2

## 2020-11-03 DIAGNOSIS — Z98.890 HISTORY OF LUMBOSACRAL SPINE SURGERY: ICD-10-CM

## 2020-11-03 DIAGNOSIS — M48.061 SPINAL STENOSIS OF LUMBAR REGION, UNSPECIFIED WHETHER NEUROGENIC CLAUDICATION PRESENT: ICD-10-CM

## 2020-11-03 DIAGNOSIS — M54.16 LUMBAR RADICULOPATHY: ICD-10-CM

## 2020-11-03 PROCEDURE — 99214 OFFICE O/P EST MOD 30 MIN: CPT | Performed by: PHYSICAL MEDICINE & REHABILITATION

## 2020-11-03 ASSESSMENT — PAIN SCALES - GENERAL: PAINLEVEL: 3=SLIGHT PAIN

## 2020-11-03 ASSESSMENT — PATIENT HEALTH QUESTIONNAIRE - PHQ9
SUM OF ALL RESPONSES TO PHQ QUESTIONS 1-9: 6
5. POOR APPETITE OR OVEREATING: 1 - SEVERAL DAYS
CLINICAL INTERPRETATION OF PHQ2 SCORE: 1

## 2020-11-03 ASSESSMENT — FIBROSIS 4 INDEX: FIB4 SCORE: 0.51

## 2020-11-05 NOTE — PROGRESS NOTES
Follow up patient note  Interventional spine and sports physiatry, Physical medicine rehabilitation      Chief complaint:   Chief Complaint   Patient presents with   • Follow-Up     Back pain         HISTORY    Please see new patient note by Dr Larson,  for more details.     HPI  Patient identification: Zachery Esquivel ,  1965,   With Diagnoses of Lumbar radiculopathy, Spinal stenosis of lumbar region, unspecified whether neurogenic claudication present, and History of lumbosacral spine surgery L4-5 fusion done by Dr Barrie Espinoza were pertinent to this visit.     Procedures:  Bilateral L3-4 transforaminal epidural steroid injection with 80% pain relief post procedure    -After the epidural the patient had dramatic improvement of pain with preprocedure pain of 10 out of 10 and significant functional deficits and difficult time doing his ADLs.  After the procedure he is very happy and now his pain is 2 out of 10 in intensity.  He has full range of motion lumbar spine is able to do all of his ADLs on his own with minimal difficulty.  This pain is now nonradiating.  Aching in quality.       ROS Red Flags :   Fever, Chills, Sweats: Denies  Involuntary Weight Loss: Denies  Bowel/Bladder Incontinence: Denies  Saddle Anesthesia: Denies        PMHx:   Past Medical History:   Diagnosis Date   • Low back pain    • S/P laparoscopic fundoplication        PSHx:   Past Surgical History:   Procedure Laterality Date   • PB INJ,FORAMEN,L/S,1 LEVEL Bilateral 10/12/2020    Procedure: INJECTION, SPINE, LUMBOSACRAL, EPIDURAL, 1 LEVEL, TRANSFORAMINAL APPROACH;  Surgeon: Jack Larson M.D.;  [facility];  Service: Pain Management   • ABDOMINAL EXPLORATION     • APPENDECTOMY     • CHOLECYSTECTOMY     • LUMBAR LAMINECTOMY DISKECTOMY      l4-5 with fusion   • NISSEN FUNDOPLICATION LAPAROSCOPIC         Family history   Family History   Problem Relation Age of Onset   • Diabetes Father    • Diabetes  Paternal Grandmother    • Cancer Sister 35        breast   • Heart Disease Neg Hx    • Stroke Neg Hx    • Hypertension Neg Hx    • Hyperlipidemia Neg Hx          Medications:   Outpatient Medications Marked as Taking for the 11/3/20 encounter (Office Visit) with Jack Larson M.D.   Medication Sig Dispense Refill   • fluticasone (FLONASE) 50 MCG/ACT nasal spray Spray 1 Spray in nose 1 time daily as needed.          Current Outpatient Medications on File Prior to Visit   Medication Sig Dispense Refill   • fluticasone (FLONASE) 50 MCG/ACT nasal spray Spray 1 Spray in nose 1 time daily as needed.     • tamsulosin (FLOMAX) 0.4 MG capsule Take 0.4 mg by mouth ONE-HALF HOUR AFTER BREAKFAST.     • promethazine (PHENERGAN) 25 MG Suppos Insert 1 Suppository in rectum every 8 hours as needed for Nausea/Vomiting for up to 15 doses. (Patient not taking: Reported on 11/3/2020) 15 Suppository 0   • gabapentin (NEURONTIN) 300 MG Cap Take 1-2 Caps by mouth 3 times a day as needed (pain). (Patient not taking: Reported on 10/13/2020) 180 Cap 3   • Naloxone (NARCAN) 4 MG/0.1ML Liquid Spray 4 mg in nose as needed (For severe sleepiness or difficulty breathing from possible overdose. Call 911 after administration.). (Patient not taking: Reported on 10/13/2020) 1 Each 0   • diclofenac DR (VOLTAREN) 75 MG Tablet Delayed Response Take 1 Tab by mouth 2 Times a Day. (Patient not taking: Reported on 10/13/2020) 60 Tab 0   • methocarbamol (ROBAXIN) 500 MG Tab Take 1 Tab by mouth 4 times a day. (Patient not taking: Reported on 10/13/2020) 120 Tab 0     No current facility-administered medications on file prior to visit.          Allergies:   Allergies   Allergen Reactions   • Penicillins Rash     .   • Tylenol      Can't take due to elevated liver enzymes   • Bactrim [Sulfamethoxazole W-Trimethoprim]      Itching rash palms and soles       Social Hx:   Social History     Socioeconomic History   • Marital status:      Spouse name: Not  on file   • Number of children: Not on file   • Years of education: Not on file   • Highest education level: Not on file   Occupational History   • Not on file   Social Needs   • Financial resource strain: Not on file   • Food insecurity     Worry: Not on file     Inability: Not on file   • Transportation needs     Medical: Not on file     Non-medical: Not on file   Tobacco Use   • Smoking status: Never Smoker   • Smokeless tobacco: Never Used   Substance and Sexual Activity   • Alcohol use: Yes     Alcohol/week: 1.0 oz     Types: 2 Glasses of wine per week     Frequency: 2-3 times a week     Drinks per session: 1 or 2     Binge frequency: Never     Comment: Occasionally   • Drug use: No   • Sexual activity: Yes     Partners: Female   Lifestyle   • Physical activity     Days per week: Not on file     Minutes per session: Not on file   • Stress: Not on file   Relationships   • Social connections     Talks on phone: Not on file     Gets together: Not on file     Attends [employer] service: Not on file     Active member of club or organization: Not on file     Attends meetings of clubs or organizations: Not on file     Relationship status: Not on file   • Intimate partner violence     Fear of current or ex partner: Not on file     Emotionally abused: Not on file     Physically abused: Not on file     Forced sexual activity: Not on file   Other Topics Concern   •  Service No   • Blood Transfusions Yes   • Caffeine Concern No   • Occupational Exposure No   • Hobby Hazards No   • Sleep Concern Yes   • Stress Concern Yes   • Weight Concern No   • Special Diet No   • Back Care Yes     Comment: back brace   • Exercise Yes   • Bike Helmet No   • Seat Belt Yes   • Self-Exams No   Social History Narrative   • Not on file         EXAMINATION     Physical Exam:   Vitals: /80 (BP Location: Left arm, Patient Position: Sitting, BP Cuff Size: Adult)   Pulse (!) 109   Temp 36.6 °C (97.8 °F) (Temporal)   Ht 1.829 m (6')    Wt 103.5 kg (228 lb 2.8 oz)   SpO2 96%     Constitutional:   Body Habitus: Body mass index is 30.95 kg/m².  Cooperation: Fully cooperates with exam  Appearance: Well-groomed no disheveled    Respiratory-  breathing comfortable on room air, no audible wheezing  Cardiovascular- capillary refills less than 2 seconds. No lower extremity edema is noted.   Psychiatric- alert and oriented ×3. Normal affect.    MSK and Neuro: -  There are no signs of infection around the injection sites.   full  active range of motion with flexion, lateral flexion, and rotation bilaterally.   There is full  active range of motion with lumbar extension.      Palpation:   No tenderness to palpation in midline at T1-T12 levels. No tenderness to palpation in the left and right of the midline T1-L5, NEGATIVE for tenderness to palpation to the para-midline region in the lower lumbar levels.  palpation over SI joint: negative bilaterally    palpation in hip or over the gluteus medius tendon insertion: negative bilaterally      Lumbar spine Special tests  Neuro tension  Straight leg test negative bilaterally    Slump test negative bilaterally      Key points for the international standards for neurological classification of spinal cord injury (ISNCSCI) to light touch.     Dermatome R L                                      L2 2 2   L3 2 2   L4 2 2   L5 2 2   S1 2 2   S2 2 2         Motor Exam Lower Extremities    ? Myotome R L   Hip flexion L2 5 5   Knee extension L3 5 5   Ankle dorsiflexion L4 5 5   Toe extension L5 5 5   Ankle plantarflexion S1 5 5                 MEDICAL DECISION MAKING    DATA    Labs:   Lab Results   Component Value Date/Time    SODIUM 143 10/14/2020 09:30 AM    POTASSIUM 3.7 10/14/2020 09:30 AM    CHLORIDE 107 10/14/2020 09:30 AM    CO2 25 10/14/2020 09:30 AM    GLUCOSE 81 10/14/2020 09:30 AM    BUN 19 10/14/2020 09:30 AM    CREATININE 0.97 10/14/2020 09:30 AM        No results found for: PROTHROMBTM, INR     Lab Results    Component Value Date/Time    WBC 9.3 10/14/2020 09:30 AM    RBC 5.05 10/14/2020 09:30 AM    HEMOGLOBIN 15.1 10/14/2020 09:30 AM    HEMATOCRIT 46.8 10/14/2020 09:30 AM    MCV 92.7 10/14/2020 09:30 AM    MCH 29.9 10/14/2020 09:30 AM    MCHC 32.3 (L) 10/14/2020 09:30 AM    MPV 10.4 10/14/2020 09:30 AM    NEUTSPOLYS 63.80 10/14/2020 09:30 AM    LYMPHOCYTES 25.30 10/14/2020 09:30 AM    MONOCYTES 8.30 10/14/2020 09:30 AM    EOSINOPHILS 1.40 10/14/2020 09:30 AM    BASOPHILS 0.60 10/14/2020 09:30 AM        Lab Results   Component Value Date/Time    HBA1C 5.9 (A) 10/13/2020 11:45 AM          Imaging:   I personally reviewed following images    MRI lumbar spine 9/27/2020  At L5-S1 there is a disc protrusion.  This is causing mild to moderate central canal stenosis and moderate bilateral stenosis right worse than left.  History of L4-5 posterior fusion.  At L3-4 there is moderate to severe central canal stenosis and bilateral neuroforaminal stenosis.  Facet arthropathy at L3-4 and L5-S1.  There are no acute fractures.    I reviewed the fluoroscopic images from 10/12/2020 showing successful bilateral L3-4 transforaminal epidural steroid injection.  I reviewed these with the patient on 11/5/2020.    I reviewed the following radiology reports                                        Results for orders placed during the hospital encounter of 09/27/20   MR-LUMBAR SPINE-W/O    Impression 1.  L3-4 mild-moderate spinal stenosis    2.  L5-S1 left paracentral disc extrusion moderately narrowing the left lateral recess and foramen    3.  L4-5 postoperative changes appear within normal limits                Results for orders placed during the hospital encounter of 09/27/20   MR-LUMBAR SPINE-W/O    Impression 1.  L3-4 mild-moderate spinal stenosis    2.  L5-S1 left paracentral disc extrusion moderately narrowing the left lateral recess and foramen    3.  L4-5 postoperative changes appear within normal limits                                                                                                                                                                                                         Results for orders placed during the hospital encounter of 10/15/20   DX-LUMBAR SPINE-4+ VIEWS    Impression 1.  No compression deformity or acute fracture is identified.    2.  Degenerative disc disease and facet arthropathy is most prominent in the lower lumbar spine. Posterior fusion is noted at L4-L5.    3.  No focal instability is noted on flexion extension views.                                         DIAGNOSIS   Visit Diagnoses     ICD-10-CM   1. Lumbar radiculopathy  M54.16   2. Spinal stenosis of lumbar region, unspecified whether neurogenic claudication present  M48.061   3. History of lumbosacral spine surgery L4-5 fusion done by Dr Barrie sEpinoza  [id number]         ASSESSMENT and PLAN:     Zachery Esquivel  1965 male      Zachery was seen today for follow-up.    Diagnoses and all orders for this visit:    Lumbar radiculopathy  -     REFERRAL TO PHYSICAL THERAPY    Spinal stenosis of lumbar region, unspecified whether neurogenic claudication present  -     REFERRAL TO PHYSICAL THERAPY    History of lumbosacral spine surgery L4-5 fusion done by Dr Barrie Espinoza  -     REFERRAL TO PHYSICAL THERAPY      The patient is doing quite well after his epidural as above for treatment for his lumbar radiculopathy and severe back pain.  He still does have imaging findings of spinal stenosis and had a history of spine surgery as above.  I believe now the patient would tolerate physical therapy.  I provided him with a home exercise program and referred the patient to physical therapy as above. We have titrated the patient off of his oxycodone.    We have also titrated down off of the gabapentin and methocarbamol but he can use these as needed.    I discussed the case with Dr. Yamil Lui of spine surgery.    Follow up: PRN after PT    Thank you for  allowing me to participate in the care of this patient. If you have any questions please not hesitate to contact me.             Please note that this dictation was created using voice recognition software. I have made every reasonable attempt to correct obvious errors but there may be errors of grammar and content that I may have overlooked prior to finalization of this note.      Jack Larson MD  Interventional Spine and Sports Physiatry  Physical Medicine and Rehabilitation  [facility]

## 2020-11-09 ENCOUNTER — OFFICE VISIT (OUTPATIENT)
Dept: MEDICAL GROUP | Age: 55
End: 2020-11-09
Payer: COMMERCIAL

## 2020-11-09 ENCOUNTER — HOSPITAL ENCOUNTER (OUTPATIENT)
Dept: LAB | Facility: MEDICAL CENTER | Age: 55
End: 2020-11-09
Attending: PHYSICIAN ASSISTANT
Payer: COMMERCIAL

## 2020-11-09 VITALS
SYSTOLIC BLOOD PRESSURE: 118 MMHG | OXYGEN SATURATION: 93 % | HEART RATE: 79 BPM | DIASTOLIC BLOOD PRESSURE: 68 MMHG | BODY MASS INDEX: 31.23 KG/M2 | WEIGHT: 230.6 LBS | HEIGHT: 72 IN | TEMPERATURE: 97.7 F

## 2020-11-09 DIAGNOSIS — R79.89 ELEVATED LFTS: ICD-10-CM

## 2020-11-09 DIAGNOSIS — R10.84 GENERALIZED ABDOMINAL PAIN: ICD-10-CM

## 2020-11-09 DIAGNOSIS — Z23 NEED FOR VACCINATION: ICD-10-CM

## 2020-11-09 DIAGNOSIS — E66.9 OBESITY (BMI 30-39.9): ICD-10-CM

## 2020-11-09 DIAGNOSIS — R35.89 POLYURIA: ICD-10-CM

## 2020-11-09 DIAGNOSIS — R82.998 DARK URINE: ICD-10-CM

## 2020-11-09 LAB
ALBUMIN SERPL BCP-MCNC: 4.3 G/DL (ref 3.2–4.9)
ALP SERPL-CCNC: 51 U/L (ref 30–99)
ALT SERPL-CCNC: 35 U/L (ref 2–50)
APPEARANCE UR: CLEAR
AST SERPL-CCNC: 20 U/L (ref 12–45)
BILIRUB CONJ SERPL-MCNC: <0.2 MG/DL (ref 0.1–0.5)
BILIRUB INDIRECT SERPL-MCNC: NORMAL MG/DL (ref 0–1)
BILIRUB SERPL-MCNC: 0.9 MG/DL (ref 0.1–1.5)
BILIRUB UR STRIP-MCNC: NEGATIVE MG/DL
COLOR UR AUTO: YELLOW
GLUCOSE UR STRIP.AUTO-MCNC: NEGATIVE MG/DL
KETONES UR STRIP.AUTO-MCNC: NEGATIVE MG/DL
LEUKOCYTE ESTERASE UR QL STRIP.AUTO: NEGATIVE
NITRITE UR QL STRIP.AUTO: NEGATIVE
PH UR STRIP.AUTO: 5 [PH] (ref 5–8)
PROT SERPL-MCNC: 6.5 G/DL (ref 6–8.2)
PROT UR QL STRIP: NEGATIVE MG/DL
RBC UR QL AUTO: NEGATIVE
SP GR UR STRIP.AUTO: 1.03
UROBILINOGEN UR STRIP-MCNC: 0.2 MG/DL

## 2020-11-09 PROCEDURE — 36415 COLL VENOUS BLD VENIPUNCTURE: CPT

## 2020-11-09 PROCEDURE — 80076 HEPATIC FUNCTION PANEL: CPT

## 2020-11-09 PROCEDURE — 99213 OFFICE O/P EST LOW 20 MIN: CPT | Performed by: PHYSICIAN ASSISTANT

## 2020-11-09 PROCEDURE — 81002 URINALYSIS NONAUTO W/O SCOPE: CPT | Performed by: PHYSICIAN ASSISTANT

## 2020-11-09 ASSESSMENT — FIBROSIS 4 INDEX: FIB4 SCORE: 0.51

## 2020-11-09 NOTE — PROGRESS NOTES
Subjective:     Chief Complaint   Patient presents with   • Follow-Up     Generalized abdominal pain   • Lab Results     Zachery Esquivel is a 54 y.o. male here today for evaluation and management of:    Dark urine/Polyuria  Reports still doesn't feel like he completely empties bladder. Forgot to try Flomax.  Reports polyuria. Under care of urologist but hasn't been seen in a while.     Generalized abdominal pain/Elevated LFTs  Abdominal pain resolved since stopping medications. LFTs trending down.    Obesity  Reports he recently started walking and exercising. Eating better as well.       ROS   Denies any recent fevers or chills. No nausea or vomiting. No diarrhea. No chest pains or shortness of breath. No lower extremity edema.    Allergies   Allergen Reactions   • Penicillins Rash     .   • Tylenol      Can't take due to elevated liver enzymes   • Bactrim [Sulfamethoxazole W-Trimethoprim]      Itching rash palms and soles       Current medicines (including changes today)  Current Outpatient Medications   Medication Sig Dispense Refill   • tamsulosin (FLOMAX) 0.4 MG capsule Take 0.4 mg by mouth ONE-HALF HOUR AFTER BREAKFAST.       No current facility-administered medications for this visit.        Patient Active Problem List    Diagnosis Date Noted   • Elevated fasting blood sugar 10/13/2020   • Elevated LFTs 09/29/2020   • Acute low back pain with sciatica 09/27/2020   • Obesity (BMI 30-39.9) 01/09/2018   • Polyuria 06/28/2015       Family History   Problem Relation Age of Onset   • Diabetes Father    • Diabetes Paternal Grandmother    • Cancer Sister 35        breast   • Heart Disease Neg Hx    • Stroke Neg Hx    • Hypertension Neg Hx    • Hyperlipidemia Neg Hx           Objective:     Vitals:    11/09/20 0826   BP: 118/68   BP Location: Right arm   Patient Position: Sitting   BP Cuff Size: Adult   Pulse: 79   Temp: 36.5 °C (97.7 °F)   TempSrc: Temporal   SpO2: 93%   Weight: 104.6 kg (230 lb 9.6 oz)    Height: 1.829 m (6')     Body mass index is 31.27 kg/m².     Physical Exam:  Constitutional: Alert, no distress, well-groomed.  Obese male.  Skin: Warm, dry, good turgor, no rashes in visible areas.  Eye: Equal, round and reactive, conjunctiva clear, lids normal.  ENMT: Lips without lesions, good dentition, moist mucous membranes.  Neck: Trachea midline, no masses, no thyromegaly.  Cardiovascular: Regular rate and rhythm.  Chest: Effort normal. Clear to auscultation throughout. No adventitious sounds.   Abdomen: Soft, no gross masses.  MSK: Normal gait, moves all extremities.  Neuro: Grossly non-focal. No cranial nerve deficit. Strength and sensation intact.   Psych: Alert and oriented x3, normal affect and mood.    Component      Latest Ref Rng & Units 10/14/2020   WBC      4.8 - 10.8 K/uL 9.3   RBC      4.70 - 6.10 M/uL 5.05   Hemoglobin      14.0 - 18.0 g/dL 15.1   Hematocrit      42.0 - 52.0 % 46.8   MCV      81.4 - 97.8 fL 92.7   MCH      27.0 - 33.0 pg 29.9   MCHC      33.7 - 35.3 g/dL 32.3 (L)   RDW      35.9 - 50.0 fL 42.4   Platelet Count      164 - 446 K/uL 275   MPV      9.0 - 12.9 fL 10.4   Neutrophils-Polys      44.00 - 72.00 % 63.80   Lymphocytes      22.00 - 41.00 % 25.30   Monocytes      0.00 - 13.40 % 8.30   Eosinophils      0.00 - 6.90 % 1.40   Basophils      0.00 - 1.80 % 0.60   Immature Granulocytes      0.00 - 0.90 % 0.60   Nucleated RBC      /100 WBC 0.00   Neutrophils (Absolute)      1.82 - 7.42 K/uL 5.93   Lymphs (Absolute)      1.00 - 4.80 K/uL 2.35   Monos (Absolute)      0.00 - 0.85 K/uL 0.77   Eos (Absolute)      0.00 - 0.51 K/uL 0.13   Baso (Absolute)      0.00 - 0.12 K/uL 0.06   Immature Granulocytes (abs)      0.00 - 0.11 K/uL 0.06   NRBC (Absolute)      K/uL 0.00   Sodium      135 - 145 mmol/L 143   Potassium      3.6 - 5.5 mmol/L 3.7   Chloride      96 - 112 mmol/L 107   Co2      20 - 33 mmol/L 25   Anion Gap      7.0 - 16.0 11.0   Glucose      65 - 99 mg/dL 81   Bun      8 - 22  mg/dL 19   Creatinine      0.50 - 1.40 mg/dL 0.97   Calcium      8.5 - 10.5 mg/dL 8.6   AST(SGOT)      12 - 45 U/L 24   ALT(SGPT)      2 - 50 U/L 87 (H)   Alkaline Phosphatase      30 - 99 U/L 61   Total Bilirubin      0.1 - 1.5 mg/dL 0.5   Albumin      3.2 - 4.9 g/dL 3.8   Total Protein      6.0 - 8.2 g/dL 6.4   Globulin      1.9 - 3.5 g/dL 2.6   A-G Ratio      g/dL 1.5   Cholesterol,Tot      100 - 199 mg/dL 156   Triglycerides      0 - 149 mg/dL 113   HDL      >=40 mg/dL 35 (A)   LDL      <100 mg/dL 98   GFR If African American      >60 mL/min/1.73 m 2 >60   GFR If Non African American      >60 mL/min/1.73 m 2 >60   TSH      0.380 - 5.330 uIU/mL 3.460   Prostatic Specific Antigen Tot      0.00 - 4.00 ng/mL 0.55   Hepatitis C Antibody      Non-Reactive Non-Reactive   Fasting Status       Fasting     Results for orders placed or performed in visit on 11/09/20   POCT Urinalysis   Result Value Ref Range    POC Color Yellow Negative    POC Appearance Clear Negative    POC Leukocyte Esterase Negative Negative    POC Nitrites Negative Negative    POC Urobiligen 0.2 Negative (0.2) mg/dL    POC Protein Negative Negative mg/dL    POC Urine PH 5.0 5.0 - 8.0    POC Blood Negative Negative    POC Specific Gravity 1.030 <1.005 - >1.030    POC Ketones Negative Negative mg/dL    POC Bilirubin Negative Negative mg/dL    POC Glucose Negative Negative mg/dL   Reviewed and discussed above labs with patient in office.        Assessment and Plan:   The following treatment plan was discussed:     1. Dark urine  2. Polyuria  UA abnormalities noted last visit have resolved.  Patient reports persistent polyuria and incomplete emptying of bladder.  He never tried the Flomax so he will give that a shot.  Reports in the past it made him dizzy but that was a while ago.  We will try again follow-up with urologist.  - POCT Urinalysis    3. Elevated LFTs  Improving.  ALT is trending down.  We will recheck again today to see if resolved since  discontinuation of all medications 1 month ago.  - HEPATIC FUNCTION PANEL; Future    4. Generalized abdominal pain  Resolved.    5. Obesity (BMI 30-39.9)  Strongly encourage dietary modification and exercise.  He is using his way back into exercise due to his back.  Back pain is well controlled with epidural.  Under care of physiatry.  - Patient identified as having weight management issue.  Appropriate orders and counseling given.    Health Maintenance: Shingrix due and we are supposed to receive a shipment this week or next.  He will check in with our office on Wednesday.  I placed the order for him to come in for an MA visit once we receive it.    Followup: Return in about 1 year (around 11/9/2021) for annual exam.         This dictation was created using voice recognition software. The accuracy of the dictation is limited to the abilities of the software. I expect there may be some errors of grammar and possibly content.

## 2021-02-03 ENCOUNTER — OFFICE VISIT (OUTPATIENT)
Dept: PHYSICAL MEDICINE AND REHAB | Facility: MEDICAL CENTER | Age: 56
End: 2021-02-03
Payer: COMMERCIAL

## 2021-02-03 VITALS
HEIGHT: 72 IN | BODY MASS INDEX: 32.85 KG/M2 | RESPIRATION RATE: 16 BRPM | OXYGEN SATURATION: 98 % | HEART RATE: 72 BPM | DIASTOLIC BLOOD PRESSURE: 76 MMHG | TEMPERATURE: 98.2 F | SYSTOLIC BLOOD PRESSURE: 130 MMHG | WEIGHT: 242.51 LBS

## 2021-02-03 DIAGNOSIS — M48.061 SPINAL STENOSIS OF LUMBAR REGION, UNSPECIFIED WHETHER NEUROGENIC CLAUDICATION PRESENT: ICD-10-CM

## 2021-02-03 DIAGNOSIS — M79.10 MYALGIA: ICD-10-CM

## 2021-02-03 DIAGNOSIS — M54.16 LUMBAR RADICULOPATHY: ICD-10-CM

## 2021-02-03 DIAGNOSIS — Z98.890 HISTORY OF LUMBOSACRAL SPINE SURGERY: ICD-10-CM

## 2021-02-03 PROCEDURE — 99213 OFFICE O/P EST LOW 20 MIN: CPT | Performed by: PHYSICAL MEDICINE & REHABILITATION

## 2021-02-03 ASSESSMENT — PAIN SCALES - GENERAL: PAINLEVEL: 3=SLIGHT PAIN

## 2021-02-03 ASSESSMENT — PATIENT HEALTH QUESTIONNAIRE - PHQ9: CLINICAL INTERPRETATION OF PHQ2 SCORE: 0

## 2021-02-03 ASSESSMENT — FIBROSIS 4 INDEX: FIB4 SCORE: 0.69

## 2021-02-03 NOTE — PROGRESS NOTES
Follow up patient note  Interventional spine and sports physiatry, Physical medicine rehabilitation      Chief complaint:   Chief Complaint   Patient presents with   • Follow-Up     back pain          HISTORY    Please see new patient note by Dr Larson,  for more details.     HPI  Patient identification: Zachery Esquivel ,  1965,   With Diagnoses of Lumbar radiculopathy, Spinal stenosis of lumbar region, unspecified whether neurogenic claudication present, History of lumbosacral spine surgery L4-5 fusion done by Dr Barrie Espinoza, and Myalgia were pertinent to this visit.     Procedures:  Bilateral L3-4 transforaminal epidural steroid injection with 80% pain relief post procedure    The patient continues to get pain relief after the epidural steroid injection as above.  He will occasionally get back pain which can be up to 5 out of 10 in intensity and can radiate down the legs mostly on the anterior aspect.  This is happening infrequently but is happening occasionally.  He is consistent with his home exercise program.  He stopped going to physical therapy because of concerns of Covid but now he is more interested in restarting physical therapy.       ROS Red Flags :   Fever, Chills, Sweats: Denies  Involuntary Weight Loss: Denies  Bowel/Bladder Incontinence: Denies  Saddle Anesthesia: Denies        PMHx:   Past Medical History:   Diagnosis Date   • Low back pain    • S/P laparoscopic fundoplication        PSHx:   Past Surgical History:   Procedure Laterality Date   • MN NJX AA&/STRD TFRML EPI LUMBAR/SACRAL 1 LEVEL Bilateral 10/12/2020    Procedure: INJECTION, SPINE, LUMBOSACRAL, EPIDURAL, 1 LEVEL, TRANSFORAMINAL APPROACH;  Surgeon: Jack Larson M.D.;  [facility];  Service: Pain Management   • ABDOMINAL EXPLORATION     • APPENDECTOMY     • CHOLECYSTECTOMY     • LUMBAR LAMINECTOMY DISKECTOMY      l4-5 with fusion   • NISSEN FUNDOPLICATION LAPAROSCOPIC         Family history    Family History   Problem Relation Age of Onset   • Diabetes Father    • Diabetes Paternal Grandmother    • Cancer Sister 35        breast   • Heart Disease Neg Hx    • Stroke Neg Hx    • Hypertension Neg Hx    • Hyperlipidemia Neg Hx          Medications:   Outpatient Medications Marked as Taking for the 2/3/21 encounter (Office Visit) with Jack Larson M.D.   Medication Sig Dispense Refill   • tamsulosin (FLOMAX) 0.4 MG capsule Take 0.4 mg by mouth ONE-HALF HOUR AFTER BREAKFAST.          Current Outpatient Medications on File Prior to Visit   Medication Sig Dispense Refill   • tamsulosin (FLOMAX) 0.4 MG capsule Take 0.4 mg by mouth ONE-HALF HOUR AFTER BREAKFAST.       No current facility-administered medications on file prior to visit.          Allergies:   Allergies   Allergen Reactions   • Penicillins Rash     .   • Tylenol      Can't take due to elevated liver enzymes   • Bactrim [Sulfamethoxazole W-Trimethoprim]      Itching rash palms and soles       Social Hx:   Social History     Socioeconomic History   • Marital status:      Spouse name: Not on file   • Number of children: Not on file   • Years of education: Not on file   • Highest education level: Not on file   Occupational History   • Not on file   Social Needs   • Financial resource strain: Not on file   • Food insecurity     Worry: Not on file     Inability: Not on file   • Transportation needs     Medical: Not on file     Non-medical: Not on file   Tobacco Use   • Smoking status: Never Smoker   • Smokeless tobacco: Never Used   Substance and Sexual Activity   • Alcohol use: Yes     Alcohol/week: 1.0 oz     Types: 2 Glasses of wine per week     Frequency: 2-3 times a week     Drinks per session: 1 or 2     Binge frequency: Never     Comment: Occasionally   • Drug use: No   • Sexual activity: Yes     Partners: Female   Lifestyle   • Physical activity     Days per week: Not on file     Minutes per session: Not on file   • Stress: Not on file    Relationships   • Social connections     Talks on phone: Not on file     Gets together: Not on file     Attends Christian service: Not on file     Active member of club or organization: Not on file     Attends meetings of clubs or organizations: Not on file     Relationship status: Not on file   • Intimate partner violence     Fear of current or ex partner: Not on file     Emotionally abused: Not on file     Physically abused: Not on file     Forced sexual activity: Not on file   Other Topics Concern   •  Service No   • Blood Transfusions Yes   • Caffeine Concern No   • Occupational Exposure No   • Hobby Hazards No   • Sleep Concern Yes   • Stress Concern Yes   • Weight Concern No   • Special Diet No   • Back Care Yes     Comment: back brace   • Exercise Yes   • Bike Helmet No   • Seat Belt Yes   • Self-Exams No   Social History Narrative   • Not on file         EXAMINATION     Physical Exam:   Vitals: /76 (BP Location: Right arm, Patient Position: Sitting, BP Cuff Size: Adult)   Pulse 72   Temp 36.8 °C (98.2 °F) (Temporal)   Resp 16   Ht 1.829 m (6')   Wt 110 kg (242 lb 8.1 oz)   SpO2 98%     Constitutional:   Body Habitus: Body mass index is 32.89 kg/m².  Cooperation: Fully cooperates with exam  Appearance: Well-groomed no disheveled    Respiratory-  breathing comfortable on room air, no audible wheezing  Cardiovascular- capillary refills less than 2 seconds. No lower extremity edema is noted.   Psychiatric- alert and oriented ×3. Normal affect.    MSK and Neuro: -    There are no signs of infection around the injection sites.   full  active range of motion with flexion, lateral flexion, and rotation bilaterally.   There is full  active range of motion with lumbar extension.      Palpation:   No tenderness to palpation in midline at T1-T12 levels. No tenderness to palpation in the left and right of the midline T1-L5, NEGATIVE for tenderness to palpation to the para-midline region in the lower  lumbar levels.  palpation over SI joint: negative bilaterally    palpation in hip or over the gluteus medius tendon insertion: negative bilaterally      Lumbar spine Special tests  Neuro tension  Straight leg test negative bilaterally    Slump test negative bilaterally      Key points for the international standards for neurological classification of spinal cord injury (ISNCSCI) to light touch.     Dermatome R L                                      L2 2 2   L3 2 2   L4 2 2   L5 2 2   S1 2 2   S2 2 2         Motor Exam Lower Extremities    ? Myotome R L   Hip flexion L2 5 5   Knee extension L3 5 5   Ankle dorsiflexion L4 5 5   Toe extension L5 5 5   Ankle plantarflexion S1 5 5                 MEDICAL DECISION MAKING    DATA    Labs:   Lab Results   Component Value Date/Time    SODIUM 143 10/14/2020 09:30 AM    POTASSIUM 3.7 10/14/2020 09:30 AM    CHLORIDE 107 10/14/2020 09:30 AM    CO2 25 10/14/2020 09:30 AM    GLUCOSE 81 10/14/2020 09:30 AM    BUN 19 10/14/2020 09:30 AM    CREATININE 0.97 10/14/2020 09:30 AM        No results found for: PROTHROMBTM, INR     Lab Results   Component Value Date/Time    WBC 9.3 10/14/2020 09:30 AM    RBC 5.05 10/14/2020 09:30 AM    HEMOGLOBIN 15.1 10/14/2020 09:30 AM    HEMATOCRIT 46.8 10/14/2020 09:30 AM    MCV 92.7 10/14/2020 09:30 AM    MCH 29.9 10/14/2020 09:30 AM    MCHC 32.3 (L) 10/14/2020 09:30 AM    MPV 10.4 10/14/2020 09:30 AM    NEUTSPOLYS 63.80 10/14/2020 09:30 AM    LYMPHOCYTES 25.30 10/14/2020 09:30 AM    MONOCYTES 8.30 10/14/2020 09:30 AM    EOSINOPHILS 1.40 10/14/2020 09:30 AM    BASOPHILS 0.60 10/14/2020 09:30 AM        Lab Results   Component Value Date/Time    HBA1C 5.9 (A) 10/13/2020 11:45 AM          Imaging:   I personally reviewed following images    MRI lumbar spine 9/27/2020  At L5-S1 there is a disc protrusion.  This is causing mild to moderate central canal stenosis and moderate bilateral stenosis right worse than left.  History of L4-5 posterior fusion.  At  L3-4 there is moderate to severe central canal stenosis and bilateral neuroforaminal stenosis.  Facet arthropathy at L3-4 and L5-S1.  There are no acute fractures.    I reviewed the fluoroscopic images from 10/12/2020 showing successful bilateral L3-4 transforaminal epidural steroid injection.  I reviewed these with the patient on 11/5/2020.    I reviewed the following radiology reports                                        Results for orders placed during the hospital encounter of 09/27/20   MR-LUMBAR SPINE-W/O    Impression 1.  L3-4 mild-moderate spinal stenosis    2.  L5-S1 left paracentral disc extrusion moderately narrowing the left lateral recess and foramen    3.  L4-5 postoperative changes appear within normal limits                Results for orders placed during the hospital encounter of 09/27/20   MR-LUMBAR SPINE-W/O    Impression 1.  L3-4 mild-moderate spinal stenosis    2.  L5-S1 left paracentral disc extrusion moderately narrowing the left lateral recess and foramen    3.  L4-5 postoperative changes appear within normal limits                                                                                                                                                                                                        Results for orders placed during the hospital encounter of 10/15/20   DX-LUMBAR SPINE-4+ VIEWS    Impression 1.  No compression deformity or acute fracture is identified.    2.  Degenerative disc disease and facet arthropathy is most prominent in the lower lumbar spine. Posterior fusion is noted at L4-L5.    3.  No focal instability is noted on flexion extension views.                                         DIAGNOSIS   Visit Diagnoses     ICD-10-CM   1. Lumbar radiculopathy  M54.16   2. Spinal stenosis of lumbar region, unspecified whether neurogenic claudication present  M48.061   3. History of lumbosacral spine surgery L4-5 fusion done by Dr Barrie Espinoza  Z98.890   4. Myalgia  M79.10          ASSESSMENT and PLAN:     Zachery Esquivel  1965 male      Zachery was seen today for follow-up.    Diagnoses and all orders for this visit:    Lumbar radiculopathy  -     REFERRAL TO PHYSICAL THERAPY    Spinal stenosis of lumbar region, unspecified whether neurogenic claudication present  -     REFERRAL TO PHYSICAL THERAPY    History of lumbosacral spine surgery L4-5 fusion done by Dr Barrie Espinoza  -     REFERRAL TO PHYSICAL THERAPY    Myalgia  -     REFERRAL TO PHYSICAL THERAPY       He can use Tylenol 1000 mg up to 3 times daily.    I referred the patient to physical therapy.     If the patient has a recurrence of the severe radicular pain that I would consider him for a repeat L3-4 bilateral transforaminal epidural steroid injection    Follow up: After physical therapy    Thank you for allowing me to participate in the care of this patient. If you have any questions please not hesitate to contact me.             Please note that this dictation was created using voice recognition software. I have made every reasonable attempt to correct obvious errors but there may be errors of grammar and content that I may have overlooked prior to finalization of this note.      Jack Larson MD  Interventional Spine and Sports Physiatry  Physical Medicine and Rehabilitation  [facility]

## 2021-03-15 DIAGNOSIS — Z23 NEED FOR VACCINATION: ICD-10-CM

## 2021-05-05 ENCOUNTER — OFFICE VISIT (OUTPATIENT)
Dept: PHYSICAL MEDICINE AND REHAB | Facility: MEDICAL CENTER | Age: 56
End: 2021-05-05
Payer: COMMERCIAL

## 2021-05-05 VITALS
BODY MASS INDEX: 32.31 KG/M2 | TEMPERATURE: 98 F | OXYGEN SATURATION: 92 % | DIASTOLIC BLOOD PRESSURE: 80 MMHG | WEIGHT: 238.54 LBS | HEART RATE: 75 BPM | HEIGHT: 72 IN | SYSTOLIC BLOOD PRESSURE: 118 MMHG

## 2021-05-05 DIAGNOSIS — M48.061 SPINAL STENOSIS OF LUMBAR REGION, UNSPECIFIED WHETHER NEUROGENIC CLAUDICATION PRESENT: ICD-10-CM

## 2021-05-05 DIAGNOSIS — M54.16 LUMBAR RADICULOPATHY: ICD-10-CM

## 2021-05-05 DIAGNOSIS — Z98.890 HISTORY OF LUMBOSACRAL SPINE SURGERY: ICD-10-CM

## 2021-05-05 PROCEDURE — 99214 OFFICE O/P EST MOD 30 MIN: CPT | Performed by: PHYSICAL MEDICINE & REHABILITATION

## 2021-05-05 ASSESSMENT — FIBROSIS 4 INDEX: FIB4 SCORE: 0.69

## 2021-05-05 ASSESSMENT — PAIN SCALES - GENERAL: PAINLEVEL: 6=MODERATE PAIN

## 2021-05-05 ASSESSMENT — PATIENT HEALTH QUESTIONNAIRE - PHQ9: CLINICAL INTERPRETATION OF PHQ2 SCORE: 0

## 2021-05-05 NOTE — PROGRESS NOTES
Follow up patient note  Interventional spine and sports physiatry, Physical medicine rehabilitation      Chief complaint:   Chief Complaint   Patient presents with   • Follow-Up     Back pain         HISTORY    Please see new patient note by Dr Larson,  for more details.     HPI  Patient identification: Zachery Esquivel ,  1965,   With Diagnoses of Lumbar radiculopathy, Spinal stenosis of lumbar region, unspecified whether neurogenic claudication present, and History of lumbosacral spine surgery L4-5 fusion done by Dr Barrie Espinoza were pertinent to this visit.     Procedures:  Bilateral L3-4 transforaminal epidural steroid injection with 80% pain relief post procedure    Medications tried oxycodone, Zanaflex, Flexeril, Medrol Dosepak, diclofenac, Robaxin     Had elevated LFT on tylenol in the past.        The patient has been doing an extensive home exercise program under the direction of physical therapy including strengthing, stretching, cardiovascular exercise. He does this daily including the past two months.     He has had worsening of his pain over the past two months and now the pain is 7/10 in intensity, worse with sitting, intermittently radiating down the bilateral legs, anterior aspect towards the knee. He can sit in his car or an office chair only for 30 minutes and after this he has to stand up.          ROS Red Flags :   Fever, Chills, Sweats: Denies  Involuntary Weight Loss: Denies  Bowel/Bladder Incontinence: Denies  Saddle Anesthesia: Denies        PMHx:   Past Medical History:   Diagnosis Date   • Low back pain    • S/P laparoscopic fundoplication        PSHx:   Past Surgical History:   Procedure Laterality Date   • [place] NJX AA&/STRD TFRML EPI LUMBAR/SACRAL 1 LEVEL Bilateral 10/12/2020    Procedure: INJECTION, SPINE, LUMBOSACRAL, EPIDURAL, 1 LEVEL, TRANSFORAMINAL APPROACH;  Surgeon: Jack Larson M.D.;  [facility];  Service: Pain Management   • ABDOMINAL  EXPLORATION     • APPENDECTOMY     • CHOLECYSTECTOMY     • LUMBAR LAMINECTOMY DISKECTOMY      l4-5 with fusion   • NISSEN FUNDOPLICATION LAPAROSCOPIC         Family history   Family History   Problem Relation Age of Onset   • Diabetes Father    • Diabetes Paternal Grandmother    • Cancer Sister 35        breast   • Heart Disease Neg Hx    • Stroke Neg Hx    • Hypertension Neg Hx    • Hyperlipidemia Neg Hx          Medications:   No outpatient medications have been marked as taking for the 5/5/21 encounter (Office Visit) with Jack Larson M.D..        Current Outpatient Medications on File Prior to Visit   Medication Sig Dispense Refill   • tamsulosin (FLOMAX) 0.4 MG capsule Take 0.4 mg by mouth ONE-HALF HOUR AFTER BREAKFAST.       No current facility-administered medications on file prior to visit.         Allergies:   Allergies   Allergen Reactions   • Penicillins Rash     .   • Tylenol      Can't take due to elevated liver enzymes   • Bactrim [Sulfamethoxazole W-Trimethoprim]      Itching rash palms and soles       Social Hx:   Social History     Socioeconomic History   • Marital status:      Spouse name: Not on file   • Number of children: Not on file   • Years of education: Not on file   • Highest education level: Not on file   Occupational History   • Not on file   Tobacco Use   • Smoking status: Never Smoker   • Smokeless tobacco: Never Used   Substance and Sexual Activity   • Alcohol use: Yes     Alcohol/week: 1.0 oz     Types: 2 Glasses of wine per week     Comment: Occasionally   • Drug use: No   • Sexual activity: Yes     Partners: Female   Other Topics Concern   •  Service No   • Blood Transfusions Yes   • Caffeine Concern No   • Occupational Exposure No   • Hobby Hazards No   • Sleep Concern Yes   • Stress Concern Yes   • Weight Concern No   • Special Diet No   • Back Care Yes     Comment: back brace   • Exercise Yes   • Bike Helmet No   • Seat Belt Yes   • Self-Exams No   Social  History Narrative   • Not on file     Social Determinants of Health     Financial Resource Strain:    • Difficulty of Paying Living Expenses:    Food Insecurity:    • Worried About Running Out of Food in the Last Year:    • Ran Out of Food in the Last Year:    Transportation Needs:    • Lack of Transportation (Medical):    • Lack of Transportation (Non-Medical):    Physical Activity:    • Days of Exercise per Week:    • Minutes of Exercise per Session:    Stress:    • Feeling of Stress :    Social Connections:    • Frequency of Communication with Friends and Family:    • Frequency of Social Gatherings with Friends and Family:    • Attends Shinto Services:    • Active Member of Clubs or Organizations:    • Attends Club or Organization Meetings:    • Marital Status:    Intimate Partner Violence:    • Fear of Current or Ex-Partner:    • Emotionally Abused:    • Physically Abused:    • Sexually Abused:          EXAMINATION     Physical Exam:   Vitals: /80 (BP Location: Right arm, Patient Position: Sitting, BP Cuff Size: Adult)   Pulse 75   Temp 36.7 °C (98 °F) (Temporal)   Ht 1.829 m (6')   Wt 108 kg (238 lb 8.6 oz)   SpO2 92%     Constitutional:   Body Habitus: Body mass index is 32.35 kg/m².  Cooperation: Fully cooperates with exam  Appearance: Well-groomed no disheveled    Respiratory-  breathing comfortable on room air, no audible wheezing  Cardiovascular- capillary refills less than 2 seconds. No lower extremity edema is noted.   Psychiatric- alert and oriented ×3. Normal affect.    MSK and Neuro: -    There are no signs of infection around the injection sites.   decreased active range of motion with flexion, lateral flexion, and rotation bilaterally.   There is decreased active range of motion with lumbar extension.    There is  pain with lumbar extension.   There is pain with facet loading maneuver (extension rotation) with axial low back pain on the BILATERAL side(s)    Palpation:   No tenderness  to palpation in midline at T1-T12 levels. No tenderness to palpation in the left and right of the midline T1-L5, NEGATIVE for tenderness to palpation to the para-midline region in the lower lumbar levels.  palpation over SI joint: negative bilaterally    palpation in hip or over the gluteus medius tendon insertion: negative bilaterally      Lumbar spine Special tests  Neuro tension  Straight leg test negative bilaterally    Slump test negative bilaterally    Femoral stretch test positive bilaterally for radiculopathy    Key points for the international standards for neurological classification of spinal cord injury (ISNCSCI) to light touch.     Dermatome R L                                      L2 2 2   L3 2 2   L4 2 2   L5 2 2   S1 2 2   S2 2 2         Motor Exam Lower Extremities    ? Myotome R L   Hip flexion L2 5 5   Knee extension L3 5 5   Ankle dorsiflexion L4 5 5   Toe extension L5 5 5   Ankle plantarflexion S1 5 5                 MEDICAL DECISION MAKING    DATA    Labs:   Lab Results   Component Value Date/Time    SODIUM 143 10/14/2020 09:30 AM    POTASSIUM 3.7 10/14/2020 09:30 AM    CHLORIDE 107 10/14/2020 09:30 AM    CO2 25 10/14/2020 09:30 AM    GLUCOSE 81 10/14/2020 09:30 AM    BUN 19 10/14/2020 09:30 AM    CREATININE 0.97 10/14/2020 09:30 AM        No results found for: PROTHROMBTM, INR     Lab Results   Component Value Date/Time    WBC 9.3 10/14/2020 09:30 AM    RBC 5.05 10/14/2020 09:30 AM    HEMOGLOBIN 15.1 10/14/2020 09:30 AM    HEMATOCRIT 46.8 10/14/2020 09:30 AM    MCV 92.7 10/14/2020 09:30 AM    MCH 29.9 10/14/2020 09:30 AM    MCHC 32.3 (L) 10/14/2020 09:30 AM    MPV 10.4 10/14/2020 09:30 AM    NEUTSPOLYS 63.80 10/14/2020 09:30 AM    LYMPHOCYTES 25.30 10/14/2020 09:30 AM    MONOCYTES 8.30 10/14/2020 09:30 AM    EOSINOPHILS 1.40 10/14/2020 09:30 AM    BASOPHILS 0.60 10/14/2020 09:30 AM        Lab Results   Component Value Date/Time    HBA1C 5.9 (A) 10/13/2020 11:45 AM          Imaging:   I  personally reviewed following images    MRI lumbar spine 9/27/2020  At L5-S1 there is a disc protrusion.  This is causing mild to moderate central canal stenosis and moderate bilateral stenosis right worse than left.  History of L4-5 posterior fusion.  At L3-4 there is moderate to severe central canal stenosis and bilateral neuroforaminal stenosis.  Facet arthropathy at L3-4 and L5-S1.  There are no acute fractures.    I reviewed the fluoroscopic images from 10/12/2020 showing successful bilateral L3-4 transforaminal epidural steroid injection.  I reviewed these with the patient on 11/5/2020.    I reviewed the following radiology reports                                        Results for orders placed during the hospital encounter of 09/27/20   MR-LUMBAR SPINE-W/O    Impression 1.  L3-4 mild-moderate spinal stenosis    2.  L5-S1 left paracentral disc extrusion moderately narrowing the left lateral recess and foramen    3.  L4-5 postoperative changes appear within normal limits                Results for orders placed during the hospital encounter of 09/27/20   MR-LUMBAR SPINE-W/O    Impression 1.  L3-4 mild-moderate spinal stenosis    2.  L5-S1 left paracentral disc extrusion moderately narrowing the left lateral recess and foramen    3.  L4-5 postoperative changes appear within normal limits                                                                                                                                                                                                        Results for orders placed during the hospital encounter of 10/15/20   DX-LUMBAR SPINE-4+ VIEWS    Impression 1.  No compression deformity or acute fracture is identified.    2.  Degenerative disc disease and facet arthropathy is most prominent in the lower lumbar spine. Posterior fusion is noted at L4-L5.    3.  No focal instability is noted on flexion extension views.                                         DIAGNOSIS   Visit  Diagnoses     ICD-10-CM   1. Lumbar radiculopathy  M54.16   2. Spinal stenosis of lumbar region, unspecified whether neurogenic claudication present  M48.061   3. History of lumbosacral spine surgery L4-5 fusion done by Dr Barrie Espinoza  [id number]         ASSESSMENT and PLAN:     Zachery Esquivel  1965 male      Zachery was seen today for follow-up.    Diagnoses and all orders for this visit:    Lumbar radiculopathy  -     REFERRAL TO PHYSICAL MEDICINE REHAB    Spinal stenosis of lumbar region, unspecified whether neurogenic claudication present    History of lumbosacral spine surgery L4-5 fusion done by Dr Barrie Espinoza           The patient is had a recurrence of his lumbar radiculopathy likely from the L3 nerve as before.  He has continued his home exercise program and he has been diligent with this.  He has tried medication management.  After the previous injection he had greater than 80% pain relief for several months and significant functional improvement.  Now this impaired function with impaired sitting tolerance has recurred.     I have ordered a bilateral L3-4 transforaminal epidural steroid injection    The risks benefits and alternatives to this procedure were discussed and the patient wishes to proceed with the procedure. Risks include but are not limited to damage to surrounding structures, infection, bleeding, worsening of pain which can be permanent, weakness which can be permanent. Benefits include pain relief, improved function. Alternatives includes not doing the procedure.      Also on the differential is facet mediated pain superimposed on his lumbar radiculopathy.  I would consider the patient for medial branch blocks if his back pain remains after the epidural    Medications: The patient can use gabapentin 300 to 600 mg as needed during flares of pain    Follow up: After the above procedure    Thank you for allowing me to participate in the care of this patient. If you have any questions  please not hesitate to contact me.             Please note that this dictation was created using voice recognition software. I have made every reasonable attempt to correct obvious errors but there may be errors of grammar and content that I may have overlooked prior to finalization of this note.      Jack Larson MD  Interventional Spine and Sports Physiatry  Physical Medicine and Rehabilitation  [facility]

## 2021-06-14 ENCOUNTER — TELEPHONE (OUTPATIENT)
Dept: PHYSICAL MEDICINE AND REHAB | Facility: MEDICAL CENTER | Age: 56
End: 2021-06-14

## 2021-06-21 ENCOUNTER — APPOINTMENT (OUTPATIENT)
Dept: RADIOLOGY | Facility: REHABILITATION | Age: 56
End: 2021-06-21
Attending: PHYSICAL MEDICINE & REHABILITATION
Payer: COMMERCIAL

## 2021-06-21 ENCOUNTER — HOSPITAL ENCOUNTER (OUTPATIENT)
Facility: REHABILITATION | Age: 56
End: 2021-06-21
Attending: PHYSICAL MEDICINE & REHABILITATION | Admitting: PHYSICAL MEDICINE & REHABILITATION
Payer: COMMERCIAL

## 2021-06-21 VITALS
OXYGEN SATURATION: 98 % | TEMPERATURE: 98.1 F | BODY MASS INDEX: 32.25 KG/M2 | WEIGHT: 238.1 LBS | SYSTOLIC BLOOD PRESSURE: 127 MMHG | RESPIRATION RATE: 18 BRPM | DIASTOLIC BLOOD PRESSURE: 96 MMHG | HEIGHT: 72 IN | HEART RATE: 61 BPM

## 2021-06-21 PROCEDURE — 700111 HCHG RX REV CODE 636 W/ 250 OVERRIDE (IP)

## 2021-06-21 PROCEDURE — 700117 HCHG RX CONTRAST REV CODE 255

## 2021-06-21 PROCEDURE — 64483 NJX AA&/STRD TFRM EPI L/S 1: CPT

## 2021-06-21 RX ORDER — DEXAMETHASONE SODIUM PHOSPHATE 10 MG/ML
INJECTION, SOLUTION INTRAMUSCULAR; INTRAVENOUS
Status: COMPLETED
Start: 2021-06-21 | End: 2021-06-21

## 2021-06-21 RX ORDER — LIDOCAINE HYDROCHLORIDE 10 MG/ML
INJECTION, SOLUTION EPIDURAL; INFILTRATION; INTRACAUDAL; PERINEURAL
Status: COMPLETED
Start: 2021-06-21 | End: 2021-06-21

## 2021-06-21 RX ADMIN — IOHEXOL 5 ML: 240 INJECTION, SOLUTION INTRATHECAL; INTRAVASCULAR; INTRAVENOUS; ORAL at 09:14

## 2021-06-21 RX ADMIN — LIDOCAINE HYDROCHLORIDE 10 ML: 10 INJECTION, SOLUTION EPIDURAL; INFILTRATION; INTRACAUDAL; PERINEURAL at 09:12

## 2021-06-21 RX ADMIN — DEXAMETHASONE SODIUM PHOSPHATE 20 MG: 10 INJECTION, SOLUTION INTRAMUSCULAR; INTRAVENOUS at 09:16

## 2021-06-21 ASSESSMENT — FIBROSIS 4 INDEX: FIB4 SCORE: 0.69

## 2021-06-21 ASSESSMENT — PAIN DESCRIPTION - PAIN TYPE
TYPE: CHRONIC PAIN
TYPE: CHRONIC PAIN

## 2021-06-21 NOTE — NON-PROVIDER
Onto procedure table  on prone positioned and necessary monitoring equipment connected ( pulse oximeter, BP ) with the help by  team  ( CST, X-ray TECH and RN ). Hands supported with stool underneath headrest of the bed , lower extremities supported with pillow.

## 2021-06-21 NOTE — NON-PROVIDER
Identified patient, medication allergies, site and procedure confirmed and keenan by Dr. Larson . With unremarkable medical health history.Timed out agreed by team and patient..

## 2021-06-21 NOTE — H&P
Physical medicine and rehabilitation preprocedure history & Physical Note    Date  6/21/2021    Primary Care Physician  MARIAJOSE Garcia  Pre-Op Diagnosis Codes:     * Lumbar radiculopathy [M54.16]     HPI  This is a 55 y.o. male who presented with chronic low back pain aching quality moderate severe intensity with functional deficits failed conservative treatments home exercise program medication management.  Previously did very well after epidural steroid injection with significant functional improvement for many months and the pain has returned here for repeat epidural steroid injection.    See previous notes of Dr. Larson    Past Medical History:   Diagnosis Date   • Low back pain    • S/P laparoscopic fundoplication        Past Surgical History:   Procedure Laterality Date   • [place] NJX AA&/STRD TFRML EPI LUMBAR/SACRAL 1 LEVEL Bilateral 10/12/2020    Procedure: INJECTION, SPINE, LUMBOSACRAL, EPIDURAL, 1 LEVEL, TRANSFORAMINAL APPROACH;  Surgeon: Jack Larson M.D.;  Location[facility];  Service: Pain Management   • ABDOMINAL EXPLORATION     • APPENDECTOMY     • CHOLECYSTECTOMY     • LUMBAR LAMINECTOMY DISKECTOMY      l4-5 with fusion   • NISSEN FUNDOPLICATION LAPAROSCOPIC         Current Facility-Administered Medications   Medication Dose Route Frequency Provider Last Rate Last Admin   • DEXAMETHASONE SOD PHOSPHATE PF 10 MG/ML INJ SOLN            • IOHEXOL 240 MG/ML INJ SOLN            • LIDOCAINE HCL (PF) 1 % INJ SOLN                Social History     Socioeconomic History   • Marital status:      Spouse name: Not on file   • Number of children: Not on file   • Years of education: Not on file   • Highest education level: Not on file   Occupational History   • Not on file   Tobacco Use   • Smoking status: Never Smoker   • Smokeless tobacco: Never Used   Vaping Use   • Vaping Use: Never used   Substance and Sexual Activity   • Alcohol use: Yes     Alcohol/week: 1.0 oz      Types: 2 Glasses of wine per week     Comment: Occasionally   • Drug use: No   • Sexual activity: Yes     Partners: Female   Other Topics Concern   •  Service No   • Blood Transfusions Yes   • Caffeine Concern No   • Occupational Exposure No   • Hobby Hazards No   • Sleep Concern Yes   • Stress Concern Yes   • Weight Concern No   • Special Diet No   • Back Care Yes     Comment: back brace   • Exercise Yes   • Bike Helmet No   • Seat Belt Yes   • Self-Exams No   Social History Narrative   • Not on file     Social Determinants of Health     Financial Resource Strain:    • Difficulty of Paying Living Expenses:    Food Insecurity:    • Worried About Running Out of Food in the Last Year:    • Ran Out of Food in the Last Year:    Transportation Needs:    • Lack of Transportation (Medical):    • Lack of Transportation (Non-Medical):    Physical Activity:    • Days of Exercise per Week:    • Minutes of Exercise per Session:    Stress:    • Feeling of Stress :    Social Connections:    • Frequency of Communication with Friends and Family:    • Frequency of Social Gatherings with Friends and Family:    • Attends Jew Services:    • Active Member of Clubs or Organizations:    • Attends Club or Organization Meetings:    • Marital Status:    Intimate Partner Violence:    • Fear of Current or Ex-Partner:    • Emotionally Abused:    • Physically Abused:    • Sexually Abused:        Family History   Problem Relation Age of Onset   • Diabetes Father    • Diabetes Paternal Grandmother    • Cancer Sister 35        breast   • Heart Disease Neg Hx    • Stroke Neg Hx    • Hypertension Neg Hx    • Hyperlipidemia Neg Hx        Allergies  Penicillins, Tylenol, and Bactrim [sulfamethoxazole w-trimethoprim]    Review of Systems  Comprehensive review of systems was negative       Physical Exam  Constitutional:       General: He is not in acute distress.     Appearance: Normal appearance. He is well-developed. He is not  diaphoretic.   Cardiovascular:      Rate and Rhythm: Normal rate.   Pulmonary:      Effort: Pulmonary effort is normal.      Breath sounds: Normal breath sounds.   Abdominal:      Palpations: Abdomen is soft.   Skin:     General: Skin is warm and dry.      Capillary Refill: Capillary refill takes less than 2 seconds.   Neurological:      Mental Status: He is alert and oriented to person, place, and time.   Psychiatric:         Behavior: Behavior normal.         Thought Content: Thought content normal.         Judgment: Judgment normal.          Vital Signs                        Vitals reviewed    Labs:                    Radiology:  DX-PORTABLE FLUOROSCOPY < 1 HOUR    (Results Pending)         Assessment/Plan:  Pre-Op Diagnosis Codes:     * Lumbar radiculopathy [M54.16]  Procedure(s):  BILATERAL L3-4 transforaminal epidural steroid injection with fluoroscopic guidance

## 2021-06-21 NOTE — PROGRESS NOTES
Admitting health assessment, healthy.  Current medications reviewed with patient, see medication reconciliation form.  Patient denies taking aspirin, NSAIDS or blood thinners.  Patient has a ride post procedure.  Printed and verbal discharge instructions given with patient understanding.

## 2021-06-21 NOTE — OP REPORT
Date of Service: 6/21/2021     Patient: Zachery Esquivel 55 y.o. male     MRN: [id number]     Physician/s: Jack Larson MD    Pre-operative Diagnosis: Lumbar radiculopathy    Post-operative Diagnosis: Lumbar radiculopathy    Procedure: bilateral  Lumbar Transforaminal Epidural Steroid  at the L3-4 levels.     Description of procedure:    The risks, benefits, and alternatives of the procedure were reviewed and discussed with the patient.  Written informed consent was freely obtained. A pre-procedural time-out was conducted by the physician verifying patient’s identity, procedure to be performed, procedure site and side, and allergy verification. Appropriate equipment was determined to be in place for the procedure.         The patient's vital signs were carefully monitored before, throughout, and after the procedure.     In the fluoroscopy suite the patient was placed in a prone position, a pillow placed underneath their umbilicus. The skin was prepped and draped in the usual sterile fashion.     The fluoroscope was placed over the lumbar spine and adjusted into the proper AP/Oblique view to enter the transforaminal space just adjacent to the pedicle at the levels below. The targets for injection were then marked at the right L3-4. A 27g 1.5 inch needle was placed into the marked site, and 1mL of 1% Lidocaine was injected subcutaneously into the epidermal and dermal layers. The needle was removed intact.  A 22g 5 inch spinal needle was then placed and advanced under fluoroscopic guidance in an oblique view towards the epidural space of the levels noted above. The needle position was confirmed to not be past the 6 o'clock position in the AP view and it was in the neuroforamen in the lateral view.        The fluoroscope was was then adjusted over the lumbar spine and adjusted into the proper AP/Oblique view to enter the transforaminal space adjacent to the pedicle at the LEFT  L3-4. A 27g 1.5 inch needle was  placed into the marked site, and 1mL of 1% Lidocaine was injected subcutaneously into the epidermal and dermal layers. The needle was removed intact.  A 22g 5 inch spinal needle was then placed and advanced under fluoroscopic guidance in an oblique view towards the epidural space of the levels noted above. The needle position was confirmed to not be past the 6 o'clock position in the AP view and it was in the neuroforamen in the lateral view.       Under live fluoroscopic guidance in the AP view, contrast dye was used to highlight the epidural space spread of each level above. Final fluoroscopic images were saved.  Following negative aspiration, 1mL of 1% lidocaine preservative free with 10 mg of dexamethasone was then injected at each level above, and the needles were removed intact after restyleted. The patient's back was covered with a 4x4 gauze, the area was cleansed with sterile normal saline, and a dressing was applied. There were no complications noted.     The patient was then evaluated post-procedure, and was hemodynamically stable prior to leaving the post-operative care unit.     Follow-up as scheduled    Jack Larson MD  Physical Medicine and Rehabilitation  Interventional Spine and Sports Physiatry  [facility]          CPT codes  Transforaminal epidural injection- lumbar or sacral (first level):  61016-07

## 2021-06-22 ENCOUNTER — TELEPHONE (OUTPATIENT)
Dept: PHYSICAL MEDICINE AND REHAB | Facility: MEDICAL CENTER | Age: 56
End: 2021-06-22

## 2021-06-22 NOTE — TELEPHONE ENCOUNTER
MARCM to call us back in regards to his SP that was done with Dr. Larson dated 6/21/21 for his BILATEAL L3-4 transforaminal epidural steroid injection with fluoroscopic guidance.

## 2022-06-07 ENCOUNTER — HOSPITAL ENCOUNTER (OUTPATIENT)
Dept: LAB | Facility: MEDICAL CENTER | Age: 57
End: 2022-06-07
Attending: PHYSICIAN ASSISTANT
Payer: COMMERCIAL

## 2022-06-07 ENCOUNTER — OFFICE VISIT (OUTPATIENT)
Dept: MEDICAL GROUP | Age: 57
End: 2022-06-07
Payer: COMMERCIAL

## 2022-06-07 VITALS
DIASTOLIC BLOOD PRESSURE: 66 MMHG | WEIGHT: 230 LBS | TEMPERATURE: 97.2 F | HEIGHT: 72 IN | SYSTOLIC BLOOD PRESSURE: 110 MMHG | OXYGEN SATURATION: 96 % | BODY MASS INDEX: 31.15 KG/M2 | HEART RATE: 66 BPM

## 2022-06-07 DIAGNOSIS — R73.01 IFG (IMPAIRED FASTING GLUCOSE): ICD-10-CM

## 2022-06-07 DIAGNOSIS — E78.6 LOW HDL (UNDER 40): ICD-10-CM

## 2022-06-07 DIAGNOSIS — Z87.19 HX OF PANCREATITIS: ICD-10-CM

## 2022-06-07 DIAGNOSIS — Z12.12 SCREENING FOR COLORECTAL CANCER: ICD-10-CM

## 2022-06-07 DIAGNOSIS — M54.50 CHRONIC BILATERAL LOW BACK PAIN WITHOUT SCIATICA: ICD-10-CM

## 2022-06-07 DIAGNOSIS — Z12.11 SCREENING FOR COLORECTAL CANCER: ICD-10-CM

## 2022-06-07 DIAGNOSIS — Z12.5 SCREENING FOR PROSTATE CANCER: ICD-10-CM

## 2022-06-07 DIAGNOSIS — E66.9 OBESITY (BMI 30-39.9): ICD-10-CM

## 2022-06-07 DIAGNOSIS — G89.29 CHRONIC BILATERAL LOW BACK PAIN WITHOUT SCIATICA: ICD-10-CM

## 2022-06-07 PROBLEM — M54.40 ACUTE LOW BACK PAIN WITH SCIATICA: Status: RESOLVED | Noted: 2020-09-27 | Resolved: 2022-06-07

## 2022-06-07 LAB
ALBUMIN SERPL BCP-MCNC: 4.6 G/DL (ref 3.2–4.9)
ALBUMIN/GLOB SERPL: 1.8 G/DL
ALP SERPL-CCNC: 65 U/L (ref 30–99)
ALT SERPL-CCNC: 28 U/L (ref 2–50)
ANION GAP SERPL CALC-SCNC: 10 MMOL/L (ref 7–16)
AST SERPL-CCNC: 18 U/L (ref 12–45)
BASOPHILS # BLD AUTO: 0.9 % (ref 0–1.8)
BASOPHILS # BLD: 0.06 K/UL (ref 0–0.12)
BILIRUB SERPL-MCNC: 1 MG/DL (ref 0.1–1.5)
BUN SERPL-MCNC: 19 MG/DL (ref 8–22)
CALCIUM SERPL-MCNC: 9.2 MG/DL (ref 8.5–10.5)
CHLORIDE SERPL-SCNC: 108 MMOL/L (ref 96–112)
CHOLEST SERPL-MCNC: 179 MG/DL (ref 100–199)
CO2 SERPL-SCNC: 25 MMOL/L (ref 20–33)
CREAT SERPL-MCNC: 1.09 MG/DL (ref 0.5–1.4)
EOSINOPHIL # BLD AUTO: 0.32 K/UL (ref 0–0.51)
EOSINOPHIL NFR BLD: 4.9 % (ref 0–6.9)
ERYTHROCYTE [DISTWIDTH] IN BLOOD BY AUTOMATED COUNT: 43 FL (ref 35.9–50)
EST. AVERAGE GLUCOSE BLD GHB EST-MCNC: 114 MG/DL
FASTING STATUS PATIENT QL REPORTED: NORMAL
GFR SERPLBLD CREATININE-BSD FMLA CKD-EPI: 79 ML/MIN/1.73 M 2
GLOBULIN SER CALC-MCNC: 2.5 G/DL (ref 1.9–3.5)
GLUCOSE SERPL-MCNC: 116 MG/DL (ref 65–99)
HBA1C MFR BLD: 5.6 % (ref 4–5.6)
HCT VFR BLD AUTO: 51.1 % (ref 42–52)
HDLC SERPL-MCNC: 43 MG/DL
HGB BLD-MCNC: 16.9 G/DL (ref 14–18)
IMM GRANULOCYTES # BLD AUTO: 0.02 K/UL (ref 0–0.11)
IMM GRANULOCYTES NFR BLD AUTO: 0.3 % (ref 0–0.9)
LDLC SERPL CALC-MCNC: 112 MG/DL
LYMPHOCYTES # BLD AUTO: 1.49 K/UL (ref 1–4.8)
LYMPHOCYTES NFR BLD: 22.9 % (ref 22–41)
MCH RBC QN AUTO: 30.5 PG (ref 27–33)
MCHC RBC AUTO-ENTMCNC: 33.1 G/DL (ref 33.7–35.3)
MCV RBC AUTO: 92.1 FL (ref 81.4–97.8)
MONOCYTES # BLD AUTO: 0.68 K/UL (ref 0–0.85)
MONOCYTES NFR BLD AUTO: 10.5 % (ref 0–13.4)
NEUTROPHILS # BLD AUTO: 3.93 K/UL (ref 1.82–7.42)
NEUTROPHILS NFR BLD: 60.5 % (ref 44–72)
NRBC # BLD AUTO: 0 K/UL
NRBC BLD-RTO: 0 /100 WBC
PLATELET # BLD AUTO: 217 K/UL (ref 164–446)
PMV BLD AUTO: 10.5 FL (ref 9–12.9)
POTASSIUM SERPL-SCNC: 4.4 MMOL/L (ref 3.6–5.5)
PROT SERPL-MCNC: 7.1 G/DL (ref 6–8.2)
PSA SERPL-MCNC: 1.04 NG/ML (ref 0–4)
RBC # BLD AUTO: 5.55 M/UL (ref 4.7–6.1)
SODIUM SERPL-SCNC: 143 MMOL/L (ref 135–145)
TRIGL SERPL-MCNC: 122 MG/DL (ref 0–149)
TSH SERPL DL<=0.005 MIU/L-ACNC: 4.33 UIU/ML (ref 0.38–5.33)
WBC # BLD AUTO: 6.5 K/UL (ref 4.8–10.8)

## 2022-06-07 PROCEDURE — 84443 ASSAY THYROID STIM HORMONE: CPT

## 2022-06-07 PROCEDURE — 84153 ASSAY OF PSA TOTAL: CPT

## 2022-06-07 PROCEDURE — 85025 COMPLETE CBC W/AUTO DIFF WBC: CPT

## 2022-06-07 PROCEDURE — 80061 LIPID PANEL: CPT

## 2022-06-07 PROCEDURE — 83036 HEMOGLOBIN GLYCOSYLATED A1C: CPT

## 2022-06-07 PROCEDURE — 80053 COMPREHEN METABOLIC PANEL: CPT

## 2022-06-07 PROCEDURE — 99214 OFFICE O/P EST MOD 30 MIN: CPT | Performed by: PHYSICIAN ASSISTANT

## 2022-06-07 PROCEDURE — 36415 COLL VENOUS BLD VENIPUNCTURE: CPT

## 2022-06-07 ASSESSMENT — PATIENT HEALTH QUESTIONNAIRE - PHQ9: CLINICAL INTERPRETATION OF PHQ2 SCORE: 0

## 2022-06-07 ASSESSMENT — FIBROSIS 4 INDEX: FIB4 SCORE: 0.7

## 2022-06-07 NOTE — PROGRESS NOTES
Subjective:     Chief Complaint   Patient presents with   • Pancreatitis   • Back Pain   • Blood Sugar Problem     Concern has not increased sugar or carbs     Zachery Esquivel is a 56 y.o. male here today for evaluation and management of:    IFG (impaired fasting glucose)/ Obesity (BMI 30-39.9)/Low HDL (under 40)  Reports he has cut out all processed food about 6 weeks ago. Has lost 18 lbs since starting.   Exercising regularly-- classes at gym (low intensity), low weight/high reps    Chronic bilateral low back pain without sciatica  Reports last epidural about 1 year ago. Feeling good.   Wearing shoes around house (hard wood/tile floors)  Tends to overdo it when working in yard.      Hx of pancreatitis  Reports last year on vacation in [place] and had acute pancreatitis. Reports was drinking more than normal. Rarely drinks at home.  Stayed in room for 4 days-- rested and didn't eat. Flexeril for spasm.   Scared    HM  Colonoscopy in early 2000s due to   Fundal plication for GERD-- had an EGD/manometer and lower EGD-- normal.  No family hx of colorectal cancer.     Current medicines (including changes today)  Current Outpatient Medications   Medication Sig Dispense Refill   • tamsulosin (FLOMAX) 0.4 MG capsule Take 0.4 mg by mouth ONE-HALF HOUR AFTER BREAKFAST. (Patient not taking: Reported on 6/7/2022)       No current facility-administered medications for this visit.        Objective:     Vitals:    06/07/22 0759   BP: 110/66   BP Location: Right arm   Patient Position: Sitting   BP Cuff Size: Large adult   Pulse: 66   Temp: 36.2 °C (97.2 °F)   TempSrc: Temporal   SpO2: 96%   Weight: 104 kg (230 lb)   Height: 1.829 m (6')     Body mass index is 31.19 kg/m².     Physical Exam:  Constitutional: Alert, no distress, well-groomed.  Skin: Warm, dry, good turgor, no rashes in visible areas.  Eye: Equal, round and reactive, conjunctiva clear, lids normal.  Neck: Trachea midline, no masses, no  thyromegaly.  Cardiovascular: Regular rate and rhythm.  Chest: Effort normal. Clear to auscultation throughout. No adventitious sounds.   Abdomen: Soft, no gross masses.  MSK: Normal gait, moves all extremities.  Neuro: Grossly non-focal. No cranial nerve deficit. Strength and sensation intact.   Psych: Alert and oriented x3, normal affect and mood.    Assessment and Plan:   The following treatment plan was discussed:     1. IFG (impaired fasting glucose)  Chronic, stable. Continue lifestyle modifications. Monitor labs regularly.   - Comp Metabolic Panel; Future  - CBC WITH DIFFERENTIAL; Future  - HEMOGLOBIN A1C; Future    2. Obesity (BMI 30-39.9)  - Patient identified as having weight management issue.  Appropriate orders and counseling given.  - continue with dietary changes and increase exercise intensity. Goal is to lose 30 more lbs.    3. Low HDL (under 40)  Chronic, stable. Continue lifestyle modifications. Monitor labs regularly.   - Lipid Profile; Future  - TSH WITH REFLEX TO FT4; Future    4. Chronic bilateral low back pain without sciatica  Chronic, stable. Continue lifestyle modifications. Monitor symptoms regularly. If pain returns, will schedule with physiatry.      5. Hx of pancreatitis  Stable since recovery. Avoiding alcohol.     6. Screening for prostate cancer  discussed pros and cons with patient he would like labs ordered   - PROSTATE SPECIFIC AG SCREENING; Future    7. Screening for colorectal cancer  Educated on importance of colonoscopies for screening of colon cancer. Patient declines. Cologuard discussed and ordered.   - COLOGUARD (FIT DNA)    Health Maintenance: COVID vaccine x 2-- will send records via [employer] so we can update his immunization record ([facility])    Followup: No follow-ups on file.

## 2022-08-23 ENCOUNTER — OFFICE VISIT (OUTPATIENT)
Dept: PHYSICAL MEDICINE AND REHAB | Facility: MEDICAL CENTER | Age: 57
End: 2022-08-23
Payer: COMMERCIAL

## 2022-08-23 VITALS
DIASTOLIC BLOOD PRESSURE: 72 MMHG | HEIGHT: 72 IN | BODY MASS INDEX: 31.8 KG/M2 | OXYGEN SATURATION: 95 % | HEART RATE: 83 BPM | WEIGHT: 234.79 LBS | SYSTOLIC BLOOD PRESSURE: 118 MMHG | TEMPERATURE: 97.8 F

## 2022-08-23 DIAGNOSIS — Z98.890 HISTORY OF LUMBOSACRAL SPINE SURGERY: ICD-10-CM

## 2022-08-23 DIAGNOSIS — M77.12 LATERAL EPICONDYLITIS OF LEFT ELBOW: ICD-10-CM

## 2022-08-23 DIAGNOSIS — M48.061 SPINAL STENOSIS OF LUMBAR REGION, UNSPECIFIED WHETHER NEUROGENIC CLAUDICATION PRESENT: ICD-10-CM

## 2022-08-23 DIAGNOSIS — M54.16 LUMBAR RADICULOPATHY: ICD-10-CM

## 2022-08-23 PROCEDURE — 99214 OFFICE O/P EST MOD 30 MIN: CPT | Performed by: PHYSICAL MEDICINE & REHABILITATION

## 2022-08-23 RX ORDER — GABAPENTIN 300 MG/1
300-600 CAPSULE ORAL 3 TIMES DAILY PRN
Qty: 180 CAPSULE | Refills: 3 | Status: SHIPPED | OUTPATIENT
Start: 2022-08-23 | End: 2023-04-12

## 2022-08-23 RX ORDER — FLUTICASONE PROPIONATE 50 MCG
1 SPRAY, SUSPENSION (ML) NASAL DAILY
COMMUNITY

## 2022-08-23 ASSESSMENT — PATIENT HEALTH QUESTIONNAIRE - PHQ9: CLINICAL INTERPRETATION OF PHQ2 SCORE: 0

## 2022-08-23 ASSESSMENT — PAIN SCALES - GENERAL: PAINLEVEL: 8=MODERATE-SEVERE PAIN

## 2022-08-23 ASSESSMENT — FIBROSIS 4 INDEX: FIB4 SCORE: 0.89

## 2022-08-23 NOTE — PROGRESS NOTES
Follow up patient note  Interventional spine and sports physiatry, Physical medicine rehabilitation      Chief complaint:   Chief Complaint   Patient presents with    Follow-Up     Back pain         HISTORY    Please see new patient note by Dr Larson,  for more details.     HPI  Patient identification: Zachery Esquivel ,  1965,   With Diagnoses of Lumbar radiculopathy, Spinal stenosis of lumbar region, unspecified whether neurogenic claudication present, History of lumbosacral spine surgery L4-5 fusion done by Dr Barrie Espinoza, and Lateral epicondylitis of left elbow were pertinent to this visit.     Procedures:  10/12/2020 bilateral L3-4 transforaminal epidural steroid injection with 80% pain relief post procedure   2021 bilateral L3-4 transforaminal epidural steroid injection 90% improvement for 1 year    Medications tried oxycodone, Zanaflex, Flexeril, Medrol Dosepak, diclofenac, Robaxin     Had elevated LFT on tylenol in the past.      The patient has had a recurrence of his bilateral low back pain radiating towards the hips and the anterior thighs as well as the buttocks.  Previously this is well controlled after epidural steroid injections that he did very well in the past.  Now the pain has returned.  8 out of 10 in intensity, constant.  Causing functional deficits.    He is also having a separate issue with intermittent left lateral elbow pain worse with wrist extension 5 out of 10 intensity.  Worse with swinging a hammer and doing tasks and lifting.    He has a provider driven home exercise program and physical therapy driven home exercise program which she has done including the past year        Medications tried include NSAIDs, gabapentin as a muscle relaxer and neuropathic pain medication, acetaminophen     ROS Red Flags :   Fever, Chills, Sweats: Denies  Involuntary Weight Loss: Denies  Bowel/Bladder Incontinence: Denies  Saddle Anesthesia: Denies        PMHx:   Past Medical History:    Diagnosis Date    Low back pain     S/P laparoscopic fundoplication        PSHx:   Past Surgical History:   Procedure Laterality Date    NC NJX AA&/STRD TFRML EPI LUMBAR/SACRAL 1 LEVEL Bilateral 6/21/2021    Procedure: BILATERAL L3-4 transforaminal epidural steroid injection with fluoroscopic guidance;  Surgeon: Jack Larson M.D.;  [facility];  Service: Pain Management    NC NJX AA&/STRD TFRML EPI LUMBAR/SACRAL 1 LEVEL Bilateral 10/12/2020    Procedure: INJECTION, SPINE, LUMBOSACRAL, EPIDURAL, 1 LEVEL, TRANSFORAMINAL APPROACH;  Surgeon: Jack Larson M.D.;  Location[facility];  Service: Pain Management    ABDOMINAL EXPLORATION      APPENDECTOMY      CHOLECYSTECTOMY      LUMBAR LAMINECTOMY DISKECTOMY      l4-5 with fusion    NISSEN FUNDOPLICATION LAPAROSCOPIC         Family history   Family History   Problem Relation Age of Onset    Diabetes Father     Breast Cancer Sister 35    Diabetes Paternal Grandmother     Heart Disease Neg Hx     Stroke Neg Hx     Hypertension Neg Hx     Hyperlipidemia Neg Hx     Colorectal Cancer Neg Hx          Medications:   Outpatient Medications Marked as Taking for the 8/23/22 encounter (Office Visit) with Jack Larson M.D.   Medication Sig Dispense Refill    fluticasone (FLONASE) 50 MCG/ACT nasal spray Administer 1 Spray into affected nostril(S) every day.      diclofenac sodium (VOLTAREN) 1 % Gel Apply 3 g topically 4 times a day as needed (pain). 100 g 3    gabapentin (NEURONTIN) 300 MG Cap Take 1-2 Capsules by mouth 3 times a day as needed (pain). 180 Capsule 3        Current Outpatient Medications on File Prior to Visit   Medication Sig Dispense Refill    fluticasone (FLONASE) 50 MCG/ACT nasal spray Administer 1 Spray into affected nostril(S) every day.      tamsulosin (FLOMAX) 0.4 MG capsule Take 0.4 mg by mouth ONE-HALF HOUR AFTER BREAKFAST. (Patient not taking: Reported on 6/7/2022)       No current  facility-administered medications on file prior to visit.         Allergies:   Allergies   Allergen Reactions    Penicillins Rash     .    Tylenol      Can't take due to elevated liver enzymes    Bactrim [Sulfamethoxazole W-Trimethoprim]      Itching rash palms and soles       Social Hx:   Social History     Socioeconomic History    Marital status:      Spouse name: Not on file    Number of children: Not on file    Years of education: Not on file    Highest education level: Not on file   Occupational History    Not on file   Tobacco Use    Smoking status: Never    Smokeless tobacco: Never   Vaping Use    Vaping Use: Never used   Substance and Sexual Activity    Alcohol use: Yes     Alcohol/week: 1.0 oz     Types: 2 Glasses of wine per week     Comment: Occasionally    Drug use: No    Sexual activity: Yes     Partners: Female   Other Topics Concern     Service No    Blood Transfusions Yes    Caffeine Concern No    Occupational Exposure No    Hobby Hazards No    Sleep Concern Yes    Stress Concern Yes    Weight Concern No    Special Diet No    Back Care Yes     Comment: back brace    Exercise Yes    Bike Helmet No    Seat Belt Yes    Self-Exams No   Social History Narrative    Not on file     Social Determinants of Health     Financial Resource Strain: Not on file   Food Insecurity: Not on file   Transportation Needs: Not on file   Physical Activity: Not on file   Stress: Not on file   Social Connections: Not on file   Intimate Partner Violence: Not on file   Housing Stability: Not on file         EXAMINATION     Physical Exam:   Vitals: /72 (BP Location: Right arm, Patient Position: Sitting, BP Cuff Size: Adult)   Pulse 83   Temp 36.6 °C (97.8 °F) (Temporal)   Ht 1.829 m (6')   Wt 107 kg (234 lb 12.6 oz)   SpO2 95%     Constitutional:   Body Habitus: Body mass index is 31.84 kg/m².  Cooperation: Fully cooperates with exam  Appearance: Well-groomed no disheveled    Respiratory-  breathing  comfortable on room air, no audible wheezing  Cardiovascular- capillary refills less than 2 seconds. No lower extremity edema is noted.   Psychiatric- alert and oriented ×3. Normal affect.    MSK and Neuro: -      Thoracic/Lumbar Spine/Sacral Spine/Hips   There are no signs of infection around the injection sites.   decreased active range of motion with flexion, lateral flexion, and rotation bilaterally.   There is decreased active range of motion with lumbar extension.    There is  pain with lumbar extension.   There is pain with facet loading maneuver (extension rotation) with axial low back pain on the BILATERAL side(s)    Palpation:   No tenderness to palpation in midline at T1-T12 levels. No tenderness to palpation in the left and right of the midline T1-L5, NEGATIVE for tenderness to palpation to the para-midline region in the lower lumbar levels.  palpation over SI joint: negative bilaterally    palpation in hip or over the gluteus medius tendon insertion: negative bilaterally      Lumbar spine Special tests  Neuro tension  Straight leg test positive bilaterally    Slump test positive bilaterally    Femoral stretch test positive bilaterally for radiculopathy pain    Key points for the international standards for neurological classification of spinal cord injury (ISNCSCI) to light touch.     Dermatome R L                                      L2 2 2   L3 2 2   L4 2 2   L5 2 2   S1 2 2   S2 2 2         Motor Exam Lower Extremities    ? Myotome R L   Hip flexion L2 5 5   Knee extension L3 5 5   Ankle dorsiflexion L4 5 5   Toe extension L5 5 5   Ankle plantarflexion S1 5 5               MEDICAL DECISION MAKING    DATA    Labs:   Lab Results   Component Value Date/Time    SODIUM 143 06/07/2022 08:39 AM    POTASSIUM 4.4 06/07/2022 08:39 AM    CHLORIDE 108 06/07/2022 08:39 AM    CO2 25 06/07/2022 08:39 AM    GLUCOSE 116 (H) 06/07/2022 08:39 AM    BUN 19 06/07/2022 08:39 AM    CREATININE 1.09 06/07/2022 08:39 AM         No results found for: PROTHROMBTM, INR     Lab Results   Component Value Date/Time    WBC 6.5 06/07/2022 08:39 AM    RBC 5.55 06/07/2022 08:39 AM    HEMOGLOBIN 16.9 06/07/2022 08:39 AM    HEMATOCRIT 51.1 06/07/2022 08:39 AM    MCV 92.1 06/07/2022 08:39 AM    MCH 30.5 06/07/2022 08:39 AM    MCHC 33.1 (L) 06/07/2022 08:39 AM    MPV 10.5 06/07/2022 08:39 AM    NEUTSPOLYS 60.50 06/07/2022 08:39 AM    LYMPHOCYTES 22.90 06/07/2022 08:39 AM    MONOCYTES 10.50 06/07/2022 08:39 AM    EOSINOPHILS 4.90 06/07/2022 08:39 AM    BASOPHILS 0.90 06/07/2022 08:39 AM        Lab Results   Component Value Date/Time    HBA1C 5.6 06/07/2022 08:39 AM          Imaging:   I personally reviewed following images    MRI lumbar spine 9/27/2020  At L5-S1 there is a disc protrusion.  This is causing mild to moderate central canal stenosis and moderate bilateral stenosis right worse than left.  History of L4-5 posterior fusion.  At L3-4 there is moderate to severe central canal stenosis and bilateral neuroforaminal stenosis.  Facet arthropathy at L3-4 and L5-S1.  There are no acute fractures.    I reviewed the fluoroscopic images from 10/12/2020 showing successful bilateral L3-4 transforaminal epidural steroid injection.  I reviewed these with the patient on 11/5/2020.    I reviewed the following radiology reports                                        Results for orders placed during the hospital encounter of 09/27/20   MR-LUMBAR SPINE-W/O    Impression 1.  L3-4 mild-moderate spinal stenosis    2.  L5-S1 left paracentral disc extrusion moderately narrowing the left lateral recess and foramen    3.  L4-5 postoperative changes appear within normal limits                Results for orders placed during the hospital encounter of 09/27/20   MR-LUMBAR SPINE-W/O    Impression 1.  L3-4 mild-moderate spinal stenosis    2.  L5-S1 left paracentral disc extrusion moderately narrowing the left lateral recess and foramen    3.  L4-5 postoperative changes  appear within normal limits                                                                                                                                                                                                        Results for orders placed during the hospital encounter of 10/15/20   DX-LUMBAR SPINE-4+ VIEWS    Impression 1.  No compression deformity or acute fracture is identified.    2.  Degenerative disc disease and facet arthropathy is most prominent in the lower lumbar spine. Posterior fusion is noted at L4-L5.    3.  No focal instability is noted on flexion extension views.                                         DIAGNOSIS   Visit Diagnoses     ICD-10-CM   1. Lumbar radiculopathy  M54.16   2. Spinal stenosis of lumbar region, unspecified whether neurogenic claudication present  M48.061   3. History of lumbosacral spine surgery L4-5 fusion done by Dr Barrie Espinoza  Z98.890   4. Lateral epicondylitis of left elbow  M77.12           ASSESSMENT and PLAN:     Zachery Esquivel  1965 male      Zachery was seen today for follow-up.    Diagnoses and all orders for this visit:    Lumbar radiculopathy  Comments:  Not controlled.  I have placed an order for bilateral L3-4 transforaminal epidural steroid injection  Orders:  -     gabapentin (NEURONTIN) 300 MG Cap; Take 1-2 Capsules by mouth 3 times a day as needed (pain).  -     Referral to Physical Medicine Rehab  -     Referral to Physical Therapy    Spinal stenosis of lumbar region, unspecified whether neurogenic claudication present  Comments:  Not controlled, proceed with epidural steroid injection  Orders:  -     gabapentin (NEURONTIN) 300 MG Cap; Take 1-2 Capsules by mouth 3 times a day as needed (pain).  -     Referral to Physical Therapy    History of lumbosacral spine surgery L4-5 fusion done by Dr Barrie Espinoza  -     gabapentin (NEURONTIN) 300 MG Cap; Take 1-2 Capsules by mouth 3 times a day as needed (pain).  -     Referral to Physical  Therapy    Lateral epicondylitis of left elbow  Comments:  I prescribed a home exercise program as well as physical therapy  Orders:  -     diclofenac sodium (VOLTAREN) 1 % Gel; Apply 3 g topically 4 times a day as needed (pain).  -     Referral to Physical Therapy             Follow up: After the above procedure    Thank you for allowing me to participate in the care of this patient. If you have any questions please not hesitate to contact me.             Please note that this dictation was created using voice recognition software. I have made every reasonable attempt to correct obvious errors but there may be errors of grammar and content that I may have overlooked prior to finalization of this note.      Jack Larson MD  Interventional Spine and Sports Physiatry  Physical Medicine and Rehabilitation  [facility]

## 2022-08-23 NOTE — PATIENT INSTRUCTIONS
Your procedure will be at the [facility] special procedure suite.    [facility] [place] [address]       PRE-PROCEDURE INSTRUCTIONS  You may take your regular medications except:   No Anti-inflammatories 5 days prior to your procedure. Anti-inflammatories include medicines such as  ibuprofen (Motrin, Advil), Excedrin, Naproxen (Aleve, Anaprox, Naprelan, Naprosyn), Celecoxib (Celebrex), Diclofenac (Voltaren-XR tab), and Meloxicam (Mobic).   You can take the remainder of your pain medications as prescribed.   If you are having a diagnostic procedure such as a medial branch block, do not use her pain meds on the day of the procedure  No Glucophage or Metformin 24 hours before your procedure. You may resume next day after your procedure.  Call the physiatry office if you are taking or prescribed anti-biotics within five days of procedure.  Please ask provider if you are taking any new diabetes medication.  CONTINUE TAKING BLOOD PRESSURE MEDICATIONS AS PRESCRIBED.  Pain medications will not be prescribed on the procedure day. Procedural pain medication may be used by your provider   Call your doctor's office performing the procedure if you have a fever, chills, rash or new illness prior to your procedure    Anticoagulation/antiplatelet medications  No Blood thinning medications such as Coumadin, Xarelto, aspirin or Plavix 5 days prior to procedure unless your doctor said to continue these medications. Call your doctor if a new medication is prescribed in this class.     Restrictions for eating before procedure:   If you are getting procedural sedation, then do not eat to for 8 hours prior to procedure appointment time. Do not drink fluids for four hours prior to your procedure time.   If you are not having procedural sedation, then Skip the meal prior to your procedure. If you have a morning procedure then skip breakfast. If you have an afternoon procedure then skip lunch.   You may drink clear liquids  up to 2 hours prior to your procedure  You must have a  the day of procedure to accompany you home.      POST PROCEDURE INSTRUCTIONS   No heavy lifting, strenuous bending or strenuous exercise for 3 days after your procedure.  No hot tubs, baths, swimming for 3 days after your procedure  You can remove the bandage the day after the procedure.  IF YOU RECEIVED A STEROID INJECTION. PLEASE NOTE THAT THERE MAY BE A DELAY FOR THE INJECTION TO START WORKING, THE DELAY MAY BE UP TO TWO WEEKS. IF YOU HAVE DIABETES, PLEASE NOTE THAT YOUR SUGAR LEVELS MAY BE ELEVATED FOR 1-2 DAYS AFTER A STEROID INJECTION.  THE STEROID MAY CAUSE TEMPORARY SYMPTOMS WHICH USUALLY RESOLVE ON THEIR OWN WITHIN 1 TO 2 DAYS INCLUDING FACIAL FLUSHING OR A FEELING OF WARMTH ON THE FACE, TEMPORARY INCREASES IN BLOOD SUGAR, INSOMNIA, INCREASED HUNGER  IF YOU EXPERIENCE PROLONGED WEAKNESS LONGER THAN ONE DAY, BOWEL OR BLADDER INCONTINENCE THEN PLEASE CALL THE PHYSIATRY OFFICE.  Your leg may feel heavy, weak and numb for up to 1-2 days. Be very careful walking.    You may resume normal activities 3 days after procedure.

## 2023-04-09 SDOH — ECONOMIC STABILITY: HOUSING INSECURITY
IN THE LAST 12 MONTHS, WAS THERE A TIME WHEN YOU DID NOT HAVE A STEADY PLACE TO SLEEP OR SLEPT IN A SHELTER (INCLUDING NOW)?: NO

## 2023-04-09 SDOH — HEALTH STABILITY: PHYSICAL HEALTH: ON AVERAGE, HOW MANY DAYS PER WEEK DO YOU ENGAGE IN MODERATE TO STRENUOUS EXERCISE (LIKE A BRISK WALK)?: 2 DAYS

## 2023-04-09 SDOH — ECONOMIC STABILITY: FOOD INSECURITY: WITHIN THE PAST 12 MONTHS, THE FOOD YOU BOUGHT JUST DIDN'T LAST AND YOU DIDN'T HAVE MONEY TO GET MORE.: NEVER TRUE

## 2023-04-09 SDOH — ECONOMIC STABILITY: HOUSING INSECURITY: IN THE LAST 12 MONTHS, HOW MANY PLACES HAVE YOU LIVED?: 1

## 2023-04-09 SDOH — ECONOMIC STABILITY: FOOD INSECURITY: WITHIN THE PAST 12 MONTHS, YOU WORRIED THAT YOUR FOOD WOULD RUN OUT BEFORE YOU GOT MONEY TO BUY MORE.: NEVER TRUE

## 2023-04-09 SDOH — ECONOMIC STABILITY: TRANSPORTATION INSECURITY
IN THE PAST 12 MONTHS, HAS LACK OF TRANSPORTATION KEPT YOU FROM MEETINGS, WORK, OR FROM GETTING THINGS NEEDED FOR DAILY LIVING?: NO

## 2023-04-09 SDOH — ECONOMIC STABILITY: TRANSPORTATION INSECURITY
IN THE PAST 12 MONTHS, HAS THE LACK OF TRANSPORTATION KEPT YOU FROM MEDICAL APPOINTMENTS OR FROM GETTING MEDICATIONS?: NO

## 2023-04-09 SDOH — ECONOMIC STABILITY: TRANSPORTATION INSECURITY
IN THE PAST 12 MONTHS, HAS LACK OF RELIABLE TRANSPORTATION KEPT YOU FROM MEDICAL APPOINTMENTS, MEETINGS, WORK OR FROM GETTING THINGS NEEDED FOR DAILY LIVING?: NO

## 2023-04-09 SDOH — ECONOMIC STABILITY: INCOME INSECURITY: IN THE LAST 12 MONTHS, WAS THERE A TIME WHEN YOU WERE NOT ABLE TO PAY THE MORTGAGE OR RENT ON TIME?: NO

## 2023-04-09 SDOH — HEALTH STABILITY: MENTAL HEALTH
STRESS IS WHEN SOMEONE FEELS TENSE, NERVOUS, ANXIOUS, OR CAN'T SLEEP AT NIGHT BECAUSE THEIR MIND IS TROUBLED. HOW STRESSED ARE YOU?: ONLY A LITTLE

## 2023-04-09 SDOH — HEALTH STABILITY: PHYSICAL HEALTH: ON AVERAGE, HOW MANY MINUTES DO YOU ENGAGE IN EXERCISE AT THIS LEVEL?: 30 MIN

## 2023-04-09 SDOH — ECONOMIC STABILITY: INCOME INSECURITY: HOW HARD IS IT FOR YOU TO PAY FOR THE VERY BASICS LIKE FOOD, HOUSING, MEDICAL CARE, AND HEATING?: NOT HARD AT ALL

## 2023-04-09 ASSESSMENT — SOCIAL DETERMINANTS OF HEALTH (SDOH)
HOW OFTEN DO YOU ATTEND CHURCH OR RELIGIOUS SERVICES?: 1 TO 4 TIMES PER YEAR
HOW OFTEN DO YOU GET TOGETHER WITH FRIENDS OR RELATIVES?: TWICE A WEEK
HOW OFTEN DO YOU HAVE A DRINK CONTAINING ALCOHOL: 2-3 TIMES A WEEK
HOW OFTEN DO YOU GET TOGETHER WITH FRIENDS OR RELATIVES?: TWICE A WEEK
DO YOU BELONG TO ANY CLUBS OR ORGANIZATIONS SUCH AS CHURCH GROUPS UNIONS, FRATERNAL OR ATHLETIC GROUPS, OR SCHOOL GROUPS?: YES
HOW HARD IS IT FOR YOU TO PAY FOR THE VERY BASICS LIKE FOOD, HOUSING, MEDICAL CARE, AND HEATING?: NOT HARD AT ALL
HOW OFTEN DO YOU ATTENT MEETINGS OF THE CLUB OR ORGANIZATION YOU BELONG TO?: MORE THAN 4 TIMES PER YEAR
IN A TYPICAL WEEK, HOW MANY TIMES DO YOU TALK ON THE PHONE WITH FAMILY, FRIENDS, OR NEIGHBORS?: ONCE A WEEK
IN A TYPICAL WEEK, HOW MANY TIMES DO YOU TALK ON THE PHONE WITH FAMILY, FRIENDS, OR NEIGHBORS?: ONCE A WEEK
HOW OFTEN DO YOU ATTEND CHURCH OR RELIGIOUS SERVICES?: 1 TO 4 TIMES PER YEAR
DO YOU BELONG TO ANY CLUBS OR ORGANIZATIONS SUCH AS CHURCH GROUPS UNIONS, FRATERNAL OR ATHLETIC GROUPS, OR SCHOOL GROUPS?: YES
HOW MANY DRINKS CONTAINING ALCOHOL DO YOU HAVE ON A TYPICAL DAY WHEN YOU ARE DRINKING: 1 OR 2
HOW OFTEN DO YOU HAVE SIX OR MORE DRINKS ON ONE OCCASION: NEVER
WITHIN THE PAST 12 MONTHS, YOU WORRIED THAT YOUR FOOD WOULD RUN OUT BEFORE YOU GOT THE MONEY TO BUY MORE: NEVER TRUE
HOW OFTEN DO YOU ATTENT MEETINGS OF THE CLUB OR ORGANIZATION YOU BELONG TO?: MORE THAN 4 TIMES PER YEAR

## 2023-04-09 ASSESSMENT — LIFESTYLE VARIABLES
HOW OFTEN DO YOU HAVE SIX OR MORE DRINKS ON ONE OCCASION: NEVER
SKIP TO QUESTIONS 9-10: 1
HOW MANY STANDARD DRINKS CONTAINING ALCOHOL DO YOU HAVE ON A TYPICAL DAY: 1 OR 2
HOW OFTEN DO YOU HAVE A DRINK CONTAINING ALCOHOL: 2-3 TIMES A WEEK
AUDIT-C TOTAL SCORE: 3

## 2023-04-12 ENCOUNTER — OFFICE VISIT (OUTPATIENT)
Dept: MEDICAL GROUP | Facility: MEDICAL CENTER | Age: 58
End: 2023-04-12
Payer: COMMERCIAL

## 2023-04-12 VITALS
DIASTOLIC BLOOD PRESSURE: 66 MMHG | HEIGHT: 72 IN | OXYGEN SATURATION: 92 % | HEART RATE: 76 BPM | BODY MASS INDEX: 31.23 KG/M2 | SYSTOLIC BLOOD PRESSURE: 118 MMHG | WEIGHT: 230.6 LBS | TEMPERATURE: 97.5 F

## 2023-04-12 DIAGNOSIS — J30.2 SEASONAL ALLERGIES: ICD-10-CM

## 2023-04-12 DIAGNOSIS — M25.519 TRIGGER POINT OF SHOULDER REGION, UNSPECIFIED LATERALITY: ICD-10-CM

## 2023-04-12 DIAGNOSIS — G89.29 CHRONIC BILATERAL LOW BACK PAIN WITHOUT SCIATICA: ICD-10-CM

## 2023-04-12 DIAGNOSIS — Z82.49 FAMILY HISTORY OF MI (MYOCARDIAL INFARCTION): ICD-10-CM

## 2023-04-12 DIAGNOSIS — M54.50 CHRONIC BILATERAL LOW BACK PAIN WITHOUT SCIATICA: ICD-10-CM

## 2023-04-12 DIAGNOSIS — R10.9 ABDOMINAL CRAMPING: ICD-10-CM

## 2023-04-12 DIAGNOSIS — Z00.00 PE (PHYSICAL EXAM), ANNUAL: ICD-10-CM

## 2023-04-12 PROBLEM — M51.26 DISPLACEMENT OF LUMBAR INTERVERTEBRAL DISC WITHOUT MYELOPATHY: Status: ACTIVE | Noted: 2023-04-12

## 2023-04-12 PROCEDURE — 99214 OFFICE O/P EST MOD 30 MIN: CPT | Performed by: NURSE PRACTITIONER

## 2023-04-12 RX ORDER — OSELTAMIVIR PHOSPHATE 75 MG/1
CAPSULE ORAL
COMMUNITY
Start: 2023-03-22 | End: 2023-04-12

## 2023-04-12 ASSESSMENT — PATIENT HEALTH QUESTIONNAIRE - PHQ9: CLINICAL INTERPRETATION OF PHQ2 SCORE: 0

## 2023-04-12 ASSESSMENT — FIBROSIS 4 INDEX: FIB4 SCORE: 0.91

## 2023-04-12 NOTE — ASSESSMENT & PLAN NOTE
New to me.  Occasional abdominal cramping at the RUQ.  2 years ago while on vacation in [place] was diagnosed with pancreatitis.  He declined hospital stay and was able to resolve the symptoms with oral fluid intake and decrease trigger foods such as alcohol and greasy foods.  History of cholecystectomy and generally has poor tolerance for greasy foods and alcohol.  The symptoms are well controlled with diet and by avoiding trigger foods.  No complaints today.

## 2023-04-12 NOTE — PROGRESS NOTES
Chief Complaint   Patient presents with    Abdominal Pain     Complications of pancreatitis 2 years ago     Referral Needed     Massage therapy ([facility])       HISTORY OF PRESENT ILLNESS: Patient is a 57 y.o. male, established patient who presents today to discuss medical problems as listed below:    Health Maintenance:  COMPLETED       Allergies: Penicillins, Tylenol, and Bactrim [sulfamethoxazole w-trimethoprim]    Current Outpatient Medications   Medication Sig Dispense Refill    fluticasone (FLONASE) 50 MCG/ACT nasal spray Administer 1 Spray into affected nostril(S) every day.       No current facility-administered medications for this visit.       Allergies, past medical history, past surgical history, family history, social history reviewed and updated.    Review of Systems:     - Constitutional: Negative for fever, chills, unexpected weight change, and fatigue/generalized weakness.     - HEENT: Negative for headaches, vision changes, hearing changes, ear pain, ear discharge, rhinorrhea, sinus congestion, sore throat, and neck pain.      - Respiratory: Negative for cough, sputum production, chest congestion, dyspnea, wheezing, and crackles.      - Cardiovascular: Negative for chest pain, palpitations, orthopnea, and bilateral lower extremity edema.     - Gastrointestinal: Negative for heartburn, nausea, vomiting, abdominal pain, hematochezia, melena, diarrhea, constipation, and greasy/foul-smelling stools.     - Genitourinary: Negative for dysuria, polyuria, hematuria, pyuria, urinary urgency, and urinary incontinence.    - Musculoskeletal: Negative for myalgias, back pain, and joint pain.     - Skin: Negative for rash, itching, cyanotic skin color change.     - Neurological: Negative for dizziness, tingling, tremors, focal sensory deficit, focal weakness and headaches.     - Endo/Heme/Allergies: Does not bruise/bleed easily.     - Psychiatric/Behavioral: Negative for depression,  suicidal/homicidal ideation and memory loss.      All other systems reviewed and are negative    Exam:    /66 (BP Location: Left arm, Patient Position: Sitting, BP Cuff Size: Adult)   Pulse 76   Temp 36.4 °C (97.5 °F) (Temporal)   Ht 1.829 m (6')   Wt 105 kg (230 lb 9.6 oz)   SpO2 92%   BMI 31.28 kg/m²  Body mass index is 31.28 kg/m².    Physical Exam:  Constitutional: Well-developed and well-nourished. Not diaphoretic. No distress.   Skin: Skin is warm and dry. No rash noted.  Head: Atraumatic without lesions.  Eyes: Conjunctivae and extraocular motions are normal. Pupils are equal, round, and reactive to light. No scleral icterus.   Ears:  External ears unremarkable. Tympanic membranes clear and intact.  Nose: Nares patent. Septum midline. Turbinates without erythema nor edema. No discharge.   Mouth/Throat: Dentition is normal. Tongue normal. Oropharynx is clear and moist. Posterior pharynx without erythema or exudates.  Neck: Supple, trachea midline. Normal range of motion. No thyromegaly present. No lymphadenopathy--cervical or supraclavicular.  Cardiovascular: Regular rate and rhythm, S1 and S2 without murmur, rubs, or gallops.    Chest: Effort normal. Clear to auscultation throughout. No adventitious sounds. No CVA tenderness.  Abdomen: Soft, non tender, and without distention. Active bowel sounds in all four quadrants. No rebound, guarding, masses or HSM.  : Negative for dysuria, polyuria, hematuria, pyuria, urinary urgency, and urinary incontinence.  Extremities: No cyanosis, clubbing, erythema, nor edema. Distal pulses intact and symmetric.   Musculoskeletal: All major joints AROM full in all directions without pain.  Neurological: Alert and oriented x 3. DTRs 2+/3 and symmetric. No cranial nerve deficit. 5/5 myotomes. Sensation intact. Negative Rhomberg.  Psychiatric:  Behavior, mood, and affect are appropriate.  MA/nursing note and vitals reviewed.    LABS: 6/2022  results reviewed and  discussed with the patient, questions answered.    Assessment/Plan:  Seasonal allergies  New to me.  This is a chronic problem for patient.  Seasonal allergies for which he takes over-the-counter Flonase as well as over-the-counter allergy medicine. No s/s today.    Chronic bilateral low back pain without sciatica  New to me. Back surgery 2013 L4-5. Followed by physiatry Dr. Larson, getting epidurals.     Trigger point of shoulder region  New to me. Has done trigger points with his massage therapist Marquita Will LMT. This therapy helps. No need for additional referrals today.   Has gone to [facility].  Needs a letter of necessity to continue trigger points Tx.    Abdominal cramping  New to me.  Occasional abdominal cramping at the RUQ.  2 years ago while on vacation in [place] was diagnosed with pancreatitis.  He declined hospital stay and was able to resolve the symptoms with oral fluid intake and decrease trigger foods such as alcohol and greasy foods.  History of cholecystectomy and generally has poor tolerance for greasy foods and alcohol.  The symptoms are well controlled with diet and by avoiding trigger foods.  No complaints today.    1. Seasonal allergies  Stable with OTC management    2. Chronic bilateral low back pain without sciatica  Stable. Followed by physiatry and trigger point treatment with PT    3. PE (physical exam), annual  - CBC WITHOUT DIFFERENTIAL; Future  - Comp Metabolic Panel; Future  - FREE THYROXINE; Future  - Lipid Profile; Future  - VITAMIN D,25 HYDROXY (DEFICIENCY); Future  - HEMOGLOBIN A1C; Future  - TSH WITH REFLEX TO FT4; Future  - PROSTATE SPECIFIC AG SCREENING; Future    4. Trigger point of shoulder region, unspecified laterality  Stable, improved with massages    5. Family history of MI (myocardial infarction)  - CT-HEART W/O CONT EVAL CALCIUM; Future    6. Abdominal cramping  Stable with diet and lifestyle modifications      Discussed with patient possible alternative  diagnoses, patient is to take all medications as prescribed.      If symptoms persist FU w/PCP, if symptoms worsen go to emergency room.      If experiencing any side effects from prescribed medications report to the office immediately or go to emergency room.     Reviewed indication, dosage, usage and potential adverse effects of prescribed medications.      Reviewed risks and benefits of treatment plan. Patient verbalizes understanding of all instruction and verbally agrees to plan.     Discussed plan with the patient, and patient agrees to the above.      I personally reviewed prior external notes and test results pertinent to today's visit.      No follow-ups on file. 2 mo f/u

## 2023-04-12 NOTE — ASSESSMENT & PLAN NOTE
New to me. Has done trigger points with his massage therapist Marquita Will LMT. This therapy helps. No need for additional referrals today.   Has gone to [facility].  Needs a letter of necessity to continue trigger points Tx.

## 2023-04-12 NOTE — ASSESSMENT & PLAN NOTE
New to me.  This is a chronic problem for patient.  Seasonal allergies for which he takes over-the-counter Flonase as well as over-the-counter allergy medicine. No s/s today.

## 2023-04-12 NOTE — LETTER
April 12, 2023       Patient: Zachery Esquivel   YOB: 1965   Date of Visit: 4/12/2023         To Whom It May Concern:    Zachery Esquivel is an established patient in clinic.   This is a letter of necessity requesting to continue his treatments for trigger points in his upper back, shoulders and bilateral upper extremities.      If you have any questions or concerns, please don't hesitate to call [phone]          Sincerely,          SARA Lala.  Electronically Signed

## 2023-05-12 ENCOUNTER — HOSPITAL ENCOUNTER (OUTPATIENT)
Dept: RADIOLOGY | Facility: MEDICAL CENTER | Age: 58
End: 2023-05-12
Attending: NURSE PRACTITIONER
Payer: COMMERCIAL

## 2023-05-12 DIAGNOSIS — Z82.49 FAMILY HISTORY OF MI (MYOCARDIAL INFARCTION): ICD-10-CM

## 2023-05-12 PROCEDURE — 4410556 CT-CARDIAC SCORING (SELF PAY ONLY)

## 2023-06-12 ENCOUNTER — HOSPITAL ENCOUNTER (OUTPATIENT)
Dept: LAB | Facility: MEDICAL CENTER | Age: 58
End: 2023-06-12
Attending: NURSE PRACTITIONER
Payer: COMMERCIAL

## 2023-06-12 DIAGNOSIS — Z00.00 PE (PHYSICAL EXAM), ANNUAL: ICD-10-CM

## 2023-06-12 LAB
25(OH)D3 SERPL-MCNC: 21 NG/ML (ref 30–100)
ALBUMIN SERPL BCP-MCNC: 4.5 G/DL (ref 3.2–4.9)
ALBUMIN/GLOB SERPL: 1.9 G/DL
ALP SERPL-CCNC: 52 U/L (ref 30–99)
ALT SERPL-CCNC: 20 U/L (ref 2–50)
ANION GAP SERPL CALC-SCNC: 13 MMOL/L (ref 7–16)
AST SERPL-CCNC: 10 U/L (ref 12–45)
BILIRUB SERPL-MCNC: 1 MG/DL (ref 0.1–1.5)
BUN SERPL-MCNC: 16 MG/DL (ref 8–22)
CALCIUM ALBUM COR SERPL-MCNC: 8.8 MG/DL (ref 8.5–10.5)
CALCIUM SERPL-MCNC: 9.2 MG/DL (ref 8.5–10.5)
CHLORIDE SERPL-SCNC: 105 MMOL/L (ref 96–112)
CHOLEST SERPL-MCNC: 165 MG/DL (ref 100–199)
CO2 SERPL-SCNC: 22 MMOL/L (ref 20–33)
CREAT SERPL-MCNC: 0.94 MG/DL (ref 0.5–1.4)
ERYTHROCYTE [DISTWIDTH] IN BLOOD BY AUTOMATED COUNT: 42.1 FL (ref 35.9–50)
EST. AVERAGE GLUCOSE BLD GHB EST-MCNC: 120 MG/DL
FASTING STATUS PATIENT QL REPORTED: NORMAL
GFR SERPLBLD CREATININE-BSD FMLA CKD-EPI: 94 ML/MIN/1.73 M 2
GLOBULIN SER CALC-MCNC: 2.4 G/DL (ref 1.9–3.5)
GLUCOSE SERPL-MCNC: 114 MG/DL (ref 65–99)
HBA1C MFR BLD: 5.8 % (ref 4–5.6)
HCT VFR BLD AUTO: 49.5 % (ref 42–52)
HDLC SERPL-MCNC: 44 MG/DL
HGB BLD-MCNC: 16.6 G/DL (ref 14–18)
LDLC SERPL CALC-MCNC: 100 MG/DL
MCH RBC QN AUTO: 30.1 PG (ref 27–33)
MCHC RBC AUTO-ENTMCNC: 33.5 G/DL (ref 32.3–36.5)
MCV RBC AUTO: 89.7 FL (ref 81.4–97.8)
PLATELET # BLD AUTO: 208 K/UL (ref 164–446)
PMV BLD AUTO: 10.6 FL (ref 9–12.9)
POTASSIUM SERPL-SCNC: 4.1 MMOL/L (ref 3.6–5.5)
PROT SERPL-MCNC: 6.9 G/DL (ref 6–8.2)
PSA SERPL-MCNC: 0.92 NG/ML (ref 0–4)
RBC # BLD AUTO: 5.52 M/UL (ref 4.7–6.1)
SODIUM SERPL-SCNC: 140 MMOL/L (ref 135–145)
T4 FREE SERPL-MCNC: 1.37 NG/DL (ref 0.93–1.7)
TRIGL SERPL-MCNC: 105 MG/DL (ref 0–149)
TSH SERPL DL<=0.005 MIU/L-ACNC: 3.39 UIU/ML (ref 0.38–5.33)
WBC # BLD AUTO: 5.9 K/UL (ref 4.8–10.8)

## 2023-06-12 PROCEDURE — 84439 ASSAY OF FREE THYROXINE: CPT

## 2023-06-12 PROCEDURE — 85027 COMPLETE CBC AUTOMATED: CPT

## 2023-06-12 PROCEDURE — 84443 ASSAY THYROID STIM HORMONE: CPT

## 2023-06-12 PROCEDURE — 80061 LIPID PANEL: CPT

## 2023-06-12 PROCEDURE — 83036 HEMOGLOBIN GLYCOSYLATED A1C: CPT

## 2023-06-12 PROCEDURE — 36415 COLL VENOUS BLD VENIPUNCTURE: CPT

## 2023-06-12 PROCEDURE — 84153 ASSAY OF PSA TOTAL: CPT

## 2023-06-12 PROCEDURE — 80053 COMPREHEN METABOLIC PANEL: CPT

## 2023-06-12 PROCEDURE — 82306 VITAMIN D 25 HYDROXY: CPT

## 2023-06-13 ENCOUNTER — OFFICE VISIT (OUTPATIENT)
Dept: MEDICAL GROUP | Facility: MEDICAL CENTER | Age: 58
End: 2023-06-13
Payer: COMMERCIAL

## 2023-06-13 VITALS
OXYGEN SATURATION: 96 % | BODY MASS INDEX: 31.05 KG/M2 | WEIGHT: 229.28 LBS | TEMPERATURE: 97.6 F | HEIGHT: 72 IN | HEART RATE: 78 BPM

## 2023-06-13 DIAGNOSIS — E78.6 LOW HDL (UNDER 40): ICD-10-CM

## 2023-06-13 DIAGNOSIS — E88.819 INSULIN RESISTANCE: ICD-10-CM

## 2023-06-13 DIAGNOSIS — R73.01 ELEVATED FASTING GLUCOSE: ICD-10-CM

## 2023-06-13 DIAGNOSIS — N52.9 ERECTILE DYSFUNCTION, UNSPECIFIED ERECTILE DYSFUNCTION TYPE: ICD-10-CM

## 2023-06-13 DIAGNOSIS — R68.82 LOW LIBIDO: ICD-10-CM

## 2023-06-13 DIAGNOSIS — R73.09 ELEVATED HEMOGLOBIN A1C: ICD-10-CM

## 2023-06-13 DIAGNOSIS — R63.5 WEIGHT GAIN: ICD-10-CM

## 2023-06-13 DIAGNOSIS — Z83.3 FAMILY HISTORY OF TYPE 1 DIABETES MELLITUS: ICD-10-CM

## 2023-06-13 PROCEDURE — 99214 OFFICE O/P EST MOD 30 MIN: CPT | Performed by: NURSE PRACTITIONER

## 2023-06-13 RX ORDER — FLASH GLUCOSE SCANNING READER
1 EACH MISCELLANEOUS
Qty: 4 EACH | Refills: 3 | Status: SHIPPED | OUTPATIENT
Start: 2023-06-13 | End: 2023-06-20

## 2023-06-13 RX ORDER — FLASH GLUCOSE SENSOR
1 KIT MISCELLANEOUS
Qty: 4 EACH | Refills: 3 | Status: SHIPPED | OUTPATIENT
Start: 2023-06-13

## 2023-06-13 RX ORDER — SILDENAFIL 50 MG/1
50-100 TABLET, FILM COATED ORAL
Qty: 10 TABLET | Refills: 3 | Status: SHIPPED | OUTPATIENT
Start: 2023-06-13 | End: 2023-06-13

## 2023-06-13 ASSESSMENT — FIBROSIS 4 INDEX: FIB4 SCORE: 0.62

## 2023-06-13 NOTE — PROGRESS NOTES
Chief Complaint   Patient presents with    Lab Results    Follow-Up       HISTORY OF PRESENT ILLNESS: Patient is a 57 y.o. male, established patient who presents today to discuss medical problems as listed below:    Health Maintenance:  COMPLETED       Allergies: Penicillins, Tylenol, and Bactrim [sulfamethoxazole w-trimethoprim]    Current Outpatient Medications   Medication Sig Dispense Refill    Continuous Blood Gluc  (FREESTYLE KAILYN 14 DAY READER) Device 1 Application. every 14 days. 4 Each 3    Continuous Blood Gluc Sensor (FREESTYLE KAILYN 14 DAY SENSOR) Misc 1 Application. every 14 days. 4 Each 3    Continuous Blood Gluc  (FREESTYLE KAILYN 2 READER) Device 3 mos supply please 1 Each 1    Continuous Blood Gluc Sensor (FREESTYLE KAILYN 2 SENSOR) Misc 3 mos supply please 1 Each 1    fluticasone (FLONASE) 50 MCG/ACT nasal spray Administer 1 Spray into affected nostril(S) every day.       No current facility-administered medications for this visit.       Allergies, past medical history, past surgical history, family history, social history reviewed and updated.    Review of Systems:     - Constitutional: Negative for fever, chills, unexpected weight change, and fatigue/generalized weakness.      - Respiratory: Negative for cough, sputum production, chest congestion, dyspnea, wheezing, and crackles.      - Cardiovascular: Negative for chest pain, palpitations, orthopnea, and bilateral lower extremity edema.     - Psychiatric/Behavioral: Negative for depression, suicidal/homicidal ideation and memory loss.      All other systems reviewed and are negative    Exam:    BP (P) 116/74 (BP Location: Left arm, Patient Position: Sitting, BP Cuff Size: Adult)   Pulse 78   Temp 36.4 °C (97.6 °F) (Temporal)   Ht 1.829 m (6')   Wt 104 kg (229 lb 4.5 oz)   SpO2 96%   BMI 31.10 kg/m²  Body mass index is 31.1 kg/m².    Physical Exam:  Constitutional: Well-developed and well-nourished. Not diaphoretic. No  distress.   Cardiovascular: Regular rate and rhythm, S1 and S2 without murmur, rubs, or gallops.    Chest: Effort normal. Clear to auscultation throughout. No adventitious sounds. Neurological: Alert and oriented x 3.   Psychiatric:  Behavior, mood, and affect are appropriate.  MA/nursing note and vitals reviewed.    LABS: 5/2023  results reviewed and discussed with the patient, questions answered.    Assessment/Plan:  Low libido  New problem.  Patient reports 6 months ago noting decrease in libido as well as difficulty maintaining weight and muscle mass.  Would like to check testosterone as well as free T3 and thyroid.  Has tried sildenafil prescribed virtually from online provider, unsure of the dose, have not helped his symptoms.    Low HDL (under 40)   Latest Reference Range & Units 06/07/22 08:39 06/12/23 07:10   Cholesterol,Tot 100 - 199 mg/dL 179 165   Triglycerides 0 - 149 mg/dL 122 105   HDL >=40 mg/dL 43 44   LDL <100 mg/dL 112 (H) 100 (H)   (H): Data is abnormally high  Chronic problem, improved with a healthy diet.   The 10-year ASCVD risk score (Ben GARCIA, et al., 2019) is: 5.8%    Values used to calculate the score:      Age: 57 years      Sex: Male      Is Non- : No      Diabetic: No      Tobacco smoker: No      Systolic Blood Pressure: 118 mmHg      Is BP treated: No      HDL Cholesterol: 44 mg/dL      Total Cholesterol: 165 mg/dL    Elevated hemoglobin A1c   Latest Reference Range & Units 06/12/23 07:10   Glycohemoglobin 4.0 - 5.6 % 5.8 (H)   (H): Data is abnormally high  Fasting glucose is 114.  Patient admits to drinking glass of alcohol to drink the night before.  He has history of type 1 diabetes in father and grandfather.  He denies polyphagia, polydipsia or polyuria.  He would like to monitor his daily blood sugar levels.    1. Low libido  - T3 FREE; Future  - Testosterone, Free & Total, Adult Male (w/SHBG); Future  - LUTEINIZING HORMONE SERUM; Future    2. Weight  gain  - T3 FREE; Future  - Testosterone, Free & Total, Adult Male (w/SHBG); Future  - LUTEINIZING HORMONE SERUM; Future    3. Erectile dysfunction, unspecified erectile dysfunction type  Patient would like to hold off on the refills and that they will increase his dose at home.  We will follow-up next visit.    4. Insulin resistance  - Continuous Blood Gluc  (FREESTYLE KAILYN 14 DAY READER) Device; 1 Application. every 14 days.  Dispense: 4 Each; Refill: 3  - Continuous Blood Gluc Sensor (FREESTYLE KAILYN 14 DAY SENSOR) Misc; 1 Application. every 14 days.  Dispense: 4 Each; Refill: 3  - Continuous Blood Gluc  (FREESTYLE KAILYN 2 READER) Device; 3 mos supply please  Dispense: 1 Each; Refill: 1  - Continuous Blood Gluc Sensor (FREESTYLE KAILYN 2 SENSOR) Misc; 3 mos supply please  Dispense: 1 Each; Refill: 1    5. Elevated fasting glucose  - Continuous Blood Gluc  (FREESTYLE KAILYN 14 DAY READER) Device; 1 Application. every 14 days.  Dispense: 4 Each; Refill: 3  - Continuous Blood Gluc Sensor (FREESTYLE KAILYN 14 DAY SENSOR) Misc; 1 Application. every 14 days.  Dispense: 4 Each; Refill: 3  - Continuous Blood Gluc  (FREESTYLE KAILYN 2 READER) Device; 3 mos supply please  Dispense: 1 Each; Refill: 1  - Continuous Blood Gluc Sensor (FREESTYLE KAILYN 2 SENSOR) Misc; 3 mos supply please  Dispense: 1 Each; Refill: 1    6. Family history of type 1 diabetes mellitus  - Continuous Blood Gluc  (FREESTYLE KAILYN 14 DAY READER) Device; 1 Application. every 14 days.  Dispense: 4 Each; Refill: 3  - Continuous Blood Gluc Sensor (FREESTYLE KAILYN 14 DAY SENSOR) Misc; 1 Application. every 14 days.  Dispense: 4 Each; Refill: 3  - Continuous Blood Gluc  (FREESTYLE KAILYN 2 READER) Device; 3 mos supply please  Dispense: 1 Each; Refill: 1  - Continuous Blood Gluc Sensor (FREESTYLE KAILYN 2 SENSOR) Misc; 3 mos supply please  Dispense: 1 Each; Refill: 1    7. Low HDL (under 40)    8. Elevated hemoglobin  A1c  Discussed importance of healthy nutrition, healthy protein, healthy fats and fiber.  Avoid simple carbohydrates, excessive starches and alcoholic beverages.      Discussed with patient possible alternative diagnoses, patient is to take all medications as prescribed.      If symptoms persist FU w/PCP, if symptoms worsen go to emergency room.      If experiencing any side effects from prescribed medications report to the office immediately or go to emergency room.     Reviewed indication, dosage, usage and potential adverse effects of prescribed medications.      Reviewed risks and benefits of treatment plan. Patient verbalizes understanding of all instruction and verbally agrees to plan.     Discussed plan with the patient, and patient agrees to the above.      I personally reviewed prior external notes and test results pertinent to today's visit.      No follow-ups on file. 3-4 wks

## 2023-06-13 NOTE — ASSESSMENT & PLAN NOTE
Latest Reference Range & Units 06/07/22 08:39 06/12/23 07:10   Cholesterol,Tot 100 - 199 mg/dL 179 165   Triglycerides 0 - 149 mg/dL 122 105   HDL >=40 mg/dL 43 44   LDL <100 mg/dL 112 (H) 100 (H)   (H): Data is abnormally high  Chronic problem, improved with a healthy diet.   The 10-year ASCVD risk score (Ben GARCIA, et al., 2019) is: 5.8%    Values used to calculate the score:      Age: 57 years      Sex: Male      Is Non- : No      Diabetic: No      Tobacco smoker: No      Systolic Blood Pressure: 118 mmHg      Is BP treated: No      HDL Cholesterol: 44 mg/dL      Total Cholesterol: 165 mg/dL

## 2023-06-13 NOTE — ASSESSMENT & PLAN NOTE
Latest Reference Range & Units 06/12/23 07:10   Glycohemoglobin 4.0 - 5.6 % 5.8 (H)   (H): Data is abnormally high  Fasting glucose is 114.  Patient admits to drinking glass of alcohol to drink the night before.  He has history of type 1 diabetes in father and grandfather.  He denies polyphagia, polydipsia or polyuria.  He would like to monitor his daily blood sugar levels.

## 2023-06-13 NOTE — ASSESSMENT & PLAN NOTE
New problem.  Patient reports 6 months ago noting decrease in libido as well as difficulty maintaining weight and muscle mass.  Would like to check testosterone as well as free T3 and thyroid.  Has tried sildenafil prescribed virtually from online provider, unsure of the dose, have not helped his symptoms.

## 2023-06-17 DIAGNOSIS — Z83.3 FAMILY HISTORY OF TYPE 1 DIABETES MELLITUS: ICD-10-CM

## 2023-06-17 DIAGNOSIS — R73.01 ELEVATED FASTING GLUCOSE: ICD-10-CM

## 2023-06-17 DIAGNOSIS — E88.819 INSULIN RESISTANCE: ICD-10-CM

## 2023-06-19 NOTE — TELEPHONE ENCOUNTER
Received request via: Pharmacy    Was the patient seen in the last year in this department? Yes    Does the patient have an active prescription (recently filled or refills available) for medication(s) requested? No    Does the patient have prison Plus and need 100 day supply (blood pressure, diabetes and cholesterol meds only)? Patient does not have SCP    Pharmacy comment: Product Backordered/Unavailable:KAILYN 1 READER NO LONGER AVAIALBLE.

## 2023-06-20 DIAGNOSIS — Z83.3 FAMILY HISTORY OF TYPE 1 DIABETES MELLITUS: ICD-10-CM

## 2023-06-20 DIAGNOSIS — R73.01 ELEVATED FASTING GLUCOSE: ICD-10-CM

## 2023-06-20 DIAGNOSIS — E88.819 INSULIN RESISTANCE: ICD-10-CM

## 2023-06-20 RX ORDER — FLASH GLUCOSE SCANNING READER
1 EACH MISCELLANEOUS
Qty: 3 EACH | Refills: 3 | Status: SHIPPED | OUTPATIENT
Start: 2023-06-20

## 2023-06-20 RX ORDER — FLASH GLUCOSE SCANNING READER
1 EACH MISCELLANEOUS
Qty: 4 EACH | Refills: 3 | Status: SHIPPED | OUTPATIENT
Start: 2023-06-20 | End: 2023-06-20

## 2023-06-20 NOTE — TELEPHONE ENCOUNTER
Received request via: Pharmacy    Was the patient seen in the last year in this department? Yes    Does the patient have an active prescription (recently filled or refills available) for medication(s) requested? No    Does the patient have half-way Plus and need 100 day supply (blood pressure, diabetes and cholesterol meds only)? Patient does not have SCP    Pharmacy comment: Product Backordered/Unavailable:DID U MEAN SENSORS/... NEW RX.

## 2023-07-10 ENCOUNTER — HOSPITAL ENCOUNTER (OUTPATIENT)
Dept: LAB | Facility: MEDICAL CENTER | Age: 58
End: 2023-07-10
Attending: NURSE PRACTITIONER
Payer: COMMERCIAL

## 2023-07-10 DIAGNOSIS — R63.5 WEIGHT GAIN: ICD-10-CM

## 2023-07-10 DIAGNOSIS — R68.82 LOW LIBIDO: ICD-10-CM

## 2023-07-10 LAB
LH SERPL-ACNC: 8.8 IU/L (ref 1.7–8.6)
T3FREE SERPL-MCNC: 2.79 PG/ML (ref 2–4.4)

## 2023-07-10 PROCEDURE — 84402 ASSAY OF FREE TESTOSTERONE: CPT

## 2023-07-10 PROCEDURE — 84270 ASSAY OF SEX HORMONE GLOBUL: CPT

## 2023-07-10 PROCEDURE — 84481 FREE ASSAY (FT-3): CPT

## 2023-07-10 PROCEDURE — 36415 COLL VENOUS BLD VENIPUNCTURE: CPT

## 2023-07-10 PROCEDURE — 84403 ASSAY OF TOTAL TESTOSTERONE: CPT

## 2023-07-10 PROCEDURE — 83002 ASSAY OF GONADOTROPIN (LH): CPT

## 2023-07-12 ENCOUNTER — TELEMEDICINE (OUTPATIENT)
Dept: MEDICAL GROUP | Facility: MEDICAL CENTER | Age: 58
End: 2023-07-12
Payer: COMMERCIAL

## 2023-07-12 VITALS — HEIGHT: 72 IN | BODY MASS INDEX: 31.02 KG/M2 | WEIGHT: 229 LBS

## 2023-07-12 DIAGNOSIS — E29.1 HYPOGONADISM IN MALE: ICD-10-CM

## 2023-07-12 DIAGNOSIS — R79.89 LOW TESTOSTERONE IN MALE: ICD-10-CM

## 2023-07-12 LAB
SHBG SERPL-SCNC: 18 NMOL/L (ref 19–76)
TESTOST FREE MFR SERPL: 2.3 % (ref 1.6–2.9)
TESTOST FREE SERPL-MCNC: 65 PG/ML (ref 47–244)
TESTOST SERPL-MCNC: 276 NG/DL (ref 300–890)

## 2023-07-12 PROCEDURE — 99214 OFFICE O/P EST MOD 30 MIN: CPT | Mod: 95 | Performed by: NURSE PRACTITIONER

## 2023-07-12 RX ORDER — TESTOSTERONE CYPIONATE 1000 MG/10ML
100 INJECTION, SOLUTION INTRAMUSCULAR
Qty: 3 ML | Refills: 0 | Status: SHIPPED | OUTPATIENT
Start: 2023-07-12 | End: 2023-08-11 | Stop reason: SDUPTHER

## 2023-07-12 ASSESSMENT — FIBROSIS 4 INDEX: FIB4 SCORE: 0.62

## 2023-07-12 NOTE — ASSESSMENT & PLAN NOTE
Latest Reference Range & Units 07/10/23 12:13   Free Testosterone 47 - 244 pg/mL 65   Luteinizing Hormone 1.7 - 8.6 IU/L 8.8 (H)   Sex Hormone Bind Globulin 19 - 76 nmol/L 18 (L)   Testosterone % Free 1.6 - 2.9 % 2.3   Testosterone,Total 300 - 890 ng/dL 276 (L)   (H): Data is abnormally high  (L): Data is abnormally low  New problem.  Patient reports low energy, decreased libido, difficulty losing weight.  He is interested in starting testosterone therapy.  He has no family history of prostate cancer and cancer.   Latest Reference Range & Units 06/07/22 08:39 06/12/23 07:10   Prostatic Specific Antigen Tot 0.00 - 4.00 ng/mL 1.04 0.92   Discussed with patient that testosterone is considered controlled substance and he would be back in the office in 1 month to sign controlled substance agreement and provide urine drug screen once a year per protocol.  Patient denies any use of other controlled substances.  Controlled substance agreement verbally discussed with patient and will be signed at the following visit.

## 2023-07-12 NOTE — PROGRESS NOTES
"Virtual Visit: Established Patient   This visit was conducted via Zoom using secure and encrypted videoconferencing technology.   The patient was in their home in the [facility].    The patient's identity was confirmed and verbal consent was obtained for this virtual visit.     Subjective:   CC:   Chief Complaint   Patient presents with    Follow-Up     3 week f/u on blood sugar     Lab Results       Zachery Esquivel is a 57 y.o. male presenting for evaluation and management of:    Hypogonadism in male   Latest Reference Range & Units 07/10/23 12:13   Free Testosterone 47 - 244 pg/mL 65   Luteinizing Hormone 1.7 - 8.6 IU/L 8.8 (H)   Sex Hormone Bind Globulin 19 - 76 nmol/L 18 (L)   Testosterone % Free 1.6 - 2.9 % 2.3   Testosterone,Total 300 - 890 ng/dL 276 (L)   (H): Data is abnormally high  (L): Data is abnormally low  New problem.  Patient reports low energy, decreased libido, difficulty losing weight.  He is interested in starting testosterone therapy.  He has no family history of prostate cancer and cancer.   Latest Reference Range & Units 06/07/22 08:39 06/12/23 07:10   Prostatic Specific Antigen Tot 0.00 - 4.00 ng/mL 1.04 0.92   Discussed with patient that testosterone is considered controlled substance and he would be back in the office in 1 month to sign controlled substance agreement and provide urine drug screen once a year per protocol.  Patient denies any use of other controlled substances.  Controlled substance agreement verbally discussed with patient and will be signed at the following visit.     ROS     Current medicines (including changes today)  Current Outpatient Medications   Medication Sig Dispense Refill    Testosterone Cypionate 100 MG/ML Solution Inject 100 mg into the shoulder, thigh, or buttocks every 10 days for 30 days. 3 mL 0    NEEDLE, DISP, 25 G 25G X 5/8\" Misc Please dispense 3-month supply, 3 needles x90 days for vitamin B12 injections.  Thank you 3 Each 3    Continuous " Blood Gluc  (FREESTYLE KAILYN 14 DAY READER) Device 1 APPLICATION. EVERY 14 DAYS. 3 Each 3    Continuous Blood Gluc Sensor (FREESTYLE KAILYN 14 DAY SENSOR) Misc 1 Application. every 14 days. 4 Each 3    Continuous Blood Gluc  (FREESTYLE KAILYN 2 READER) Device 3 mos supply please 1 Each 1    Continuous Blood Gluc Sensor (FREESTYLE KAILYN 2 SENSOR) Misc 3 mos supply please 1 Each 1    fluticasone (FLONASE) 50 MCG/ACT nasal spray Administer 1 Spray into affected nostril(S) every day.       No current facility-administered medications for this visit.       Patient Active Problem List    Diagnosis Date Noted    Hypogonadism in male 07/12/2023    Low libido 06/13/2023    Elevated hemoglobin A1c 06/13/2023    Seasonal allergies 04/12/2023    Displacement of lumbar intervertebral disc without myelopathy 04/12/2023    Trigger point of shoulder region 04/12/2023    Abdominal cramping 04/12/2023    Low HDL (under 40) 06/07/2022    Chronic bilateral low back pain without sciatica 06/07/2022    IFG (impaired fasting glucose) 10/13/2020    Elevated LFTs 09/29/2020    Obesity (BMI 30-39.9) 01/09/2018    Polyuria 06/28/2015        Objective:   Ht 1.829 m (6') Comment: pt stated  Wt 104 kg (229 lb) Comment: pt stated  BMI 31.06 kg/m²     Physical Exam:  Constitutional: Alert, no distress, well-groomed.  Skin: No rashes in visible areas.  Eye: Round. Conjunctiva clear, lids normal. No icterus.   ENMT: Lips pink without lesions, good dentition, moist mucous membranes. Phonation normal.  Neck: No masses, no thyromegaly. Moves freely without pain.  Respiratory: Unlabored respiratory effort, no cough or audible wheeze  Psych: Alert and oriented x3, normal affect and mood.     Assessment and Plan:   The following treatment plan was discussed:   1. Hypogonadism in male  Uncontrolled, stable.  Trial of testosterone injections.  We will follow-up in 1 month.  Controlled substance agreement and urine drug screen will be  "obtained next visit.  Follow-up with discussion of controlled substance agreement completed today.  - Testosterone Cypionate 100 MG/ML Solution; Inject 100 mg into the shoulder, thigh, or buttocks every 10 days for 30 days.  Dispense: 3 mL; Refill: 0  - NEEDLE, DISP, 25 G 25G X 5/8\" Misc; Please dispense 3-month supply, 3 needles x90 days for vitamin B12 injections.  Thank you  Dispense: 3 Each; Refill: 3    2. Low testosterone in male  As discussed in 1  - Testosterone Cypionate 100 MG/ML Solution; Inject 100 mg into the shoulder, thigh, or buttocks every 10 days for 30 days.  Dispense: 3 mL; Refill: 0  - NEEDLE, DISP, 25 G 25G X 5/8\" Misc; Please dispense 3-month supply, 3 needles x90 days for vitamin B12 injections.  Thank you  Dispense: 3 Each; Refill: 3       Discussed with patient possible alternative diagnoses, patient is to take all medications as prescribed.      If symptoms persist FU w/PCP, if symptoms worsen go to emergency room.      If experiencing any side effects from prescribed medications report to the office immediately or go to emergency room.     Reviewed indication, dosage, usage and potential adverse effects of prescribed medications.      Reviewed risks and benefits of treatment plan. Patient verbalizes understanding of all instruction and verbally agrees to plan.     Discussed plan with the patient, and patient agrees to the above.      I personally reviewed prior external notes and test results pertinent to today's visit.     Follow-up: 4 wks         "

## 2023-08-11 ENCOUNTER — TELEPHONE (OUTPATIENT)
Dept: MEDICAL GROUP | Facility: MEDICAL CENTER | Age: 58
End: 2023-08-11
Payer: COMMERCIAL

## 2023-08-11 DIAGNOSIS — R79.89 LOW TESTOSTERONE IN MALE: ICD-10-CM

## 2023-08-11 DIAGNOSIS — E29.1 HYPOGONADISM IN MALE: ICD-10-CM

## 2023-08-11 RX ORDER — TESTOSTERONE CYPIONATE 1000 MG/10ML
100 INJECTION, SOLUTION INTRAMUSCULAR
Qty: 3 ML | Refills: 0 | Status: SHIPPED | OUTPATIENT
Start: 2023-08-11 | End: 2023-09-10

## 2023-08-11 NOTE — TELEPHONE ENCOUNTER
FINAL PRIOR AUTHORIZATION STATUS:    1.  Name of Medication & Dose: Testosterone Cypionate 100 MG/ML Solution    2. Prior Auth Status: Approved through 8/4/24     3. Action Taken: Pharmacy Notified: yes Patient Notified: yes

## 2023-10-12 ENCOUNTER — TELEMEDICINE (OUTPATIENT)
Dept: MEDICAL GROUP | Facility: MEDICAL CENTER | Age: 58
End: 2023-10-12
Payer: COMMERCIAL

## 2023-10-12 DIAGNOSIS — R79.89 LOW TESTOSTERONE IN MALE: ICD-10-CM

## 2023-10-12 DIAGNOSIS — L98.9 SKIN LESION OF FACE: ICD-10-CM

## 2023-10-12 DIAGNOSIS — E29.1 HYPOGONADISM IN MALE: ICD-10-CM

## 2023-10-12 PROCEDURE — 99214 OFFICE O/P EST MOD 30 MIN: CPT | Mod: 95 | Performed by: NURSE PRACTITIONER

## 2023-10-12 RX ORDER — TESTOSTERONE CYPIONATE 200 MG/ML
100 INJECTION, SOLUTION INTRAMUSCULAR
Qty: 12 ML | Refills: 0 | Status: SHIPPED | OUTPATIENT
Start: 2023-10-12 | End: 2024-01-04

## 2023-10-12 NOTE — ASSESSMENT & PLAN NOTE
New problem. Skin lesion on right forehead, described as abrasion , flat, dry, burning sensation. No itching, no bleeding . No Hx of skin ca.

## 2023-10-12 NOTE — ASSESSMENT & PLAN NOTE
Latest Reference Range & Units 07/10/23 12:13   Free Testosterone 47 - 244 pg/mL 65   Luteinizing Hormone 1.7 - 8.6 IU/L 8.8 (H)   Sex Hormone Bind Globulin 19 - 76 nmol/L 18 (L)   Testosterone % Free 1.6 - 2.9 % 2.3   Testosterone,Total 300 - 890 ng/dL 276 (L)   (H): Data is abnormally high  (L): Data is abnormally low  Chronic problem. Started on T 100 mg  q 10 days.   Tolerating well, no BP. No improvement in s/s.   Latest Reference Range & Units 06/07/22 08:39 06/12/23 07:10   Prostatic Specific Antigen Tot 0.00 - 4.00 ng/mL 1.04 0.92

## 2023-10-13 NOTE — PROGRESS NOTES
"Virtual Visit: Established Patient   This visit was conducted via Zoom using secure and encrypted videoconferencing technology.   The patient was in their home in the [facility].    The patient's identity was confirmed and verbal consent was obtained for this virtual visit.   This evaluation was conducted via Zoom using secure and encrypted videoconferencing technology. The patient was in their home in the [facility].    The patient's identity was confirmed and verbal consent was obtained for this virtual visit.     Subjective:   CC: No chief complaint on file.      Zachery Esquivel is a 57 y.o. male presenting for evaluation and management of:    Hypogonadism in male   Latest Reference Range & Units 07/10/23 12:13   Free Testosterone 47 - 244 pg/mL 65   Luteinizing Hormone 1.7 - 8.6 IU/L 8.8 (H)   Sex Hormone Bind Globulin 19 - 76 nmol/L 18 (L)   Testosterone % Free 1.6 - 2.9 % 2.3   Testosterone,Total 300 - 890 ng/dL 276 (L)   (H): Data is abnormally high  (L): Data is abnormally low  Chronic problem. Started on T 100 mg  q 10 days.   Tolerating well, no BP. No improvement in s/s.   Latest Reference Range & Units 06/07/22 08:39 06/12/23 07:10   Prostatic Specific Antigen Tot 0.00 - 4.00 ng/mL 1.04 0.92           Skin lesion of face  New problem. Skin lesion on right forehead, described as abrasion , flat, dry, burning sensation. No itching, no bleeding . No Hx of skin ca.      ROS     Current medicines (including changes today)  Current Outpatient Medications   Medication Sig Dispense Refill    testosterone cypionate (DEPO-TESTOSTERONE) 200 MG/ML injection Inject 0.5 mL into the shoulder, thigh, or buttocks two times a week for 84 days. 12 mL 0    NEEDLE, DISP, 25 G 25G X 5/8\" Misc Please dispense 3-month supply, 3 needles x90 days for vitamin B12 injections.  Thank you 3 Each 3    Continuous Blood Gluc  (Trendy MondaysYLE KAILYN 14 DAY READER) Device 1 APPLICATION. EVERY 14 DAYS. 3 Each 3    " Continuous Blood Gluc Sensor (FREESTYLE KAILYN 14 DAY SENSOR) Mis 1 Application. every 14 days. 4 Each 3    Continuous Blood Gluc  (FREESTYLE KAILYN 2 READER) Device 3 mos supply please 1 Each 1    Continuous Blood Gluc Sensor (FREESTYLE KAILYN 2 SENSOR) Misc 3 mos supply please 1 Each 1    fluticasone (FLONASE) 50 MCG/ACT nasal spray Administer 1 Spray into affected nostril(S) every day.       No current facility-administered medications for this visit.       Patient Active Problem List    Diagnosis Date Noted    Skin lesion of face 10/12/2023    Hypogonadism in male 07/12/2023    Low libido 06/13/2023    Elevated hemoglobin A1c 06/13/2023    Seasonal allergies 04/12/2023    Displacement of lumbar intervertebral disc without myelopathy 04/12/2023    Trigger point of shoulder region 04/12/2023    Abdominal cramping 04/12/2023    Low HDL (under 40) 06/07/2022    Chronic bilateral low back pain without sciatica 06/07/2022    IFG (impaired fasting glucose) 10/13/2020    Elevated LFTs 09/29/2020    Obesity (BMI 30-39.9) 01/09/2018    Polyuria 06/28/2015        Objective:   There were no vitals taken for this visit.    Physical Exam:  Constitutional: Alert, no distress, well-groomed.  Skin: No rashes in visible areas.  Eye: Round. Conjunctiva clear, lids normal. No icterus.   ENMT: Lips pink without lesions, good dentition, moist mucous membranes. Phonation normal.  Neck: No masses, no thyromegaly. Moves freely without pain.  Respiratory: Unlabored respiratory effort, no cough or audible wheeze  Psych: Alert and oriented x3, normal affect and mood.     Assessment and Plan:   The following treatment plan was discussed:   1. Hypogonadism in male  Uncontrolled, stable.  Tolerating well.  Will increase to 100 mg twice a week and follow-up in 3 months.  - FSH; Future  - Testosterone, Free & Total, Adult Male (w/SHBG); Future  - LUTEINIZING HORMONE SERUM; Future  - ESTRADIOL; Future  - Controlled Substance Treatment  Agreement  - URINE DRUG SCREEN  - testosterone cypionate (DEPO-TESTOSTERONE) 200 MG/ML injection; Inject 0.5 mL into the shoulder, thigh, or buttocks two times a week for 84 days.  Dispense: 12 mL; Refill: 0    2. Low testosterone in male  - FSH; Future  - Testosterone, Free & Total, Adult Male (w/SHBG); Future  - LUTEINIZING HORMONE SERUM; Future  - ESTRADIOL; Future  - Controlled Substance Treatment Agreement  - URINE DRUG SCREEN  - testosterone cypionate (DEPO-TESTOSTERONE) 200 MG/ML injection; Inject 0.5 mL into the shoulder, thigh, or buttocks two times a week for 84 days.  Dispense: 12 mL; Refill: 0    3. Skin lesion of face  - Referral to Dermatology       Discussed with patient possible alternative diagnoses, patient is to take all medications as prescribed.      If symptoms persist FU w/PCP, if symptoms worsen go to emergency room.      If experiencing any side effects from prescribed medications report to the office immediately or go to emergency room.     Reviewed indication, dosage, usage and potential adverse effects of prescribed medications.      Reviewed risks and benefits of treatment plan. Patient verbalizes understanding of all instruction and verbally agrees to plan.     Discussed plan with the patient, and patient agrees to the above.      I personally reviewed prior external notes and test results pertinent to today's visit.     Follow-up: 3 mo

## 2023-10-16 ENCOUNTER — APPOINTMENT (RX ONLY)
Dept: URBAN - METROPOLITAN AREA CLINIC 15 | Facility: CLINIC | Age: 58
Setting detail: DERMATOLOGY
End: 2023-10-16

## 2023-10-16 DIAGNOSIS — L81.4 OTHER MELANIN HYPERPIGMENTATION: ICD-10-CM

## 2023-10-16 DIAGNOSIS — L57.8 OTHER SKIN CHANGES DUE TO CHRONIC EXPOSURE TO NONIONIZING RADIATION: ICD-10-CM

## 2023-10-16 DIAGNOSIS — D22 MELANOCYTIC NEVI: ICD-10-CM

## 2023-10-16 DIAGNOSIS — L98.8 OTHER SPECIFIED DISORDERS OF THE SKIN AND SUBCUTANEOUS TISSUE: ICD-10-CM | Status: INADEQUATELY CONTROLLED

## 2023-10-16 DIAGNOSIS — L57.0 ACTINIC KERATOSIS: ICD-10-CM

## 2023-10-16 DIAGNOSIS — L71.1 RHINOPHYMA: ICD-10-CM | Status: INADEQUATELY CONTROLLED

## 2023-10-16 DIAGNOSIS — D18.0 HEMANGIOMA: ICD-10-CM

## 2023-10-16 DIAGNOSIS — L82.1 OTHER SEBORRHEIC KERATOSIS: ICD-10-CM

## 2023-10-16 PROBLEM — D18.01 HEMANGIOMA OF SKIN AND SUBCUTANEOUS TISSUE: Status: ACTIVE | Noted: 2023-10-16

## 2023-10-16 PROBLEM — D22.5 MELANOCYTIC NEVI OF TRUNK: Status: ACTIVE | Noted: 2023-10-16

## 2023-10-16 PROBLEM — D22.71 MELANOCYTIC NEVI OF RIGHT LOWER LIMB, INCLUDING HIP: Status: ACTIVE | Noted: 2023-10-16

## 2023-10-16 PROBLEM — D22.61 MELANOCYTIC NEVI OF RIGHT UPPER LIMB, INCLUDING SHOULDER: Status: ACTIVE | Noted: 2023-10-16

## 2023-10-16 PROBLEM — D22.72 MELANOCYTIC NEVI OF LEFT LOWER LIMB, INCLUDING HIP: Status: ACTIVE | Noted: 2023-10-16

## 2023-10-16 PROBLEM — D22.62 MELANOCYTIC NEVI OF LEFT UPPER LIMB, INCLUDING SHOULDER: Status: ACTIVE | Noted: 2023-10-16

## 2023-10-16 PROCEDURE — ? PRESCRIPTION MEDICATION MANAGEMENT

## 2023-10-16 PROCEDURE — ? MEDICATION COUNSELING

## 2023-10-16 PROCEDURE — 17003 DESTRUCT PREMALG LES 2-14: CPT

## 2023-10-16 PROCEDURE — 99204 OFFICE O/P NEW MOD 45 MIN: CPT | Mod: 25

## 2023-10-16 PROCEDURE — ? PRESCRIPTION

## 2023-10-16 PROCEDURE — ? COUNSELING

## 2023-10-16 PROCEDURE — ? LIQUID NITROGEN

## 2023-10-16 PROCEDURE — 17000 DESTRUCT PREMALG LESION: CPT

## 2023-10-16 RX ORDER — DOXYCYCLINE 100 MG/1
CAPSULE ORAL
Qty: 60 | Refills: 0 | Status: ERX | COMMUNITY
Start: 2023-10-16

## 2023-10-16 RX ADMIN — DOXYCYCLINE: 100 CAPSULE ORAL at 00:00

## 2023-10-16 ASSESSMENT — LOCATION DETAILED DESCRIPTION DERM
LOCATION DETAILED: MID-OCCIPITAL SCALP
LOCATION DETAILED: RIGHT ANTERIOR PROXIMAL THIGH
LOCATION DETAILED: RIGHT PROXIMAL DORSAL FOREARM
LOCATION DETAILED: LEFT DISTAL POSTERIOR THIGH
LOCATION DETAILED: RIGHT LATERAL FOREHEAD
LOCATION DETAILED: LEFT ANTERIOR DISTAL UPPER ARM
LOCATION DETAILED: RIGHT VENTRAL PROXIMAL FOREARM
LOCATION DETAILED: RIGHT MID-UPPER BACK
LOCATION DETAILED: LEFT PROXIMAL CALF
LOCATION DETAILED: LEFT ANTERIOR PROXIMAL THIGH
LOCATION DETAILED: RIGHT VENTRAL DISTAL FOREARM
LOCATION DETAILED: LEFT VENTRAL DISTAL FOREARM
LOCATION DETAILED: RIGHT MEDIAL INFERIOR CHEST
LOCATION DETAILED: RIGHT ANTERIOR DISTAL UPPER ARM
LOCATION DETAILED: NASAL SUPRATIP
LOCATION DETAILED: LEFT CENTRAL MALAR CHEEK
LOCATION DETAILED: LEFT MEDIAL FRONTAL SCALP
LOCATION DETAILED: RIGHT PROXIMAL CALF
LOCATION DETAILED: LEFT INFERIOR UPPER BACK
LOCATION DETAILED: RIGHT SUPERIOR LATERAL MIDBACK
LOCATION DETAILED: RIGHT DISTAL POSTERIOR THIGH
LOCATION DETAILED: LEFT PROXIMAL DORSAL FOREARM

## 2023-10-16 ASSESSMENT — LOCATION SIMPLE DESCRIPTION DERM
LOCATION SIMPLE: LEFT UPPER BACK
LOCATION SIMPLE: LEFT POSTERIOR THIGH
LOCATION SIMPLE: RIGHT FOREARM
LOCATION SIMPLE: POSTERIOR SCALP
LOCATION SIMPLE: RIGHT FOREHEAD
LOCATION SIMPLE: RIGHT LOWER BACK
LOCATION SIMPLE: LEFT CHEEK
LOCATION SIMPLE: LEFT FOREARM
LOCATION SIMPLE: RIGHT UPPER ARM
LOCATION SIMPLE: CHEST
LOCATION SIMPLE: RIGHT CALF
LOCATION SIMPLE: NOSE
LOCATION SIMPLE: RIGHT THIGH
LOCATION SIMPLE: LEFT SCALP
LOCATION SIMPLE: RIGHT UPPER BACK
LOCATION SIMPLE: LEFT THIGH
LOCATION SIMPLE: RIGHT POSTERIOR THIGH
LOCATION SIMPLE: LEFT CALF
LOCATION SIMPLE: LEFT UPPER ARM

## 2023-10-16 ASSESSMENT — LOCATION ZONE DERM
LOCATION ZONE: LEG
LOCATION ZONE: ARM
LOCATION ZONE: SCALP
LOCATION ZONE: NOSE
LOCATION ZONE: FACE
LOCATION ZONE: TRUNK

## 2023-10-16 NOTE — PROCEDURE: PRESCRIPTION MEDICATION MANAGEMENT
Render In Strict Bullet Format?: No
Plan: Patient is taking a round of doxy for folliculitis. If improving will consider a lower dose for a longer period of time
Detail Level: Zone

## 2023-10-16 NOTE — PROCEDURE: MEDICATION COUNSELING
Azelaic Acid Pregnancy And Lactation Text: This medication is considered safe during pregnancy and breast feeding.
Zoryve Counseling:  I discussed with the patient that Zoryve is not for use in the eyes, mouth or vagina. The most commonly reported side effects include diarrhea, headache, insomnia, application site pain, upper respiratory tract infections, and urinary tract infections.  All of the patient's questions and concerns were addressed.
Doxepin Counseling:  Patient advised that the medication is sedating and not to drive a car after taking this medication. Patient informed of potential adverse effects including but not limited to dry mouth, urinary retention, and blurry vision.  The patient verbalized understanding of the proper use and possible adverse effects of doxepin.  All of the patient's questions and concerns were addressed.
Bexarotene Counseling:  I discussed with the patient the risks of bexarotene including but not limited to hair loss, dry lips/skin/eyes, liver abnormalities, hyperlipidemia, pancreatitis, depression/suicidal ideation, photosensitivity, drug rash/allergic reactions, hypothyroidism, anemia, leukopenia, infection, cataracts, and teratogenicity.  Patient understands that they will need regular blood tests to check lipid profile, liver function tests, white blood cell count, thyroid function tests and pregnancy test if applicable.
Elidel Pregnancy And Lactation Text: This medication is Pregnancy Category C. It is unknown if this medication is excreted in breast milk.
Siliq Pregnancy And Lactation Text: The risk during pregnancy and breastfeeding is uncertain with this medication.
Qbrexza Counseling:  I discussed with the patient the risks of Qbrexza including but not limited to headache, mydriasis, blurred vision, dry eyes, nasal dryness, dry mouth, dry throat, dry skin, urinary hesitation, and constipation.  Local skin reactions including erythema, burning, stinging, and itching can also occur.
Nsaids Pregnancy And Lactation Text: These medications are considered safe up to 30 weeks gestation. It is excreted in breast milk.
Topical Steroids Applications Pregnancy And Lactation Text: Most topical steroids are considered safe to use during pregnancy and lactation.  Any topical steroid applied to the breast or nipple should be washed off before breastfeeding.
Clindamycin Pregnancy And Lactation Text: This medication can be used in pregnancy if certain situations. Clindamycin is also present in breast milk.
Spironolactone Counseling: Patient advised regarding risks of diarrhea, abdominal pain, hyperkalemia, birth defects (for female patients), liver toxicity and renal toxicity. The patient may need blood work to monitor liver and kidney function and potassium levels while on therapy. The patient verbalized understanding of the proper use and possible adverse effects of spironolactone.  All of the patient's questions and concerns were addressed.
Libtayo Pregnancy And Lactation Text: This medication is contraindicated in pregnancy and when breast feeding.
Use Enhanced Medication Counseling?: No
Rifampin Pregnancy And Lactation Text: This medication is Pregnancy Category C and it isn't know if it is safe during pregnancy. It is also excreted in breast milk and should not be used if you are breast feeding.
Cyclosporine Pregnancy And Lactation Text: This medication is Pregnancy Category C and it isn't know if it is safe during pregnancy. This medication is excreted in breast milk.
Humira Counseling:  I discussed with the patient the risks of adalimumab including but not limited to myelosuppression, immunosuppression, autoimmune hepatitis, demyelinating diseases, lymphoma, and serious infections.  The patient understands that monitoring is required including a PPD at baseline and must alert us or the primary physician if symptoms of infection or other concerning signs are noted.
Glycopyrrolate Counseling:  I discussed with the patient the risks of glycopyrrolate including but not limited to skin rash, drowsiness, dry mouth, difficulty urinating, and blurred vision.
Minoxidil Pregnancy And Lactation Text: This medication has not been assigned a Pregnancy Risk Category but animal studies failed to show danger with the topical medication. It is unknown if the medication is excreted in breast milk.
Bexarotene Pregnancy And Lactation Text: This medication is Pregnancy Category X and should not be given to women who are pregnant or may become pregnant. This medication should not be used if you are breast feeding.
Adbry Pregnancy And Lactation Text: It is unknown if this medication will adversely affect pregnancy or breast feeding.
Rinvoq Counseling: I discussed with the patient the risks of Rinvoq therapy including but not limited to upper respiratory tract infections, shingles, cold sores, bronchitis, nausea, cough, fever, acne, and headache. Live vaccines should be avoided.  This medication has been linked to serious infections; higher rate of mortality; malignancy and lymphoproliferative disorders; major adverse cardiovascular events; thrombosis; thrombocytopenia, anemia, and neutropenia; lipid elevations; liver enzyme elevations; and gastrointestinal perforations.
Clofazimine Pregnancy And Lactation Text: This medication is Pregnancy Category C and isn't considered safe during pregnancy. It is excreted in breast milk.
Tazorac Counseling:  Patient advised that medication is irritating and drying.  Patient may need to apply sparingly and wash off after an hour before eventually leaving it on overnight.  The patient verbalized understanding of the proper use and possible adverse effects of tazorac.  All of the patient's questions and concerns were addressed.
Propranolol Pregnancy And Lactation Text: This medication is Pregnancy Category C and it isn't known if it is safe during pregnancy. It is excreted in breast milk.
Itraconazole Counseling:  I discussed with the patient the risks of itraconazole including but not limited to liver damage, nausea/vomiting, neuropathy, and severe allergy.  The patient understands that this medication is best absorbed when taken with acidic beverages such as non-diet cola or ginger ale.  The patient understands that monitoring is required including baseline LFTs and repeat LFTs at intervals.  The patient understands that they are to contact us or the primary physician if concerning signs are noted.
Benzoyl Peroxide Counseling: Patient counseled that medicine may cause skin irritation and bleach clothing.  In the event of skin irritation, the patient was advised to reduce the amount of the drug applied or use it less frequently.   The patient verbalized understanding of the proper use and possible adverse effects of benzoyl peroxide.  All of the patient's questions and concerns were addressed.
Eucrisa Counseling: Patient may experience a mild burning sensation during topical application. Eucrisa is not approved in children less than 3 months of age.
Simponi Counseling:  I discussed with the patient the risks of golimumab including but not limited to myelosuppression, immunosuppression, autoimmune hepatitis, demyelinating diseases, lymphoma, and serious infections.  The patient understands that monitoring is required including a PPD at baseline and must alert us or the primary physician if symptoms of infection or other concerning signs are noted.
Qbrexza Pregnancy And Lactation Text: There is no available data on Qbrexza use in pregnant women.  There is no available data on Qbrexza use in lactation.
Odomzo Counseling- I discussed with the patient the risks of Odomzo including but not limited to nausea, vomiting, diarrhea, constipation, weight loss, changes in the sense of taste, decreased appetite, muscle spasms, and hair loss.  The patient verbalized understanding of the proper use and possible adverse effects of Odomzo.  All of the patient's questions and concerns were addressed.
Doxepin Pregnancy And Lactation Text: This medication is Pregnancy Category C and it isn't known if it is safe during pregnancy. It is also excreted in breast milk and breast feeding isn't recommended.
Zoryve Pregnancy And Lactation Text: It is unknown if this medication can cause problems during pregnancy and breastfeeding.
Doxycycline Counseling:  Patient counseled regarding possible photosensitivity and increased risk for sunburn.  Patient instructed to avoid sunlight, if possible.  When exposed to sunlight, patients should wear protective clothing, sunglasses, and sunscreen.  The patient was instructed to call the office immediately if the following severe adverse effects occur:  hearing changes, easy bruising/bleeding, severe headache, or vision changes.  The patient verbalized understanding of the proper use and possible adverse effects of doxycycline.  All of the patient's questions and concerns were addressed.
Elidel Counseling: Patient may experience a mild burning sensation during topical application. Elidel is not approved in children less than 2 years of age. There have been case reports of hematologic and skin malignancies in patients using topical calcineurin inhibitors although causality is questionable.
Olanzapine Counseling- I discussed with the patient the common side effects of olanzapine including but are not limited to: lack of energy, dry mouth, increased appetite, sleepiness, tremor, constipation, dizziness, changes in behavior, or restlessness.  Explained that teenagers are more likely to experience headaches, abdominal pain, pain in the arms or legs, tiredness, and sleepiness.  Serious side effects include but are not limited: increased risk of death in elderly patients who are confused, have memory loss, or dementia-related psychosis; hyperglycemia; increased cholesterol and triglycerides; and weight gain.
Spironolactone Pregnancy And Lactation Text: This medication can cause feminization of the male fetus and should be avoided during pregnancy. The active metabolite is also found in breast milk.
Humira Pregnancy And Lactation Text: This medication is Pregnancy Category B and is considered safe during pregnancy. It is unknown if this medication is excreted in breast milk.
Topical Sulfur Applications Counseling: Topical Sulfur Counseling: Patient counseled that this medication may cause skin irritation or allergic reactions.  In the event of skin irritation, the patient was advised to reduce the amount of the drug applied or use it less frequently.   The patient verbalized understanding of the proper use and possible adverse effects of topical sulfur application.  All of the patient's questions and concerns were addressed.
Sarecycline Counseling: Patient advised regarding possible photosensitivity and discoloration of the teeth, skin, lips, tongue and gums.  Patient instructed to avoid sunlight, if possible.  When exposed to sunlight, patients should wear protective clothing, sunglasses, and sunscreen.  The patient was instructed to call the office immediately if the following severe adverse effects occur:  hearing changes, easy bruising/bleeding, severe headache, or vision changes.  The patient verbalized understanding of the proper use and possible adverse effects of sarecycline.  All of the patient's questions and concerns were addressed.
Rinvoq Pregnancy And Lactation Text: Based on animal studies, Rinvoq may cause embryo-fetal harm when administered to pregnant women.  The medication should not be used in pregnancy.  Breastfeeding is not recommended during treatment and for 6 days after the last dose.
Colchicine Counseling:  Patient counseled regarding adverse effects including but not limited to stomach upset (nausea, vomiting, stomach pain, or diarrhea).  Patient instructed to limit alcohol consumption while taking this medication.  Colchicine may reduce blood counts especially with prolonged use.  The patient understands that monitoring of kidney function and blood counts may be required, especially at baseline. The patient verbalized understanding of the proper use and possible adverse effects of colchicine.  All of the patient's questions and concerns were addressed.
Mirvaso Counseling: Mirvaso is a topical medication which can decrease superficial blood flow where applied. Side effects are uncommon and include stinging, redness and allergic reactions.
Methotrexate Counseling:  Patient counseled regarding adverse effects of methotrexate including but not limited to nausea, vomiting, abnormalities in liver function tests. Patients may develop mouth sores, rash, diarrhea, and abnormalities in blood counts. The patient understands that monitoring is required including LFT's and blood counts.  There is a rare possibility of scarring of the liver and lung problems that can occur when taking methotrexate. Persistent nausea, loss of appetite, pale stools, dark urine, cough, and shortness of breath should be reported immediately. Patient advised to discontinue methotrexate treatment at least three months before attempting to become pregnant.  I discussed the need for folate supplements while taking methotrexate.  These supplements can decrease side effects during methotrexate treatment. The patient verbalized understanding of the proper use and possible adverse effects of methotrexate.  All of the patient's questions and concerns were addressed.
Benzoyl Peroxide Pregnancy And Lactation Text: This medication is Pregnancy Category C. It is unknown if benzoyl peroxide is excreted in breast milk.
Isotretinoin Counseling: Patient should get monthly blood tests, not donate blood, not drive at night if vision affected, not share medication, and not undergo elective surgery for 6 months after tx completed. Side effects reviewed, pt to contact office should one occur.
Itraconazole Pregnancy And Lactation Text: This medication is Pregnancy Category C and it isn't know if it is safe during pregnancy. It is also excreted in breast milk.
Glycopyrrolate Pregnancy And Lactation Text: This medication is Pregnancy Category B and is considered safe during pregnancy. It is unknown if it is excreted breast milk.
Cimzia Counseling:  I discussed with the patient the risks of Cimzia including but not limited to immunosuppression, allergic reactions and infections.  The patient understands that monitoring is required including a PPD at baseline and must alert us or the primary physician if symptoms of infection or other concerning signs are noted.
Doxycycline Pregnancy And Lactation Text: This medication is Pregnancy Category D and not consider safe during pregnancy. It is also excreted in breast milk but is considered safe for shorter treatment courses.
SSKI Counseling:  I discussed with the patient the risks of SSKI including but not limited to thyroid abnormalities, metallic taste, GI upset, fever, headache, acne, arthralgias, paraesthesias, lymphadenopathy, easy bleeding, arrhythmias, and allergic reaction.
Tazorac Pregnancy And Lactation Text: This medication is not safe during pregnancy. It is unknown if this medication is excreted in breast milk.
Zyclara Counseling:  I discussed with the patient the risks of imiquimod including but not limited to erythema, scaling, itching, weeping, crusting, and pain.  Patient understands that the inflammatory response to imiquimod is variable from person to person and was educated regarded proper titration schedule.  If flu-like symptoms develop, patient knows to discontinue the medication and contact us.
Hydroxyzine Counseling: Patient advised that the medication is sedating and not to drive a car after taking this medication.  Patient informed of potential adverse effects including but not limited to dry mouth, urinary retention, and blurry vision.  The patient verbalized understanding of the proper use and possible adverse effects of hydroxyzine.  All of the patient's questions and concerns were addressed.
Odomzo Pregnancy And Lactation Text: This medication is Pregnancy Category X and is absolutely contraindicated during pregnancy. It is unknown if it is excreted in breast milk.
Rhofade Counseling: Rhofade is a topical medication which can decrease superficial blood flow where applied. Side effects are uncommon and include stinging, redness and allergic reactions.
Olanzapine Pregnancy And Lactation Text: This medication is pregnancy category C.   There are no adequate and well controlled trials with olanzapine in pregnant females.  Olanzapine should be used during pregnancy only if the potential benefit justifies the potential risk to the fetus.   In a study in lactating healthy women, olanzapine was excreted in breast milk.  It is recommended that women taking olanzapine should not breast feed.
Topical Sulfur Applications Pregnancy And Lactation Text: This medication is considered safe during pregnancy and breast feeding secondary to limited systemic absorption.
Sarecycline Pregnancy And Lactation Text: This medication is Pregnancy Category D and not consider safe during pregnancy. It is also excreted in breast milk.
Azithromycin Counseling:  I discussed with the patient the risks of azithromycin including but not limited to GI upset, allergic reaction, drug rash, diarrhea, and yeast infections.
Ilumya Counseling: I discussed with the patient the risks of tildrakizumab including but not limited to immunosuppression, malignancy, posterior leukoencephalopathy syndrome, and serious infections.  The patient understands that monitoring is required including a PPD at baseline and must alert us or the primary physician if symptoms of infection or other concerning signs are noted.
Sotyktu Counseling:  I discussed the most common side effects of Sotyktu including: common cold, sore throat, sinus infections, cold sores, canker sores, folliculitis, and acne.  I also discussed more serious side effects of Sotyktu including but not limited to: serious allergic reactions; increased risk for infections such as TB; cancers such as lymphomas; rhabdomyolysis and elevated CPK; and elevated triglycerides and liver enzymes. 
Methotrexate Pregnancy And Lactation Text: This medication is Pregnancy Category X and is known to cause fetal harm. This medication is excreted in breast milk.
Mirvaso Pregnancy And Lactation Text: This medication has not been assigned a Pregnancy Risk Category. It is unknown if the medication is excreted in breast milk.
Ketoconazole Counseling:   Patient counseled regarding improving absorption with orange juice.  Adverse effects include but are not limited to breast enlargement, headache, diarrhea, nausea, upset stomach, liver function test abnormalities, taste disturbance, and stomach pain.  There is a rare possibility of liver failure that can occur when taking ketoconazole. The patient understands that monitoring of LFTs may be required, especially at baseline. The patient verbalized understanding of the proper use and possible adverse effects of ketoconazole.  All of the patient's questions and concerns were addressed.
Hydroxychloroquine Counseling:  I discussed with the patient that a baseline ophthalmologic exam is needed at the start of therapy and every year thereafter while on therapy. A CBC may also be warranted for monitoring.  The side effects of this medication were discussed with the patient, including but not limited to agranulocytosis, aplastic anemia, seizures, rashes, retinopathy, and liver toxicity. Patient instructed to call the office should any adverse effect occur.  The patient verbalized understanding of the proper use and possible adverse effects of Plaquenil.  All the patient's questions and concerns were addressed.
Isotretinoin Pregnancy And Lactation Text: This medication is Pregnancy Category X and is considered extremely dangerous during pregnancy. It is unknown if it is excreted in breast milk.
Azathioprine Counseling:  I discussed with the patient the risks of azathioprine including but not limited to myelosuppression, immunosuppression, hepatotoxicity, lymphoma, and infections.  The patient understands that monitoring is required including baseline LFTs, Creatinine, possible TPMP genotyping and weekly CBCs for the first month and then every 2 weeks thereafter.  The patient verbalized understanding of the proper use and possible adverse effects of azathioprine.  All of the patient's questions and concerns were addressed.
Topical Clindamycin Counseling: Patient counseled that this medication may cause skin irritation or allergic reactions.  In the event of skin irritation, the patient was advised to reduce the amount of the drug applied or use it less frequently.   The patient verbalized understanding of the proper use and possible adverse effects of clindamycin.  All of the patient's questions and concerns were addressed.
Metronidazole Counseling:  I discussed with the patient the risks of metronidazole including but not limited to seizures, nausea/vomiting, a metallic taste in the mouth, nausea/vomiting and severe allergy.
Cimzia Pregnancy And Lactation Text: This medication crosses the placenta but can be considered safe in certain situations. Cimzia may be excreted in breast milk.
Carac Counseling:  I discussed with the patient the risks of Carac including but not limited to erythema, scaling, itching, weeping, crusting, and pain.
Sski Pregnancy And Lactation Text: This medication is Pregnancy Category D and isn't considered safe during pregnancy. It is excreted in breast milk.
Hydroxyzine Pregnancy And Lactation Text: This medication is not safe during pregnancy and should not be taken. It is also excreted in breast milk and breast feeding isn't recommended.
Skyrizi Counseling: I discussed with the patient the risks of risankizumab-rzaa including but not limited to immunosuppression, and serious infections.  The patient understands that monitoring is required including a PPD at baseline and must alert us or the primary physician if symptoms of infection or other concerning signs are noted.
Erythromycin Counseling:  I discussed with the patient the risks of erythromycin including but not limited to GI upset, allergic reaction, drug rash, diarrhea, increase in liver enzymes, and yeast infections.
Oral Minoxidil Counseling- I discussed with the patient the risks of oral minoxidil including but not limited to shortness of breath, swelling of the feet or ankles, dizziness, lightheadedness, unwanted hair growth and allergic reaction.  The patient verbalized understanding of the proper use and possible adverse effects of oral minoxidil.  All of the patient's questions and concerns were addressed.
Hydroquinone Counseling:  Patient advised that medication may result in skin irritation, lightening (hypopigmentation), dryness, and burning.  In the event of skin irritation, the patient was advised to reduce the amount of the drug applied or use it less frequently.  Rarely, spots that are treated with hydroquinone can become darker (pseudoochronosis).  Should this occur, patient instructed to stop medication and call the office. The patient verbalized understanding of the proper use and possible adverse effects of hydroquinone.  All of the patient's questions and concerns were addressed.
Tetracycline Counseling: Patient counseled regarding possible photosensitivity and increased risk for sunburn.  Patient instructed to avoid sunlight, if possible.  When exposed to sunlight, patients should wear protective clothing, sunglasses, and sunscreen.  The patient was instructed to call the office immediately if the following severe adverse effects occur:  hearing changes, easy bruising/bleeding, severe headache, or vision changes.  The patient verbalized understanding of the proper use and possible adverse effects of tetracycline.  All of the patient's questions and concerns were addressed. Patient understands to avoid pregnancy while on therapy due to potential birth defects.
Prednisone Counseling:  I discussed with the patient the risks of prolonged use of prednisone including but not limited to weight gain, insomnia, osteoporosis, mood changes, diabetes, susceptibility to infection, glaucoma and high blood pressure.  In cases where prednisone use is prolonged, patients should be monitored with blood pressure checks, serum glucose levels and an eye exam.  Additionally, the patient may need to be placed on GI prophylaxis, PCP prophylaxis, and calcium and vitamin D supplementation and/or a bisphosphonate.  The patient verbalized understanding of the proper use and the possible adverse effects of prednisone.  All of the patient's questions and concerns were addressed.
Wartpeel Counseling:  I discussed with the patient the risks of Wartpeel including but not limited to erythema, scaling, itching, weeping, crusting, and pain.
Hydroxychloroquine Pregnancy And Lactation Text: This medication has been shown to cause fetal harm but it isn't assigned a Pregnancy Risk Category. There are small amounts excreted in breast milk.
Carac Pregnancy And Lactation Text: This medication is Pregnancy Category X and contraindicated in pregnancy and in women who may become pregnant. It is unknown if this medication is excreted in breast milk.
High Dose Vitamin A Counseling: Side effects reviewed, pt to contact office should one occur.
Azithromycin Pregnancy And Lactation Text: This medication is considered safe during pregnancy and is also secreted in breast milk.
Ketoconazole Pregnancy And Lactation Text: This medication is Pregnancy Category C and it isn't know if it is safe during pregnancy. It is also excreted in breast milk and breast feeding isn't recommended.
Dapsone Counseling: I discussed with the patient the risks of dapsone including but not limited to hemolytic anemia, agranulocytosis, rashes, methemoglobinemia, kidney failure, peripheral neuropathy, headaches, GI upset, and liver toxicity.  Patients who start dapsone require monitoring including baseline LFTs and weekly CBCs for the first month, then every month thereafter.  The patient verbalized understanding of the proper use and possible adverse effects of dapsone.  All of the patient's questions and concerns were addressed.
Opzelura Counseling:  I discussed with the patient the risks of Opzelura including but not limited to nasopharngitis, bronchitis, ear infection, eosinophila, hives, diarrhea, folliculitis, tonsillitis, and rhinorrhea.  Taken orally, this medication has been linked to serious infections; higher rate of mortality; malignancy and lymphoproliferative disorders; major adverse cardiovascular events; thrombosis; thrombocytopenia, anemia, and neutropenia; and lipid elevations.
Sotyktu Pregnancy And Lactation Text: There is insufficient data to evaluate whether or not Sotyktu is safe to use during pregnancy.   It is not known if Sotyktu passes into breast milk and whether or not it is safe to use when breastfeeding.  
Cosentyx Counseling:  I discussed with the patient the risks of Cosentyx including but not limited to worsening of Crohn's disease, immunosuppression, allergic reactions and infections.  The patient understands that monitoring is required including a PPD at baseline and must alert us or the primary physician if symptoms of infection or other concerning signs are noted.
Dutasteride Male Counseling: Dustasteride Counseling:  I discussed with the patient the risks of use of dutasteride including but not limited to decreased libido, decreased ejaculate volume, and gynecomastia. Women who can become pregnant should not handle medication.  All of the patient's questions and concerns were addressed.
Thalidomide Counseling: I discussed with the patient the risks of thalidomide including but not limited to birth defects, anxiety, weakness, chest pain, dizziness, cough and severe allergy.
Topical Clindamycin Pregnancy And Lactation Text: This medication is Pregnancy Category B and is considered safe during pregnancy. It is unknown if it is excreted in breast milk.
Metronidazole Pregnancy And Lactation Text: This medication is Pregnancy Category B and considered safe during pregnancy.  It is also excreted in breast milk.
Azathioprine Pregnancy And Lactation Text: This medication is Pregnancy Category D and isn't considered safe during pregnancy. It is unknown if this medication is excreted in breast milk.
Xolair Counseling:  Patient informed of potential adverse effects including but not limited to fever, muscle aches, rash and allergic reactions.  The patient verbalized understanding of the proper use and possible adverse effects of Xolair.  All of the patient's questions and concerns were addressed.
Solaraze Counseling:  I discussed with the patient the risks of Solaraze including but not limited to erythema, scaling, itching, weeping, crusting, and pain.
Oral Minoxidil Pregnancy And Lactation Text: This medication should only be used when clearly needed if you are pregnant, attempting to become pregnant or breast feeding.
Infliximab Counseling:  I discussed with the patient the risks of infliximab including but not limited to myelosuppression, immunosuppression, autoimmune hepatitis, demyelinating diseases, lymphoma, and serious infections.  The patient understands that monitoring is required including a PPD at baseline and must alert us or the primary physician if symptoms of infection or other concerning signs are noted.
Erythromycin Pregnancy And Lactation Text: This medication is Pregnancy Category B and is considered safe during pregnancy. It is also excreted in breast milk.
Xeljanz Counseling: I discussed with the patient the risks of Xeljanz therapy including increased risk of infection, liver issues, headache, diarrhea, or cold symptoms. Live vaccines should be avoided. They were instructed to call if they have any problems.
Albendazole Counseling:  I discussed with the patient the risks of albendazole including but not limited to cytopenia, kidney damage, nausea/vomiting and severe allergy.  The patient understands that this medication is being used in an off-label manner.
Dapsone Pregnancy And Lactation Text: This medication is Pregnancy Category C and is not considered safe during pregnancy or breast feeding.
Calcipotriene Counseling:  I discussed with the patient the risks of calcipotriene including but not limited to erythema, scaling, itching, and irritation.
Opioid Counseling: I discussed with the patient the potential side effects of opioids including but not limited to addiction, altered mental status, and depression. I stressed avoiding alcohol, benzodiazepines, muscle relaxants and sleep aids unless specifically okayed by a physician. The patient verbalized understanding of the proper use and possible adverse effects of opioids. All of the patient's questions and concerns were addressed. They were instructed to flush the remaining pills down the toilet if they did not need them for pain.
High Dose Vitamin A Pregnancy And Lactation Text: High dose vitamin A therapy is contraindicated during pregnancy and breast feeding.
Bactrim Counseling:  I discussed with the patient the risks of sulfa antibiotics including but not limited to GI upset, allergic reaction, drug rash, diarrhea, dizziness, photosensitivity, and yeast infections.  Rarely, more serious reactions can occur including but not limited to aplastic anemia, agranulocytosis, methemoglobinemia, blood dyscrasias, liver or kidney failure, lung infiltrates or desquamative/blistering drug rashes.
Terbinafine Counseling: Patient counseling regarding adverse effects of terbinafine including but not limited to headache, diarrhea, rash, upset stomach, liver function test abnormalities, itching, taste/smell disturbance, nausea, abdominal pain, and flatulence.  There is a rare possibility of liver failure that can occur when taking terbinafine.  The patient understands that a baseline LFT and kidney function test may be required. The patient verbalized understanding of the proper use and possible adverse effects of terbinafine.  All of the patient's questions and concerns were addressed.
Opzelura Pregnancy And Lactation Text: There is insufficient data to evaluate drug-associated risk for major birth defects, miscarriage, or other adverse maternal or fetal outcomes.  There is a pregnancy registry that monitors pregnancy outcomes in pregnant persons exposed to the medication during pregnancy.  It is unknown if this medication is excreted in breast milk.  Do not breastfeed during treatment and for about 4 weeks after the last dose.
Low Dose Naltrexone Counseling- I discussed with the patient the potential risks and side effects of low dose naltrexone including but not limited to: more vivid dreams, headaches, nausea, vomiting, abdominal pain, fatigue, dizziness, and anxiety.
Minocycline Counseling: Patient advised regarding possible photosensitivity and discoloration of the teeth, skin, lips, tongue and gums.  Patient instructed to avoid sunlight, if possible.  When exposed to sunlight, patients should wear protective clothing, sunglasses, and sunscreen.  The patient was instructed to call the office immediately if the following severe adverse effects occur:  hearing changes, easy bruising/bleeding, severe headache, or vision changes.  The patient verbalized understanding of the proper use and possible adverse effects of minocycline.  All of the patient's questions and concerns were addressed.
Dutasteride Pregnancy And Lactation Text: This medication is absolutely contraindicated in women, especially during pregnancy and breast feeding. Feminization of male fetuses is possible if taking while pregnant.
Topical Ketoconazole Counseling: Patient counseled that this medication may cause skin irritation or allergic reactions.  In the event of skin irritation, the patient was advised to reduce the amount of the drug applied or use it less frequently.   The patient verbalized understanding of the proper use and possible adverse effects of ketoconazole.  All of the patient's questions and concerns were addressed.
Cellcept Counseling:  I discussed with the patient the risks of mycophenolate mofetil including but not limited to infection/immunosuppression, GI upset, hypokalemia, hypercholesterolemia, bone marrow suppression, lymphoproliferative disorders, malignancy, GI ulceration/bleed/perforation, colitis, interstitial lung disease, kidney failure, progressive multifocal leukoencephalopathy, and birth defects.  The patient understands that monitoring is required including a baseline creatinine and regular CBC testing. In addition, patient must alert us immediately if symptoms of infection or other concerning signs are noted.
Calcipotriene Pregnancy And Lactation Text: The use of this medication during pregnancy or lactation is not recommended as there is insufficient data.
Imiquimod Counseling:  I discussed with the patient the risks of imiquimod including but not limited to erythema, scaling, itching, weeping, crusting, and pain.  Patient understands that the inflammatory response to imiquimod is variable from person to person and was educated regarded proper titration schedule.  If flu-like symptoms develop, patient knows to discontinue the medication and contact us.
Stelara Counseling:  I discussed with the patient the risks of ustekinumab including but not limited to immunosuppression, malignancy, posterior leukoencephalopathy syndrome, and serious infections.  The patient understands that monitoring is required including a PPD at baseline and must alert us or the primary physician if symptoms of infection or other concerning signs are noted.
Solaraze Pregnancy And Lactation Text: This medication is Pregnancy Category B and is considered safe. There is some data to suggest avoiding during the third trimester. It is unknown if this medication is excreted in breast milk.
Xolair Pregnancy And Lactation Text: This medication is Pregnancy Category B and is considered safe during pregnancy. This medication is excreted in breast milk.
Otezla Counseling: The side effects of Otezla were discussed with the patient, including but not limited to worsening or new depression, weight loss, diarrhea, nausea, upper respiratory tract infection, and headache. Patient instructed to call the office should any adverse effect occur.  The patient verbalized understanding of the proper use and possible adverse effects of Otezla.  All the patient's questions and concerns were addressed.
Opioid Pregnancy And Lactation Text: These medications can lead to premature delivery and should be avoided during pregnancy. These medications are also present in breast milk in small amounts.
Winlevi Counseling:  I discussed with the patient the risks of topical clascoterone including but not limited to erythema, scaling, itching, and stinging. Patient voiced their understanding.
Bactrim Pregnancy And Lactation Text: This medication is Pregnancy Category D and is known to cause fetal risk.  It is also excreted in breast milk.
Low Dose Naltrexone Pregnancy And Lactation Text: Naltrexone is pregnancy category C.  There have been no adequate and well-controlled studies in pregnant women.  It should be used in pregnancy only if the potential benefit justifies the potential risk to the fetus.   Limited data indicates that naltrexone is minimally excreted into breastmilk.
Xelgeraldoz Pregnancy And Lactation Text: This medication is Pregnancy Category D and is not considered safe during pregnancy.  The risk during breast feeding is also uncertain.
Gabapentin Counseling: I discussed with the patient the risks of gabapentin including but not limited to dizziness, somnolence, fatigue and ataxia.
Albendazole Pregnancy And Lactation Text: This medication is Pregnancy Category C and it isn't known if it is safe during pregnancy. It is also excreted in breast milk.
Tranexamic Acid Counseling:  Patient advised of the small risk of bleeding problems with tranexamic acid. They were also instructed to call if they developed any nausea, vomiting or diarrhea. All of the patient's questions and concerns were addressed.
Picato Counseling:  I discussed with the patient the risks of Picato including but not limited to erythema, scaling, itching, weeping, crusting, and pain.
Dupixent Counseling: I discussed with the patient the risks of dupilumab including but not limited to eye inflammation and irritation, cold sores, injection site reactions, allergic reactions and increased risk of parasitic infection. The patient understands that monitoring is required and they must alert us or the primary physician if symptoms of infection or other concerning signs are noted.
Cantharidin Counseling:  I discussed with the patient the risks of Cantharidin including but not limited to pain, redness, burning, itching, and blistering.
Finasteride Male Counseling: Finasteride Counseling:  I discussed with the patient the risks of use of finasteride including but not limited to decreased libido, decreased ejaculate volume, gynecomastia, and depression. Women should not handle medication.  All of the patient's questions and concerns were addressed.
Terbinafine Pregnancy And Lactation Text: This medication is Pregnancy Category B and is considered safe during pregnancy. It is also excreted in breast milk and breast feeding isn't recommended.
Fluconazole Counseling:  Patient counseled regarding adverse effects of fluconazole including but not limited to headache, diarrhea, nausea, upset stomach, liver function test abnormalities, taste disturbance, and stomach pain.  There is a rare possibility of liver failure that can occur when taking fluconazole.  The patient understands that monitoring of LFTs and kidney function test may be required, especially at baseline. The patient verbalized understanding of the proper use and possible adverse effects of fluconazole.  All of the patient's questions and concerns were addressed.
Cibinqo Counseling: I discussed with the patient the risks of Cibinqo therapy including but not limited to common cold, nausea, headache, cold sores, increased blood CPK levels, dizziness, UTIs, fatigue, acne, and vomitting. Live vaccines should be avoided.  This medication has been linked to serious infections; higher rate of mortality; malignancy and lymphoproliferative disorders; major adverse cardiovascular events; thrombosis; thrombocytopenia and lymphopenia; lipid elevations; and retinal detachment.
Otezla Pregnancy And Lactation Text: This medication is Pregnancy Category C and it isn't known if it is safe during pregnancy. It is unknown if it is excreted in breast milk.
Aklief counseling:  Patient advised to apply a pea-sized amount only at bedtime and wait 30 minutes after washing their face before applying.  If too drying, patient may add a non-comedogenic moisturizer.  The most commonly reported side effects including irritation, redness, scaling, dryness, stinging, burning, itching, and increased risk of sunburn.  The patient verbalized understanding of the proper use and possible adverse effects of retinoids.  All of the patient's questions and concerns were addressed.
Cantharidin Pregnancy And Lactation Text: This medication has not been proven safe during pregnancy. It is unknown if this medication is excreted in breast milk.
Soolantra Counseling: I discussed with the patients the risks of topial Soolantra. This is a medicine which decreases the number of mites and inflammation in the skin. You experience burning, stinging, eye irritation or allergic reactions.  Please call our office if you develop any problems from using this medication.
Winlevi Pregnancy And Lactation Text: This medication is considered safe during pregnancy and breastfeeding.
Niacinamide Counseling: I recommended taking niacin or niacinamide, also know as vitamin B3, twice daily. Recent evidence suggests that taking vitamin B3 (500 mg twice daily) can reduce the risk of actinic keratoses and non-melanoma skin cancers. Side effects of vitamin B3 include flushing and headache.
Cephalexin Counseling: I counseled the patient regarding use of cephalexin as an antibiotic for prophylactic and/or therapeutic purposes. Cephalexin (commonly prescribed under brand name Keflex) is a cephalosporin antibiotic which is active against numerous classes of bacteria, including most skin bacteria. Side effects may include nausea, diarrhea, gastrointestinal upset, rash, hives, yeast infections, and in rare cases, hepatitis, kidney disease, seizures, fever, confusion, neurologic symptoms, and others. Patients with severe allergies to penicillin medications are cautioned that there is about a 10% incidence of cross-reactivity with cephalosporins. When possible, patients with penicillin allergies should use alternatives to cephalosporins for antibiotic therapy.
Rituxan Counseling:  I discussed with the patient the risks of Rituxan infusions. Side effects can include infusion reactions, severe drug rashes including mucocutaneous reactions, reactivation of latent hepatitis and other infections and rarely progressive multifocal leukoencephalopathy.  All of the patient's questions and concerns were addressed.
Dupixent Pregnancy And Lactation Text: This medication likely crosses the placenta but the risk for the fetus is uncertain. This medication is excreted in breast milk.
Topical Metronidazole Counseling: Metronidazole is a topical antibiotic medication. You may experience burning, stinging, redness, or allergic reactions.  Please call our office if you develop any problems from using this medication.
Tranexamic Acid Pregnancy And Lactation Text: It is unknown if this medication is safe during pregnancy or breast feeding.
Ivermectin Counseling:  Patient instructed to take medication on an empty stomach with a full glass of water.  Patient informed of potential adverse effects including but not limited to nausea, diarrhea, dizziness, itching, and swelling of the extremities or lymph nodes.  The patient verbalized understanding of the proper use and possible adverse effects of ivermectin.  All of the patient's questions and concerns were addressed.
Finasteride Pregnancy And Lactation Text: This medication is absolutely contraindicated during pregnancy. It is unknown if it is excreted in breast milk.
Quinolones Counseling:  I discussed with the patient the risks of fluoroquinolones including but not limited to GI upset, allergic reaction, drug rash, diarrhea, dizziness, photosensitivity, yeast infections, liver function test abnormalities, tendonitis/tendon rupture.
Erivedge Counseling- I discussed with the patient the risks of Erivedge including but not limited to nausea, vomiting, diarrhea, constipation, weight loss, changes in the sense of taste, decreased appetite, muscle spasms, and hair loss.  The patient verbalized understanding of the proper use and possible adverse effects of Erivedge.  All of the patient's questions and concerns were addressed.
Cibinqo Pregnancy And Lactation Text: It is unknown if this medication will adversely affect pregnancy or breast feeding.  You should not take this medication if you are currently pregnant or planning a pregnancy or while breastfeeding.
Arava Counseling:  Patient counseled regarding adverse effects of Arava including but not limited to nausea, vomiting, abnormalities in liver function tests. Patients may develop mouth sores, rash, diarrhea, and abnormalities in blood counts. The patient understands that monitoring is required including LFTs and blood counts.  There is a rare possibility of scarring of the liver and lung problems that can occur when taking methotrexate. Persistent nausea, loss of appetite, pale stools, dark urine, cough, and shortness of breath should be reported immediately. Patient advised to discontinue Arava treatment and consult with a physician prior to attempting conception. The patient will have to undergo a treatment to eliminate Arava from the body prior to conception.
Cyclophosphamide Counseling:  I discussed with the patient the risks of cyclophosphamide including but not limited to hair loss, hormonal abnormalities, decreased fertility, abdominal pain, diarrhea, nausea and vomiting, bone marrow suppression and infection. The patient understands that monitoring is required while taking this medication.
Aklief Pregnancy And Lactation Text: It is unknown if this medication is safe to use during pregnancy.  It is unknown if this medication is excreted in breast milk.  Breastfeeding women should use the topical cream on the smallest area of the skin for the shortest time needed while breastfeeding.  Do not apply to nipple and areola.
5-Fu Counseling: 5-Fluorouracil Counseling:  I discussed with the patient the risks of 5-fluorouracil including but not limited to erythema, scaling, itching, weeping, crusting, and pain.
Acitretin Counseling:  I discussed with the patient the risks of acitretin including but not limited to hair loss, dry lips/skin/eyes, liver damage, hyperlipidemia, depression/suicidal ideation, photosensitivity.  Serious rare side effects can include but are not limited to pancreatitis, pseudotumor cerebri, bony changes, clot formation/stroke/heart attack.  Patient understands that alcohol is contraindicated since it can result in liver toxicity and significantly prolong the elimination of the drug by many years.
Klisyri Counseling:  I discussed with the patient the risks of Klisyri including but not limited to erythema, scaling, itching, weeping, crusting, and pain.
Taltz Counseling: I discussed with the patient the risks of ixekizumab including but not limited to immunosuppression, serious infections, worsening of inflammatory bowel disease and drug reactions.  The patient understands that monitoring is required including a PPD at baseline and must alert us or the primary physician if symptoms of infection or other concerning signs are noted.
Oxybutynin Counseling:  I discussed with the patient the risks of oxybutynin including but not limited to skin rash, drowsiness, dry mouth, difficulty urinating, and blurred vision.
Soolantra Pregnancy And Lactation Text: This medication is Pregnancy Category C. This medication is considered safe during breast feeding.
VTAMA Counseling: I discussed with the patient that VTAMA is not for use in the eyes, mouth or mouth. They should call the office if they develop any signs of allergic reactions to VTAMA. The patient verbalized understanding of the proper use and possible adverse effects of VTAMA.  All of the patient's questions and concerns were addressed.
Cimetidine Counseling:  I discussed with the patient the risks of Cimetidine including but not limited to gynecomastia, headache, diarrhea, nausea, drowsiness, arrhythmias, pancreatitis, skin rashes, psychosis, bone marrow suppression and kidney toxicity.
Detail Level: Simple
Rituxan Pregnancy And Lactation Text: This medication is Pregnancy Category C and it isn't know if it is safe during pregnancy. It is unknown if this medication is excreted in breast milk but similar antibodies are known to be excreted.
Protopic Counseling: Patient may experience a mild burning sensation during topical application. Protopic is not approved in children less than 2 years of age. There have been case reports of hematologic and skin malignancies in patients using topical calcineurin inhibitors although causality is questionable.
Niacinamide Pregnancy And Lactation Text: These medications are considered safe during pregnancy.
Cephalexin Pregnancy And Lactation Text: This medication is Pregnancy Category B and considered safe during pregnancy.  It is also excreted in breast milk but can be used safely for shorter doses.
Cyclophosphamide Pregnancy And Lactation Text: This medication is Pregnancy Category D and it isn't considered safe during pregnancy. This medication is excreted in breast milk.
Enbrel Counseling:  I discussed with the patient the risks of etanercept including but not limited to myelosuppression, immunosuppression, autoimmune hepatitis, demyelinating diseases, lymphoma, and infections.  The patient understands that monitoring is required including a PPD at baseline and must alert us or the primary physician if symptoms of infection or other concerning signs are noted.
Birth Control Pills Counseling: Birth Control Pill Counseling: I discussed with the patient the potential side effects of OCPs including but not limited to increased risk of stroke, heart attack, thrombophlebitis, deep venous thrombosis, hepatic adenomas, breast changes, GI upset, headaches, and depression.  The patient verbalized understanding of the proper use and possible adverse effects of OCPs. All of the patient's questions and concerns were addressed.
Topical Metronidazole Pregnancy And Lactation Text: This medication is Pregnancy Category B and considered safe during pregnancy.  It is also considered safe to use while breastfeeding.
Valtrex Counseling: I discussed with the patient the risks of valacyclovir including but not limited to kidney damage, nausea, vomiting and severe allergy.  The patient understands that if the infection seems to be worsening or is not improving, they are to call.
Acitretin Pregnancy And Lactation Text: This medication is Pregnancy Category X and should not be given to women who are pregnant or may become pregnant in the future. This medication is excreted in breast milk.
Olumiant Counseling: I discussed with the patient the risks of Olumiant therapy including but not limited to upper respiratory tract infections, shingles, cold sores, and nausea. Live vaccines should be avoided.  This medication has been linked to serious infections; higher rate of mortality; malignancy and lymphoproliferative disorders; major adverse cardiovascular events; thrombosis; gastrointestinal perforations; neutropenia; lymphopenia; anemia; liver enzyme elevations; and lipid elevations.
Klisyri Pregnancy And Lactation Text: It is unknown if this medication can harm a developing fetus or if it is excreted in breast milk.
Griseofulvin Counseling:  I discussed with the patient the risks of griseofulvin including but not limited to photosensitivity, cytopenia, liver damage, nausea/vomiting and severe allergy.  The patient understands that this medication is best absorbed when taken with a fatty meal (e.g., ice cream or french fries).
Topical Retinoid counseling:  Patient advised to apply a pea-sized amount only at bedtime and wait 30 minutes after washing their face before applying.  If too drying, patient may add a non-comedogenic moisturizer. The patient verbalized understanding of the proper use and possible adverse effects of retinoids.  All of the patient's questions and concerns were addressed.
Clindamycin Counseling: I counseled the patient regarding use of clindamycin as an antibiotic for prophylactic and/or therapeutic purposes. Clindamycin is active against numerous classes of bacteria, including skin bacteria. Side effects may include nausea, diarrhea, gastrointestinal upset, rash, hives, yeast infections, and in rare cases, colitis.
Azelaic Acid Counseling: Patient counseled that medicine may cause skin irritation and to avoid applying near the eyes.  In the event of skin irritation, the patient was advised to reduce the amount of the drug applied or use it less frequently.   The patient verbalized understanding of the proper use and possible adverse effects of azelaic acid.  All of the patient's questions and concerns were addressed.
Siliq Counseling:  I discussed with the patient the risks of Siliq including but not limited to new or worsening depression, suicidal thoughts and behavior, immunosuppression, malignancy, posterior leukoencephalopathy syndrome, and serious infections.  The patient understands that monitoring is required including a PPD at baseline and must alert us or the primary physician if symptoms of infection or other concerning signs are noted. There is also a special program designed to monitor depression which is required with Siliq.
Nsaids Counseling: NSAID Counseling: I discussed with the patient that NSAIDs should be taken with food. Prolonged use of NSAIDs can result in the development of stomach ulcers.  Patient advised to stop taking NSAIDs if abdominal pain occurs.  The patient verbalized understanding of the proper use and possible adverse effects of NSAIDs.  All of the patient's questions and concerns were addressed.
Valtrex Pregnancy And Lactation Text: this medication is Pregnancy Category B and is considered safe during pregnancy. This medication is not directly found in breast milk but it's metabolite acyclovir is present.
Libtayo Counseling- I discussed with the patient the risks of Libtayo including but not limited to nausea, vomiting, diarrhea, and bone or muscle pain.  The patient verbalized understanding of the proper use and possible adverse effects of Libtayo.  All of the patient's questions and concerns were addressed.
Protopic Pregnancy And Lactation Text: This medication is Pregnancy Category C. It is unknown if this medication is excreted in breast milk when applied topically.
Rifampin Counseling: I discussed with the patient the risks of rifampin including but not limited to liver damage, kidney damage, red-orange body fluids, nausea/vomiting and severe allergy.
Drysol Counseling:  I discussed with the patient the risks of drysol/aluminum chloride including but not limited to skin rash, itching, irritation, burning.
Cyclosporine Counseling:  I discussed with the patient the risks of cyclosporine including but not limited to hypertension, gingival hyperplasia,myelosuppression, immunosuppression, liver damage, kidney damage, neurotoxicity, lymphoma, and serious infections. The patient understands that monitoring is required including baseline blood pressure, CBC, CMP, lipid panel and uric acid, and then 1-2 times monthly CMP and blood pressure.
Topical Steroids Counseling: I discussed with the patient that prolonged use of topical steroids can result in the increased appearance of superficial blood vessels (telangiectasias), lightening (hypopigmentation) and thinning of the skin (atrophy).  Patient understands to avoid using high potency steroids in skin folds, the groin or the face.  The patient verbalized understanding of the proper use and possible adverse effects of topical steroids.  All of the patient's questions and concerns were addressed.
Birth Control Pills Pregnancy And Lactation Text: This medication should be avoided if pregnant and for the first 30 days post-partum.
Griseofulvin Pregnancy And Lactation Text: This medication is Pregnancy Category X and is known to cause serious birth defects. It is unknown if this medication is excreted in breast milk but breast feeding should be avoided.
Minoxidil Counseling: Minoxidil is a topical medication which can increase blood flow where it is applied. It is uncertain how this medication increases hair growth. Side effects are uncommon and include stinging and allergic reactions.
Olumiant Pregnancy And Lactation Text: Based on animal studies, Olumiant may cause embryo-fetal harm when administered to pregnant women.  The medication should not be used in pregnancy.  Breastfeeding is not recommended during treatment.
Clofazimine Counseling:  I discussed with the patient the risks of clofazimine including but not limited to skin and eye pigmentation, liver damage, nausea/vomiting, gastrointestinal bleeding and allergy.
Propranolol Counseling:  I discussed with the patient the risks of propranolol including but not limited to low heart rate, low blood pressure, low blood sugar, restlessness and increased cold sensitivity. They should call the office if they experience any of these side effects.
Tremfya Counseling: I discussed with the patient the risks of guselkumab including but not limited to immunosuppression, serious infections, and drug reactions.  The patient understands that monitoring is required including a PPD at baseline and must alert us or the primary physician if symptoms of infection or other concerning signs are noted.
Adbry Counseling: I discussed with the patient the risks of tralokinumab including but not limited to eye infection and irritation, cold sores, injection site reactions, worsening of asthma, allergic reactions and increased risk of parasitic infection.  Live vaccines should be avoided while taking tralokinumab. The patient understands that monitoring is required and they must alert us or the primary physician if symptoms of infection or other concerning signs are noted.

## 2023-10-31 ENCOUNTER — TELEPHONE (OUTPATIENT)
Dept: MEDICAL GROUP | Facility: MEDICAL CENTER | Age: 58
End: 2023-10-31
Payer: COMMERCIAL

## 2023-10-31 NOTE — TELEPHONE ENCOUNTER
DOCUMENTATION OF PAR STATUS:    1. Name of Medication & Dose: Depo-Testosterone 200MG/ML solution     2. Name of Prescription Coverage Company & phone #: [email or web address]    3. Date Prior Auth Submitted: 10/31/2023    4. What information was given to obtain insurance decision? Clinical office notes    5. Prior Auth Status? Drug is covered by current benefit plan. No further PA activity needed    6. Patient Notified: N\A

## 2023-12-21 ENCOUNTER — RX ONLY (OUTPATIENT)
Age: 58
Setting detail: RX ONLY
End: 2023-12-21

## 2023-12-21 RX ORDER — DOXYCYCLINE 100 MG/1
CAPSULE ORAL
Qty: 60 | Refills: 1 | Status: ERX

## 2024-01-22 ENCOUNTER — HOSPITAL ENCOUNTER (OUTPATIENT)
Dept: LAB | Facility: MEDICAL CENTER | Age: 59
End: 2024-01-22
Attending: NURSE PRACTITIONER
Payer: COMMERCIAL

## 2024-01-22 DIAGNOSIS — R79.89 LOW TESTOSTERONE IN MALE: ICD-10-CM

## 2024-01-22 DIAGNOSIS — E29.1 HYPOGONADISM IN MALE: ICD-10-CM

## 2024-01-22 PROCEDURE — 84402 ASSAY OF FREE TESTOSTERONE: CPT

## 2024-01-22 PROCEDURE — 83001 ASSAY OF GONADOTROPIN (FSH): CPT

## 2024-01-22 PROCEDURE — 82670 ASSAY OF TOTAL ESTRADIOL: CPT

## 2024-01-22 PROCEDURE — 84270 ASSAY OF SEX HORMONE GLOBUL: CPT

## 2024-01-22 PROCEDURE — 36415 COLL VENOUS BLD VENIPUNCTURE: CPT

## 2024-01-22 PROCEDURE — 84403 ASSAY OF TOTAL TESTOSTERONE: CPT

## 2024-01-22 PROCEDURE — 80307 DRUG TEST PRSMV CHEM ANLYZR: CPT

## 2024-01-22 PROCEDURE — 83002 ASSAY OF GONADOTROPIN (LH): CPT

## 2024-01-23 LAB
ESTRADIOL SERPL-MCNC: 46.1 PG/ML
FSH SERPL-ACNC: 0.3 MIU/ML (ref 1.5–12.4)
LH SERPL-ACNC: <0.3 IU/L (ref 1.7–8.6)

## 2024-01-24 LAB
AMPHET CTO UR CFM-MCNC: NEGATIVE NG/ML
BARBITURATES CTO UR CFM-MCNC: NEGATIVE NG/ML
BENZODIAZ CTO UR CFM-MCNC: NEGATIVE NG/ML
CANNABINOIDS CTO UR CFM-MCNC: NEGATIVE NG/ML
COCAINE CTO UR CFM-MCNC: NEGATIVE NG/ML
CREAT UR-MCNC: 142 MG/DL (ref 20–400)
DRUG COMMENT 753798: NORMAL
METHADONE CTO UR CFM-MCNC: NEGATIVE NG/ML
OPIATES CTO UR CFM-MCNC: NEGATIVE NG/ML
PCP CTO UR CFM-MCNC: NEGATIVE NG/ML
PROPOXYPH CTO UR CFM-MCNC: NEGATIVE NG/ML
SHBG SERPL-SCNC: 23 NMOL/L (ref 19–76)
TESTOST FREE MFR SERPL: 2.4 % (ref 1.6–2.9)
TESTOST FREE SERPL-MCNC: 177 PG/ML (ref 47–244)
TESTOST SERPL-MCNC: 745 NG/DL (ref 300–890)

## 2024-02-07 ENCOUNTER — RX ONLY (OUTPATIENT)
Age: 59
Setting detail: RX ONLY
End: 2024-02-07

## 2024-02-07 RX ORDER — DOXYCYCLINE 100 MG/1
CAPSULE ORAL
Qty: 60 | Refills: 0 | Status: ERX

## 2024-02-20 ENCOUNTER — TELEPHONE (OUTPATIENT)
Dept: MEDICAL GROUP | Facility: MEDICAL CENTER | Age: 59
End: 2024-02-20

## 2024-02-20 ENCOUNTER — TELEMEDICINE (OUTPATIENT)
Dept: MEDICAL GROUP | Facility: MEDICAL CENTER | Age: 59
End: 2024-02-20
Payer: COMMERCIAL

## 2024-02-20 VITALS — BODY MASS INDEX: 29.39 KG/M2 | HEIGHT: 72 IN | WEIGHT: 217 LBS

## 2024-02-20 DIAGNOSIS — E55.9 VITAMIN D DEFICIENCY: ICD-10-CM

## 2024-02-20 DIAGNOSIS — R79.89 ELEVATED LFTS: ICD-10-CM

## 2024-02-20 DIAGNOSIS — E29.1 HYPOGONADISM IN MALE: ICD-10-CM

## 2024-02-20 PROCEDURE — 99214 OFFICE O/P EST MOD 30 MIN: CPT | Mod: 95 | Performed by: NURSE PRACTITIONER

## 2024-02-20 RX ORDER — TESTOSTERONE CYPIONATE 200 MG/ML
100 INJECTION, SOLUTION INTRAMUSCULAR
Qty: 12 ML | Refills: 0 | Status: SHIPPED | OUTPATIENT
Start: 2024-02-20 | End: 2024-02-21 | Stop reason: SDUPTHER

## 2024-02-20 RX ORDER — ERGOCALCIFEROL 1.25 MG/1
50000 CAPSULE ORAL
Qty: 12 CAPSULE | Refills: 3 | Status: SHIPPED | OUTPATIENT
Start: 2024-02-20

## 2024-02-20 ASSESSMENT — FIBROSIS 4 INDEX: FIB4 SCORE: 0.63

## 2024-02-20 ASSESSMENT — PATIENT HEALTH QUESTIONNAIRE - PHQ9: CLINICAL INTERPRETATION OF PHQ2 SCORE: 0

## 2024-02-20 NOTE — ASSESSMENT & PLAN NOTE
Latest Reference Range & Units 07/10/23 12:13 01/22/24 09:43   Testosterone % Free 1.6 - 2.9 % 2.3 2.4   Testosterone,Total 300 - 890 ng/dL 276 (L) 745   (L): Data is abnormally low  Chronic condition. Feeling better in terms of energy, sleeping better.   Taking Testosterone 100 mg twice per wk.   Needing refills again.    Latest Reference Range & Units 06/12/23 07:10   Prostatic Specific Antigen Tot 0.00 - 4.00 ng/mL 0.92   Chronic condition.  Controlled substance agreement signed 10/13/2023, urine drug screen was obtained 1/22/2024 and clear.  PDMP verified.

## 2024-02-20 NOTE — ASSESSMENT & PLAN NOTE
Latest Reference Range & Units 06/12/23 07:10   25-Hydroxy   Vitamin D 25 30 - 100 ng/mL 21 (L)   (L): Data is abnormally low

## 2024-02-20 NOTE — PROGRESS NOTES
Virtual Visit: Established Patient   This visit was conducted via Zoom using secure and encrypted videoconferencing technology.   The patient was in their home in the [facility].    The patient's identity was confirmed and verbal consent was obtained for this virtual visit.   This evaluation was conducted via Zoom using secure and encrypted videoconferencing technology. The patient was in their home in the [facility].    The patient's identity was confirmed and verbal consent was obtained for this virtual visit.     Subjective:   CC:   Chief Complaint   Patient presents with    Follow-Up     Testosterone levels concern        Zachery Esquivel is a 58 y.o. male presenting for evaluation and management of:    Hypogonadism in male   Latest Reference Range & Units 07/10/23 12:13 01/22/24 09:43   Testosterone % Free 1.6 - 2.9 % 2.3 2.4   Testosterone,Total 300 - 890 ng/dL 276 (L) 745   (L): Data is abnormally low  Chronic condition. Feeling better in terms of energy, sleeping better.   Taking Testosterone 100 mg twice per wk.   Needing refills again.    Latest Reference Range & Units 06/12/23 07:10   Prostatic Specific Antigen Tot 0.00 - 4.00 ng/mL 0.92   Chronic condition.  Controlled substance agreement signed 10/13/2023, urine drug screen was obtained 1/22/2024 and clear.  PDMP verified.    Vitamin D deficiency   Latest Reference Range & Units 06/12/23 07:10   25-Hydroxy   Vitamin D 25 30 - 100 ng/mL 21 (L)   (L): Data is abnormally low    Elevated LFTs   Latest Reference Range & Units 06/07/22 08:39 06/12/23 07:10   AST(SGOT) 12 - 45 U/L 18 10 (L)   ALT(SGPT) 2 - 50 U/L 28 20   (L): Data is abnormally low  This has resolved.  He denies excessive alcohol and NSAIDs use.     ROS     Current medicines (including changes today)  Current Outpatient Medications   Medication Sig Dispense Refill    vitamin D2, Ergocalciferol, (DRISDOL) 1.25 MG (12517 UT) Cap capsule Take 1 Capsule by mouth every 7 days. 12  "Capsule 3    testosterone cypionate (DEPO-TESTOSTERONE) 200 MG/ML injection Inject 0.5 mL into the shoulder, thigh, or buttocks two times a week for 90 days. 12 mL 0    NEEDLE, DISP, 25 G 25G X 5/8\" Misc Please dispense 3-month supply, 3 needles x90 days for vitamin B12 injections.  Thank you 3 Each 3    Continuous Blood Gluc  (FREESTYLE KAILYN 14 DAY READER) Device 1 APPLICATION. EVERY 14 DAYS. 3 Each 3    Continuous Blood Gluc Sensor (FREESTYLE KAILYN 14 DAY SENSOR) Misc 1 Application. every 14 days. 4 Each 3    Continuous Blood Gluc  (FREESTYLE KAILYN 2 READER) Device 3 mos supply please 1 Each 1    Continuous Blood Gluc Sensor (FREESTYLE KAILYN 2 SENSOR) Misc 3 mos supply please 1 Each 1    fluticasone (FLONASE) 50 MCG/ACT nasal spray Administer 1 Spray into affected nostril(S) every day.       No current facility-administered medications for this visit.       Patient Active Problem List    Diagnosis Date Noted    Vitamin D deficiency 02/20/2024    Skin lesion of face 10/12/2023    Hypogonadism in male 07/12/2023    Low libido 06/13/2023    Elevated hemoglobin A1c 06/13/2023    Seasonal allergies 04/12/2023    Displacement of lumbar intervertebral disc without myelopathy 04/12/2023    Trigger point of shoulder region 04/12/2023    Abdominal cramping 04/12/2023    Low HDL (under 40) 06/07/2022    Chronic bilateral low back pain without sciatica 06/07/2022    IFG (impaired fasting glucose) 10/13/2020    Elevated LFTs 09/29/2020    Obesity (BMI 30-39.9) 01/09/2018    Polyuria 06/28/2015        Objective:   Ht 1.829 m (6')   Wt 98.4 kg (217 lb)   BMI 29.43 kg/m²     Physical Exam:  Constitutional: Alert, no distress, well-groomed.  Skin: No rashes in visible areas.  Eye: Round. Conjunctiva clear, lids normal. No icterus.   ENMT: Lips pink without lesions, good dentition, moist mucous membranes. Phonation normal.  Neck: No masses, no thyromegaly. Moves freely without pain.  Respiratory: Unlabored " respiratory effort, no cough or audible wheeze  Psych: Alert and oriented x3, normal affect and mood.     Assessment and Plan:   The following treatment plan was discussed:   1. Hypogonadism in male  Well controlled on current regimen.  Continue.  Refills given   - testosterone cypionate (DEPO-TESTOSTERONE) 200 MG/ML injection; Inject 0.5 mL into the shoulder, thigh, or buttocks two times a week for 90 days.  Dispense: 12 mL; Refill: 0    2. Vitamin D deficiency  - vitamin D2, Ergocalciferol, (DRISDOL) 1.25 MG (47236 UT) Cap capsule; Take 1 Capsule by mouth every 7 days.  Dispense: 12 Capsule; Refill: 3    3. Elevated LFTs  Resolved        Discussed with patient possible alternative diagnoses, patient is to take all medications as prescribed.      If symptoms persist FU w/PCP, if symptoms worsen go to emergency room.      If experiencing any side effects from prescribed medications report to the office immediately or go to emergency room.     Reviewed indication, dosage, usage and potential adverse effects of prescribed medications.      Reviewed risks and benefits of treatment plan. Patient verbalizes understanding of all instruction and verbally agrees to plan.     Discussed plan with the patient, and patient agrees to the above.      I personally reviewed prior external notes and test results pertinent to today's visit.     Follow-up: 3 mos

## 2024-02-20 NOTE — TELEPHONE ENCOUNTER
Vm left to [facility] 3mo f/u visit with Maricarmen GARCIA     Call back left     Tom Graham, Med Ass't

## 2024-02-20 NOTE — ASSESSMENT & PLAN NOTE
Latest Reference Range & Units 06/07/22 08:39 06/12/23 07:10   AST(SGOT) 12 - 45 U/L 18 10 (L)   ALT(SGPT) 2 - 50 U/L 28 20   (L): Data is abnormally low  This has resolved.  He denies excessive alcohol and NSAIDs use.

## 2024-02-21 DIAGNOSIS — E29.1 HYPOGONADISM IN MALE: ICD-10-CM

## 2024-02-21 RX ORDER — TESTOSTERONE CYPIONATE 200 MG/ML
100 INJECTION, SOLUTION INTRAMUSCULAR
Qty: 12 ML | Refills: 0 | Status: SHIPPED | OUTPATIENT
Start: 2024-02-21 | End: 2024-05-21

## 2024-02-22 NOTE — TELEPHONE ENCOUNTER
Received request via: Pharmacy    Was the patient seen in the last year in this department? Yes    Does the patient have an active prescription (recently filled or refills available) for medication(s) requested? No    [facility] Name[facility]    Does the patient have alf Plus and need 100 day supply (blood pressure, diabetes and cholesterol meds only)? Patient does not have SCP     Pharmacy comment: Please clarify day supply.

## 2024-03-04 ENCOUNTER — APPOINTMENT (RX ONLY)
Dept: URBAN - METROPOLITAN AREA CLINIC 15 | Facility: CLINIC | Age: 59
Setting detail: DERMATOLOGY
End: 2024-03-04

## 2024-03-04 DIAGNOSIS — L71.1 RHINOPHYMA: ICD-10-CM | Status: INADEQUATELY CONTROLLED

## 2024-03-04 DIAGNOSIS — L73.0 ACNE KELOID: ICD-10-CM | Status: INADEQUATELY CONTROLLED

## 2024-03-04 PROCEDURE — 99214 OFFICE O/P EST MOD 30 MIN: CPT

## 2024-03-04 PROCEDURE — ? ADDITIONAL NOTES

## 2024-03-04 PROCEDURE — ? COUNSELING

## 2024-03-04 PROCEDURE — ? MEDICATION COUNSELING

## 2024-03-04 PROCEDURE — ? PRESCRIPTION MEDICATION MANAGEMENT

## 2024-03-04 PROCEDURE — ? PRESCRIPTION

## 2024-03-04 RX ORDER — TRETINOIN 0.5 MG/G
LOTION TOPICAL
Qty: 60 | Refills: 3 | Status: ERX | COMMUNITY
Start: 2024-03-04

## 2024-03-04 RX ORDER — CLINDAMYCIN PHOSPHATE 10 MG/G
1 GEL TOPICAL QD
Qty: 60 | Refills: 2 | Status: ERX | COMMUNITY
Start: 2024-03-04

## 2024-03-04 RX ADMIN — TRETINOIN: 0.5 LOTION TOPICAL at 00:00

## 2024-03-04 RX ADMIN — CLINDAMYCIN PHOSPHATE 1: 10 GEL TOPICAL at 00:00

## 2024-03-04 ASSESSMENT — LOCATION SIMPLE DESCRIPTION DERM
LOCATION SIMPLE: POSTERIOR SCALP
LOCATION SIMPLE: NOSE
LOCATION SIMPLE: POSTERIOR NECK

## 2024-03-04 ASSESSMENT — LOCATION ZONE DERM
LOCATION ZONE: NOSE
LOCATION ZONE: SCALP
LOCATION ZONE: NECK

## 2024-03-04 ASSESSMENT — LOCATION DETAILED DESCRIPTION DERM
LOCATION DETAILED: RIGHT POSTERIOR NECK
LOCATION DETAILED: NASAL SUPRATIP
LOCATION DETAILED: MID-OCCIPITAL SCALP

## 2024-03-04 NOTE — PROCEDURE: ADDITIONAL NOTES
Render Risk Assessment In Note?: no
Detail Level: Simple
Additional Notes: Pt does not feel that doxy has helped with nose which is not surprising.\\nDiscussed surgical treatment with Dr. Reyna; he'd liek to see her for consultation.\\nDiscussed treatment with isotretinoin, but pt does not want to come into office monthly
Additional Notes: Pt has been on doxycycline 1-2x daily on and off since October\\nDuring 2-3 week period when he couldn’t get rx, flared badly, overall ebbs and flows wtih dosage\\nComplains of pain and dyscomfort\\nEncouraged pt to avoid friction and cutting hair less short\\nPt said that he would be compliant using a topical 1-2x daily\\nPt reports that first outbreak started when wearing a hard hat during project in [facility]\\nPt reports being on supplemental testosterone which he claims he needs as his body does not naturally make testosterone

## 2024-03-04 NOTE — PROCEDURE: MEDICATION COUNSELING
Glycopyrrolate Counseling:  I discussed with the patient the risks of glycopyrrolate including but not limited to skin rash, drowsiness, dry mouth, difficulty urinating, and blurred vision.
Klisyri Pregnancy And Lactation Text: It is unknown if this medication can harm a developing fetus or if it is excreted in breast milk.
Ketoconazole Counseling:   Patient counseled regarding improving absorption with orange juice.  Adverse effects include but are not limited to breast enlargement, headache, diarrhea, nausea, upset stomach, liver function test abnormalities, taste disturbance, and stomach pain.  There is a rare possibility of liver failure that can occur when taking ketoconazole. The patient understands that monitoring of LFTs may be required, especially at baseline. The patient verbalized understanding of the proper use and possible adverse effects of ketoconazole.  All of the patient's questions and concerns were addressed.
Xelgeraldoz Pregnancy And Lactation Text: This medication is Pregnancy Category D and is not considered safe during pregnancy.  The risk during breast feeding is also uncertain.
Protopic Counseling: Patient may experience a mild burning sensation during topical application. Protopic is not approved in children less than 2 years of age. There have been case reports of hematologic and skin malignancies in patients using topical calcineurin inhibitors although causality is questionable.
Carac Pregnancy And Lactation Text: This medication is Pregnancy Category X and contraindicated in pregnancy and in women who may become pregnant. It is unknown if this medication is excreted in breast milk.
Minocycline Pregnancy And Lactation Text: This medication is Pregnancy Category D and not consider safe during pregnancy. It is also excreted in breast milk.
Siliq Pregnancy And Lactation Text: The risk during pregnancy and breastfeeding is uncertain with this medication.
Cyclosporine Counseling:  I discussed with the patient the risks of cyclosporine including but not limited to hypertension, gingival hyperplasia,myelosuppression, immunosuppression, liver damage, kidney damage, neurotoxicity, lymphoma, and serious infections. The patient understands that monitoring is required including baseline blood pressure, CBC, CMP, lipid panel and uric acid, and then 1-2 times monthly CMP and blood pressure.
Clindamycin Pregnancy And Lactation Text: This medication can be used in pregnancy if certain situations. Clindamycin is also present in breast milk.
Topical Metronidazole Counseling: Metronidazole is a topical antibiotic medication. You may experience burning, stinging, redness, or allergic reactions.  Please call our office if you develop any problems from using this medication.
Soolantra Pregnancy And Lactation Text: This medication is Pregnancy Category C. This medication is considered safe during breast feeding.
Elidel Counseling: Patient may experience a mild burning sensation during topical application. Elidel is not approved in children less than 2 years of age. There have been case reports of hematologic and skin malignancies in patients using topical calcineurin inhibitors although causality is questionable.
Winlevi Pregnancy And Lactation Text: This medication is considered safe during pregnancy and breastfeeding.
Hydroxyzine Pregnancy And Lactation Text: This medication is not safe during pregnancy and should not be taken. It is also excreted in breast milk and breast feeding isn't recommended.
Spironolactone Pregnancy And Lactation Text: This medication can cause feminization of the male fetus and should be avoided during pregnancy. The active metabolite is also found in breast milk.
Propranolol Pregnancy And Lactation Text: This medication is Pregnancy Category C and it isn't known if it is safe during pregnancy. It is excreted in breast milk.
Bimzelx Pregnancy And Lactation Text: This medication crosses the placenta and the safety is uncertain during pregnancy. It is unknown if this medication is present in breast milk.
Dutasteride Male Counseling: Dustasteride Counseling:  I discussed with the patient the risks of use of dutasteride including but not limited to decreased libido, decreased ejaculate volume, and gynecomastia. Women who can become pregnant should not handle medication.  All of the patient's questions and concerns were addressed.
Humira Counseling:  I discussed with the patient the risks of adalimumab including but not limited to myelosuppression, immunosuppression, autoimmune hepatitis, demyelinating diseases, lymphoma, and serious infections.  The patient understands that monitoring is required including a PPD at baseline and must alert us or the primary physician if symptoms of infection or other concerning signs are noted.
Olanzapine Counseling- I discussed with the patient the common side effects of olanzapine including but are not limited to: lack of energy, dry mouth, increased appetite, sleepiness, tremor, constipation, dizziness, changes in behavior, or restlessness.  Explained that teenagers are more likely to experience headaches, abdominal pain, pain in the arms or legs, tiredness, and sleepiness.  Serious side effects include but are not limited: increased risk of death in elderly patients who are confused, have memory loss, or dementia-related psychosis; hyperglycemia; increased cholesterol and triglycerides; and weight gain.
Tetracycline Counseling: Patient counseled regarding possible photosensitivity and increased risk for sunburn.  Patient instructed to avoid sunlight, if possible.  When exposed to sunlight, patients should wear protective clothing, sunglasses, and sunscreen.  The patient was instructed to call the office immediately if the following severe adverse effects occur:  hearing changes, easy bruising/bleeding, severe headache, or vision changes.  The patient verbalized understanding of the proper use and possible adverse effects of tetracycline.  All of the patient's questions and concerns were addressed. Patient understands to avoid pregnancy while on therapy due to potential birth defects.
Use Enhanced Medication Counseling?: No
Calcipotriene Counseling:  I discussed with the patient the risks of calcipotriene including but not limited to erythema, scaling, itching, and irritation.
Quinolones Counseling:  I discussed with the patient the risks of fluoroquinolones including but not limited to GI upset, allergic reaction, drug rash, diarrhea, dizziness, photosensitivity, yeast infections, liver function test abnormalities, tendonitis/tendon rupture.
Litfulo Pregnancy And Lactation Text: Based on animal studies, Lifulo may cause embryo-fetal harm when administered to pregnant women.  The medication should not be used in pregnancy.  Breastfeeding is not recommended during treatment.
Tremfya Counseling: I discussed with the patient the risks of guselkumab including but not limited to immunosuppression, serious infections, and drug reactions.  The patient understands that monitoring is required including a PPD at baseline and must alert us or the primary physician if symptoms of infection or other concerning signs are noted.
Glycopyrrolate Pregnancy And Lactation Text: This medication is Pregnancy Category B and is considered safe during pregnancy. It is unknown if it is excreted breast milk.
VTAMA Counseling: I discussed with the patient that VTAMA is not for use in the eyes, mouth or mouth. They should call the office if they develop any signs of allergic reactions to VTAMA. The patient verbalized understanding of the proper use and possible adverse effects of VTAMA.  All of the patient's questions and concerns were addressed.
Topical Metronidazole Pregnancy And Lactation Text: This medication is Pregnancy Category B and considered safe during pregnancy.  It is also considered safe to use while breastfeeding.
Elidel Pregnancy And Lactation Text: This medication is Pregnancy Category C. It is unknown if this medication is excreted in breast milk.
Minoxidil Counseling: Minoxidil is a topical medication which can increase blood flow where it is applied. It is uncertain how this medication increases hair growth. Side effects are uncommon and include stinging and allergic reactions.
Arava Counseling:  Patient counseled regarding adverse effects of Arava including but not limited to nausea, vomiting, abnormalities in liver function tests. Patients may develop mouth sores, rash, diarrhea, and abnormalities in blood counts. The patient understands that monitoring is required including LFTs and blood counts.  There is a rare possibility of scarring of the liver and lung problems that can occur when taking methotrexate. Persistent nausea, loss of appetite, pale stools, dark urine, cough, and shortness of breath should be reported immediately. Patient advised to discontinue Arava treatment and consult with a physician prior to attempting conception. The patient will have to undergo a treatment to eliminate Arava from the body prior to conception.
Ketoconazole Pregnancy And Lactation Text: This medication is Pregnancy Category C and it isn't know if it is safe during pregnancy. It is also excreted in breast milk and breast feeding isn't recommended.
Protopic Pregnancy And Lactation Text: This medication is Pregnancy Category C. It is unknown if this medication is excreted in breast milk when applied topically.
Cyclosporine Pregnancy And Lactation Text: This medication is Pregnancy Category C and it isn't know if it is safe during pregnancy. This medication is excreted in breast milk.
Topical Retinoid counseling:  Patient advised to apply a pea-sized amount only at bedtime and wait 30 minutes after washing their face before applying.  If too drying, patient may add a non-comedogenic moisturizer. The patient verbalized understanding of the proper use and possible adverse effects of retinoids.  All of the patient's questions and concerns were addressed.
Simponi Counseling:  I discussed with the patient the risks of golimumab including but not limited to myelosuppression, immunosuppression, autoimmune hepatitis, demyelinating diseases, lymphoma, and serious infections.  The patient understands that monitoring is required including a PPD at baseline and must alert us or the primary physician if symptoms of infection or other concerning signs are noted.
Acitretin Counseling:  I discussed with the patient the risks of acitretin including but not limited to hair loss, dry lips/skin/eyes, liver damage, hyperlipidemia, depression/suicidal ideation, photosensitivity.  Serious rare side effects can include but are not limited to pancreatitis, pseudotumor cerebri, bony changes, clot formation/stroke/heart attack.  Patient understands that alcohol is contraindicated since it can result in liver toxicity and significantly prolong the elimination of the drug by many years.
Calcipotriene Pregnancy And Lactation Text: The use of this medication during pregnancy or lactation is not recommended as there is insufficient data.
Doxycycline Counseling:  Patient counseled regarding possible photosensitivity and increased risk for sunburn.  Patient instructed to avoid sunlight, if possible.  When exposed to sunlight, patients should wear protective clothing, sunglasses, and sunscreen.  The patient was instructed to call the office immediately if the following severe adverse effects occur:  hearing changes, easy bruising/bleeding, severe headache, or vision changes.  The patient verbalized understanding of the proper use and possible adverse effects of doxycycline.  All of the patient's questions and concerns were addressed.
Olumiant Counseling: I discussed with the patient the risks of Olumiant therapy including but not limited to upper respiratory tract infections, shingles, cold sores, and nausea. Live vaccines should be avoided.  This medication has been linked to serious infections; higher rate of mortality; malignancy and lymphoproliferative disorders; major adverse cardiovascular events; thrombosis; gastrointestinal perforations; neutropenia; lymphopenia; anemia; liver enzyme elevations; and lipid elevations.
SSKI Counseling:  I discussed with the patient the risks of SSKI including but not limited to thyroid abnormalities, metallic taste, GI upset, fever, headache, acne, arthralgias, paraesthesias, lymphadenopathy, easy bleeding, arrhythmias, and allergic reaction.
Olanzapine Pregnancy And Lactation Text: This medication is pregnancy category C.   There are no adequate and well controlled trials with olanzapine in pregnant females.  Olanzapine should be used during pregnancy only if the potential benefit justifies the potential risk to the fetus.   In a study in lactating healthy women, olanzapine was excreted in breast milk.  It is recommended that women taking olanzapine should not breast feed.
Dutasteride Female Counseling: Dutasteride Counseling:  I discussed with the patient the risks of use of dutasteride including but not limited to decreased libido and sexual dysfunction. Explained the teratogenic nature of the medication and stressed the importance of not getting pregnant during treatment. All of the patient's questions and concerns were addressed.
Cantharidin Counseling:  I discussed with the patient the risks of Cantharidin including but not limited to pain, redness, burning, itching, and blistering.
Hydroxychloroquine Counseling:  I discussed with the patient that a baseline ophthalmologic exam is needed at the start of therapy and every year thereafter while on therapy. A CBC may also be warranted for monitoring.  The side effects of this medication were discussed with the patient, including but not limited to agranulocytosis, aplastic anemia, seizures, rashes, retinopathy, and liver toxicity. Patient instructed to call the office should any adverse effect occur.  The patient verbalized understanding of the proper use and possible adverse effects of Plaquenil.  All the patient's questions and concerns were addressed.
Humira Pregnancy And Lactation Text: This medication is Pregnancy Category B and is considered safe during pregnancy. It is unknown if this medication is excreted in breast milk.
Cimzia Counseling:  I discussed with the patient the risks of Cimzia including but not limited to immunosuppression, allergic reactions and infections.  The patient understands that monitoring is required including a PPD at baseline and must alert us or the primary physician if symptoms of infection or other concerning signs are noted.
Albendazole Counseling:  I discussed with the patient the risks of albendazole including but not limited to cytopenia, kidney damage, nausea/vomiting and severe allergy.  The patient understands that this medication is being used in an off-label manner.
Aklief counseling:  Patient advised to apply a pea-sized amount only at bedtime and wait 30 minutes after washing their face before applying.  If too drying, patient may add a non-comedogenic moisturizer.  The most commonly reported side effects including irritation, redness, scaling, dryness, stinging, burning, itching, and increased risk of sunburn.  The patient verbalized understanding of the proper use and possible adverse effects of retinoids.  All of the patient's questions and concerns were addressed.
Vtama Pregnancy And Lactation Text: It is unknown if this medication can cause problems during pregnancy and breastfeeding.
Quinolones Pregnancy And Lactation Text: This medication is Pregnancy Category C and it isn't know if it is safe during pregnancy. It is also excreted in breast milk.
Eucrisa Counseling: Patient may experience a mild burning sensation during topical application. Eucrisa is not approved in children less than 3 months of age.
Topical Steroids Counseling: I discussed with the patient that prolonged use of topical steroids can result in the increased appearance of superficial blood vessels (telangiectasias), lightening (hypopigmentation) and thinning of the skin (atrophy).  Patient understands to avoid using high potency steroids in skin folds, the groin or the face.  The patient verbalized understanding of the proper use and possible adverse effects of topical steroids.  All of the patient's questions and concerns were addressed.
Terbinafine Counseling: Patient counseling regarding adverse effects of terbinafine including but not limited to headache, diarrhea, rash, upset stomach, liver function test abnormalities, itching, taste/smell disturbance, nausea, abdominal pain, and flatulence.  There is a rare possibility of liver failure that can occur when taking terbinafine.  The patient understands that a baseline LFT and kidney function test may be required. The patient verbalized understanding of the proper use and possible adverse effects of terbinafine.  All of the patient's questions and concerns were addressed.
Azithromycin Counseling:  I discussed with the patient the risks of azithromycin including but not limited to GI upset, allergic reaction, drug rash, diarrhea, and yeast infections.
Cantharidin Pregnancy And Lactation Text: This medication has not been proven safe during pregnancy. It is unknown if this medication is excreted in breast milk.
Azathioprine Counseling:  I discussed with the patient the risks of azathioprine including but not limited to myelosuppression, immunosuppression, hepatotoxicity, lymphoma, and infections.  The patient understands that monitoring is required including baseline LFTs, Creatinine, possible TPMP genotyping and weekly CBCs for the first month and then every 2 weeks thereafter.  The patient verbalized understanding of the proper use and possible adverse effects of azathioprine.  All of the patient's questions and concerns were addressed.
Arava Pregnancy And Lactation Text: This medication is Pregnancy Category X and is absolutely contraindicated during pregnancy. It is unknown if it is excreted in breast milk.
Methotrexate Counseling:  Patient counseled regarding adverse effects of methotrexate including but not limited to nausea, vomiting, abnormalities in liver function tests. Patients may develop mouth sores, rash, diarrhea, and abnormalities in blood counts. The patient understands that monitoring is required including LFT's and blood counts.  There is a rare possibility of scarring of the liver and lung problems that can occur when taking methotrexate. Persistent nausea, loss of appetite, pale stools, dark urine, cough, and shortness of breath should be reported immediately. Patient advised to discontinue methotrexate treatment at least three months before attempting to become pregnant.  I discussed the need for folate supplements while taking methotrexate.  These supplements can decrease side effects during methotrexate treatment. The patient verbalized understanding of the proper use and possible adverse effects of methotrexate.  All of the patient's questions and concerns were addressed.
Minoxidil Pregnancy And Lactation Text: This medication has not been assigned a Pregnancy Risk Category but animal studies failed to show danger with the topical medication. It is unknown if the medication is excreted in breast milk.
Qbrexza Counseling:  I discussed with the patient the risks of Qbrexza including but not limited to headache, mydriasis, blurred vision, dry eyes, nasal dryness, dry mouth, dry throat, dry skin, urinary hesitation, and constipation.  Local skin reactions including erythema, burning, stinging, and itching can also occur.
Ilumya Counseling: I discussed with the patient the risks of tildrakizumab including but not limited to immunosuppression, malignancy, posterior leukoencephalopathy syndrome, and serious infections.  The patient understands that monitoring is required including a PPD at baseline and must alert us or the primary physician if symptoms of infection or other concerning signs are noted.
Xolair Counseling:  Patient informed of potential adverse effects including but not limited to fever, muscle aches, rash and allergic reactions.  The patient verbalized understanding of the proper use and possible adverse effects of Xolair.  All of the patient's questions and concerns were addressed.
Acitretin Pregnancy And Lactation Text: This medication is Pregnancy Category X and should not be given to women who are pregnant or may become pregnant in the future. This medication is excreted in breast milk.
Sski Pregnancy And Lactation Text: This medication is Pregnancy Category D and isn't considered safe during pregnancy. It is excreted in breast milk.
Cimzia Pregnancy And Lactation Text: This medication crosses the placenta but can be considered safe in certain situations. Cimzia may be excreted in breast milk.
Dutasteride Pregnancy And Lactation Text: This medication is absolutely contraindicated in women, especially during pregnancy and breast feeding. Feminization of male fetuses is possible if taking while pregnant.
Oral Minoxidil Counseling- I discussed with the patient the risks of oral minoxidil including but not limited to shortness of breath, swelling of the feet or ankles, dizziness, lightheadedness, unwanted hair growth and allergic reaction.  The patient verbalized understanding of the proper use and possible adverse effects of oral minoxidil.  All of the patient's questions and concerns were addressed.
Aklief Pregnancy And Lactation Text: It is unknown if this medication is safe to use during pregnancy.  It is unknown if this medication is excreted in breast milk.  Breastfeeding women should use the topical cream on the smallest area of the skin for the shortest time needed while breastfeeding.  Do not apply to nipple and areola.
5-Fu Counseling: 5-Fluorouracil Counseling:  I discussed with the patient the risks of 5-fluorouracil including but not limited to erythema, scaling, itching, weeping, crusting, and pain.
Olumiant Pregnancy And Lactation Text: Based on animal studies, Olumiant may cause embryo-fetal harm when administered to pregnant women.  The medication should not be used in pregnancy.  Breastfeeding is not recommended during treatment.
Qbrexza Pregnancy And Lactation Text: There is no available data on Qbrexza use in pregnant women.  There is no available data on Qbrexza use in lactation.
Albendazole Pregnancy And Lactation Text: This medication is Pregnancy Category C and it isn't known if it is safe during pregnancy. It is also excreted in breast milk.
Terbinafine Pregnancy And Lactation Text: This medication is Pregnancy Category B and is considered safe during pregnancy. It is also excreted in breast milk and breast feeding isn't recommended.
Azathioprine Pregnancy And Lactation Text: This medication is Pregnancy Category D and isn't considered safe during pregnancy. It is unknown if this medication is excreted in breast milk.
Topical Steroids Applications Pregnancy And Lactation Text: Most topical steroids are considered safe to use during pregnancy and lactation.  Any topical steroid applied to the breast or nipple should be washed off before breastfeeding.
Tazorac Counseling:  Patient advised that medication is irritating and drying.  Patient may need to apply sparingly and wash off after an hour before eventually leaving it on overnight.  The patient verbalized understanding of the proper use and possible adverse effects of tazorac.  All of the patient's questions and concerns were addressed.
Hydroxychloroquine Pregnancy And Lactation Text: This medication has been shown to cause fetal harm but it isn't assigned a Pregnancy Risk Category. There are small amounts excreted in breast milk.
Doxycycline Pregnancy And Lactation Text: This medication is Pregnancy Category D and not consider safe during pregnancy. It is also excreted in breast milk but is considered safe for shorter treatment courses.
Fluconazole Counseling:  Patient counseled regarding adverse effects of fluconazole including but not limited to headache, diarrhea, nausea, upset stomach, liver function test abnormalities, taste disturbance, and stomach pain.  There is a rare possibility of liver failure that can occur when taking fluconazole.  The patient understands that monitoring of LFTs and kidney function test may be required, especially at baseline. The patient verbalized understanding of the proper use and possible adverse effects of fluconazole.  All of the patient's questions and concerns were addressed.
Mirvaso Counseling: Mirvaso is a topical medication which can decrease superficial blood flow where applied. Side effects are uncommon and include stinging, redness and allergic reactions.
Bexarotene Counseling:  I discussed with the patient the risks of bexarotene including but not limited to hair loss, dry lips/skin/eyes, liver abnormalities, hyperlipidemia, pancreatitis, depression/suicidal ideation, photosensitivity, drug rash/allergic reactions, hypothyroidism, anemia, leukopenia, infection, cataracts, and teratogenicity.  Patient understands that they will need regular blood tests to check lipid profile, liver function tests, white blood cell count, thyroid function tests and pregnancy test if applicable.
Methotrexate Pregnancy And Lactation Text: This medication is Pregnancy Category X and is known to cause fetal harm. This medication is excreted in breast milk.
Clofazimine Counseling:  I discussed with the patient the risks of clofazimine including but not limited to skin and eye pigmentation, liver damage, nausea/vomiting, gastrointestinal bleeding and allergy.
Skyrizi Counseling: I discussed with the patient the risks of risankizumab-rzaa including but not limited to immunosuppression, and serious infections.  The patient understands that monitoring is required including a PPD at baseline and must alert us or the primary physician if symptoms of infection or other concerning signs are noted.
Zoryve Counseling:  I discussed with the patient that Zoryve is not for use in the eyes, mouth or vagina. The most commonly reported side effects include diarrhea, headache, insomnia, application site pain, upper respiratory tract infections, and urinary tract infections.  All of the patient's questions and concerns were addressed.
Finasteride Male Counseling: Finasteride Counseling:  I discussed with the patient the risks of use of finasteride including but not limited to decreased libido, decreased ejaculate volume, gynecomastia, and depression. Women should not handle medication.  All of the patient's questions and concerns were addressed.
Azithromycin Pregnancy And Lactation Text: This medication is considered safe during pregnancy and is also secreted in breast milk.
Thalidomide Counseling: I discussed with the patient the risks of thalidomide including but not limited to birth defects, anxiety, weakness, chest pain, dizziness, cough and severe allergy.
Rinvoq Counseling: I discussed with the patient the risks of Rinvoq therapy including but not limited to upper respiratory tract infections, shingles, cold sores, bronchitis, nausea, cough, fever, acne, and headache. Live vaccines should be avoided.  This medication has been linked to serious infections; higher rate of mortality; malignancy and lymphoproliferative disorders; major adverse cardiovascular events; thrombosis; thrombocytopenia, anemia, and neutropenia; lipid elevations; liver enzyme elevations; and gastrointestinal perforations.
Cosentyx Counseling:  I discussed with the patient the risks of Cosentyx including but not limited to worsening of Crohn's disease, immunosuppression, allergic reactions and infections.  The patient understands that monitoring is required including a PPD at baseline and must alert us or the primary physician if symptoms of infection or other concerning signs are noted.
Oral Minoxidil Pregnancy And Lactation Text: This medication should only be used when clearly needed if you are pregnant, attempting to become pregnant or breast feeding.
Low Dose Naltrexone Counseling- I discussed with the patient the potential risks and side effects of low dose naltrexone including but not limited to: more vivid dreams, headaches, nausea, vomiting, abdominal pain, fatigue, dizziness, and anxiety.
Rhofade Counseling: Rhofade is a topical medication which can decrease superficial blood flow where applied. Side effects are uncommon and include stinging, redness and allergic reactions.
Ivermectin Counseling:  Patient instructed to take medication on an empty stomach with a full glass of water.  Patient informed of potential adverse effects including but not limited to nausea, diarrhea, dizziness, itching, and swelling of the extremities or lymph nodes.  The patient verbalized understanding of the proper use and possible adverse effects of ivermectin.  All of the patient's questions and concerns were addressed.
Xolair Pregnancy And Lactation Text: This medication is Pregnancy Category B and is considered safe during pregnancy. This medication is excreted in breast milk.
Azelaic Acid Counseling: Patient counseled that medicine may cause skin irritation and to avoid applying near the eyes.  In the event of skin irritation, the patient was advised to reduce the amount of the drug applied or use it less frequently.   The patient verbalized understanding of the proper use and possible adverse effects of azelaic acid.  All of the patient's questions and concerns were addressed.
Erythromycin Counseling:  I discussed with the patient the risks of erythromycin including but not limited to GI upset, allergic reaction, drug rash, diarrhea, increase in liver enzymes, and yeast infections.
Cellcept Counseling:  I discussed with the patient the risks of mycophenolate mofetil including but not limited to infection/immunosuppression, GI upset, hypokalemia, hypercholesterolemia, bone marrow suppression, lymphoproliferative disorders, malignancy, GI ulceration/bleed/perforation, colitis, interstitial lung disease, kidney failure, progressive multifocal leukoencephalopathy, and birth defects.  The patient understands that monitoring is required including a baseline creatinine and regular CBC testing. In addition, patient must alert us immediately if symptoms of infection or other concerning signs are noted.
Prednisone Counseling:  I discussed with the patient the risks of prolonged use of prednisone including but not limited to weight gain, insomnia, osteoporosis, mood changes, diabetes, susceptibility to infection, glaucoma and high blood pressure.  In cases where prednisone use is prolonged, patients should be monitored with blood pressure checks, serum glucose levels and an eye exam.  Additionally, the patient may need to be placed on GI prophylaxis, PCP prophylaxis, and calcium and vitamin D supplementation and/or a bisphosphonate.  The patient verbalized understanding of the proper use and the possible adverse effects of prednisone.  All of the patient's questions and concerns were addressed.
Mirvaso Pregnancy And Lactation Text: This medication has not been assigned a Pregnancy Risk Category. It is unknown if the medication is excreted in breast milk.
Clofazimine Pregnancy And Lactation Text: This medication is Pregnancy Category C and isn't considered safe during pregnancy. It is excreted in breast milk.
Topical Sulfur Applications Counseling: Topical Sulfur Counseling: Patient counseled that this medication may cause skin irritation or allergic reactions.  In the event of skin irritation, the patient was advised to reduce the amount of the drug applied or use it less frequently.   The patient verbalized understanding of the proper use and possible adverse effects of topical sulfur application.  All of the patient's questions and concerns were addressed.
Bactrim Counseling:  I discussed with the patient the risks of sulfa antibiotics including but not limited to GI upset, allergic reaction, drug rash, diarrhea, dizziness, photosensitivity, and yeast infections.  Rarely, more serious reactions can occur including but not limited to aplastic anemia, agranulocytosis, methemoglobinemia, blood dyscrasias, liver or kidney failure, lung infiltrates or desquamative/blistering drug rashes.
Tazorac Pregnancy And Lactation Text: This medication is not safe during pregnancy. It is unknown if this medication is excreted in breast milk.
Hydroquinone Counseling:  Patient advised that medication may result in skin irritation, lightening (hypopigmentation), dryness, and burning.  In the event of skin irritation, the patient was advised to reduce the amount of the drug applied or use it less frequently.  Rarely, spots that are treated with hydroquinone can become darker (pseudoochronosis).  Should this occur, patient instructed to stop medication and call the office. The patient verbalized understanding of the proper use and possible adverse effects of hydroquinone.  All of the patient's questions and concerns were addressed.
Bexarotene Pregnancy And Lactation Text: This medication is Pregnancy Category X and should not be given to women who are pregnant or may become pregnant. This medication should not be used if you are breast feeding.
Infliximab Counseling:  I discussed with the patient the risks of infliximab including but not limited to myelosuppression, immunosuppression, autoimmune hepatitis, demyelinating diseases, lymphoma, and serious infections.  The patient understands that monitoring is required including a PPD at baseline and must alert us or the primary physician if symptoms of infection or other concerning signs are noted.
Finasteride Female Counseling: Finasteride Counseling:  I discussed with the patient the risks of use of finasteride including but not limited to decreased libido and sexual dysfunction. Explained the teratogenic nature of the medication and stressed the importance of not getting pregnant during treatment. All of the patient's questions and concerns were addressed.
Cimetidine Counseling:  I discussed with the patient the risks of Cimetidine including but not limited to gynecomastia, headache, diarrhea, nausea, drowsiness, arrhythmias, pancreatitis, skin rashes, psychosis, bone marrow suppression and kidney toxicity.
Drysol Counseling:  I discussed with the patient the risks of drysol/aluminum chloride including but not limited to skin rash, itching, irritation, burning.
Erythromycin Pregnancy And Lactation Text: This medication is Pregnancy Category B and is considered safe during pregnancy. It is also excreted in breast milk.
Griseofulvin Counseling:  I discussed with the patient the risks of griseofulvin including but not limited to photosensitivity, cytopenia, liver damage, nausea/vomiting and severe allergy.  The patient understands that this medication is best absorbed when taken with a fatty meal (e.g., ice cream or french fries).
Otezla Counseling: The side effects of Otezla were discussed with the patient, including but not limited to worsening or new depression, weight loss, diarrhea, nausea, upper respiratory tract infection, and headache. Patient instructed to call the office should any adverse effect occur.  The patient verbalized understanding of the proper use and possible adverse effects of Otezla.  All the patient's questions and concerns were addressed.
Rinvoq Pregnancy And Lactation Text: Based on animal studies, Rinvoq may cause embryo-fetal harm when administered to pregnant women.  The medication should not be used in pregnancy.  Breastfeeding is not recommended during treatment and for 6 days after the last dose.
Low Dose Naltrexone Pregnancy And Lactation Text: Naltrexone is pregnancy category C.  There have been no adequate and well-controlled studies in pregnant women.  It should be used in pregnancy only if the potential benefit justifies the potential risk to the fetus.   Limited data indicates that naltrexone is minimally excreted into breastmilk.
Zyclara Counseling:  I discussed with the patient the risks of imiquimod including but not limited to erythema, scaling, itching, weeping, crusting, and pain.  Patient understands that the inflammatory response to imiquimod is variable from person to person and was educated regarded proper titration schedule.  If flu-like symptoms develop, patient knows to discontinue the medication and contact us.
Topical Sulfur Applications Pregnancy And Lactation Text: This medication is considered safe during pregnancy and breast feeding secondary to limited systemic absorption.
Libtayo Counseling- I discussed with the patient the risks of Libtayo including but not limited to nausea, vomiting, diarrhea, and bone or muscle pain.  The patient verbalized understanding of the proper use and possible adverse effects of Libtayo.  All of the patient's questions and concerns were addressed.
Opzelura Counseling:  I discussed with the patient the risks of Opzelura including but not limited to nasopharngitis, bronchitis, ear infection, eosinophila, hives, diarrhea, folliculitis, tonsillitis, and rhinorrhea.  Taken orally, this medication has been linked to serious infections; higher rate of mortality; malignancy and lymphoproliferative disorders; major adverse cardiovascular events; thrombosis; thrombocytopenia, anemia, and neutropenia; and lipid elevations.
Colchicine Counseling:  Patient counseled regarding adverse effects including but not limited to stomach upset (nausea, vomiting, stomach pain, or diarrhea).  Patient instructed to limit alcohol consumption while taking this medication.  Colchicine may reduce blood counts especially with prolonged use.  The patient understands that monitoring of kidney function and blood counts may be required, especially at baseline. The patient verbalized understanding of the proper use and possible adverse effects of colchicine.  All of the patient's questions and concerns were addressed.
Azelaic Acid Pregnancy And Lactation Text: This medication is considered safe during pregnancy and breast feeding.
Topical Clindamycin Counseling: Patient counseled that this medication may cause skin irritation or allergic reactions.  In the event of skin irritation, the patient was advised to reduce the amount of the drug applied or use it less frequently.   The patient verbalized understanding of the proper use and possible adverse effects of clindamycin.  All of the patient's questions and concerns were addressed.
Bactrim Pregnancy And Lactation Text: This medication is Pregnancy Category D and is known to cause fetal risk.  It is also excreted in breast milk.
Isotretinoin Counseling: Patient should get monthly blood tests, not donate blood, not drive at night if vision affected, not share medication, and not undergo elective surgery for 6 months after tx completed. Side effects reviewed, pt to contact office should one occur.
Erivedge Counseling- I discussed with the patient the risks of Erivedge including but not limited to nausea, vomiting, diarrhea, constipation, weight loss, changes in the sense of taste, decreased appetite, muscle spasms, and hair loss.  The patient verbalized understanding of the proper use and possible adverse effects of Erivedge.  All of the patient's questions and concerns were addressed.
Rifampin Counseling: I discussed with the patient the risks of rifampin including but not limited to liver damage, kidney damage, red-orange body fluids, nausea/vomiting and severe allergy.
Tranexamic Acid Counseling:  Patient advised of the small risk of bleeding problems with tranexamic acid. They were also instructed to call if they developed any nausea, vomiting or diarrhea. All of the patient's questions and concerns were addressed.
Otezla Pregnancy And Lactation Text: This medication is Pregnancy Category C and it isn't known if it is safe during pregnancy. It is unknown if it is excreted in breast milk.
Finasteride Pregnancy And Lactation Text: This medication is absolutely contraindicated during pregnancy. It is unknown if it is excreted in breast milk.
Niacinamide Counseling: I recommended taking niacin or niacinamide, also know as vitamin B3, twice daily. Recent evidence suggests that taking vitamin B3 (500 mg twice daily) can reduce the risk of actinic keratoses and non-melanoma skin cancers. Side effects of vitamin B3 include flushing and headache.
Dupixent Counseling: I discussed with the patient the risks of dupilumab including but not limited to eye inflammation and irritation, cold sores, injection site reactions, allergic reactions and increased risk of parasitic infection. The patient understands that monitoring is required and they must alert us or the primary physician if symptoms of infection or other concerning signs are noted.
Benzoyl Peroxide Counseling: Patient counseled that medicine may cause skin irritation and bleach clothing.  In the event of skin irritation, the patient was advised to reduce the amount of the drug applied or use it less frequently.   The patient verbalized understanding of the proper use and possible adverse effects of benzoyl peroxide.  All of the patient's questions and concerns were addressed.
Sotyktu Counseling:  I discussed the most common side effects of Sotyktu including: common cold, sore throat, sinus infections, cold sores, canker sores, folliculitis, and acne.  I also discussed more serious side effects of Sotyktu including but not limited to: serious allergic reactions; increased risk for infections such as TB; cancers such as lymphomas; rhabdomyolysis and elevated CPK; and elevated triglycerides and liver enzymes. 
Metronidazole Counseling:  I discussed with the patient the risks of metronidazole including but not limited to seizures, nausea/vomiting, a metallic taste in the mouth, nausea/vomiting and severe allergy.
Libtayo Pregnancy And Lactation Text: This medication is contraindicated in pregnancy and when breast feeding.
Stelara Counseling:  I discussed with the patient the risks of ustekinumab including but not limited to immunosuppression, malignancy, posterior leukoencephalopathy syndrome, and serious infections.  The patient understands that monitoring is required including a PPD at baseline and must alert us or the primary physician if symptoms of infection or other concerning signs are noted.
Wartpeel Counseling:  I discussed with the patient the risks of Wartpeel including but not limited to erythema, scaling, itching, weeping, crusting, and pain.
Cyclophosphamide Counseling:  I discussed with the patient the risks of cyclophosphamide including but not limited to hair loss, hormonal abnormalities, decreased fertility, abdominal pain, diarrhea, nausea and vomiting, bone marrow suppression and infection. The patient understands that monitoring is required while taking this medication.
Griseofulvin Pregnancy And Lactation Text: This medication is Pregnancy Category X and is known to cause serious birth defects. It is unknown if this medication is excreted in breast milk but breast feeding should be avoided.
Imiquimod Counseling:  I discussed with the patient the risks of imiquimod including but not limited to erythema, scaling, itching, weeping, crusting, and pain.  Patient understands that the inflammatory response to imiquimod is variable from person to person and was educated regarded proper titration schedule.  If flu-like symptoms develop, patient knows to discontinue the medication and contact us.
Opzelura Pregnancy And Lactation Text: There is insufficient data to evaluate drug-associated risk for major birth defects, miscarriage, or other adverse maternal or fetal outcomes.  There is a pregnancy registry that monitors pregnancy outcomes in pregnant persons exposed to the medication during pregnancy.  It is unknown if this medication is excreted in breast milk.  Do not breastfeed during treatment and for about 4 weeks after the last dose.
Topical Clindamycin Pregnancy And Lactation Text: This medication is Pregnancy Category B and is considered safe during pregnancy. It is unknown if it is excreted in breast milk.
Solaraze Counseling:  I discussed with the patient the risks of Solaraze including but not limited to erythema, scaling, itching, weeping, crusting, and pain.
Rifampin Pregnancy And Lactation Text: This medication is Pregnancy Category C and it isn't know if it is safe during pregnancy. It is also excreted in breast milk and should not be used if you are breast feeding.
Rituxan Counseling:  I discussed with the patient the risks of Rituxan infusions. Side effects can include infusion reactions, severe drug rashes including mucocutaneous reactions, reactivation of latent hepatitis and other infections and rarely progressive multifocal leukoencephalopathy.  All of the patient's questions and concerns were addressed.
Isotretinoin Pregnancy And Lactation Text: This medication is Pregnancy Category X and is considered extremely dangerous during pregnancy. It is unknown if it is excreted in breast milk.
Doxepin Counseling:  Patient advised that the medication is sedating and not to drive a car after taking this medication. Patient informed of potential adverse effects including but not limited to dry mouth, urinary retention, and blurry vision.  The patient verbalized understanding of the proper use and possible adverse effects of doxepin.  All of the patient's questions and concerns were addressed.
Tranexamic Acid Pregnancy And Lactation Text: It is unknown if this medication is safe during pregnancy or breast feeding.
Cephalexin Counseling: I counseled the patient regarding use of cephalexin as an antibiotic for prophylactic and/or therapeutic purposes. Cephalexin (commonly prescribed under brand name Keflex) is a cephalosporin antibiotic which is active against numerous classes of bacteria, including most skin bacteria. Side effects may include nausea, diarrhea, gastrointestinal upset, rash, hives, yeast infections, and in rare cases, hepatitis, kidney disease, seizures, fever, confusion, neurologic symptoms, and others. Patients with severe allergies to penicillin medications are cautioned that there is about a 10% incidence of cross-reactivity with cephalosporins. When possible, patients with penicillin allergies should use alternatives to cephalosporins for antibiotic therapy.
Dupixent Pregnancy And Lactation Text: This medication likely crosses the placenta but the risk for the fetus is uncertain. This medication is excreted in breast milk.
Birth Control Pills Counseling: Birth Control Pill Counseling: I discussed with the patient the potential side effects of OCPs including but not limited to increased risk of stroke, heart attack, thrombophlebitis, deep venous thrombosis, hepatic adenomas, breast changes, GI upset, headaches, and depression.  The patient verbalized understanding of the proper use and possible adverse effects of OCPs. All of the patient's questions and concerns were addressed.
Cibinqo Counseling: I discussed with the patient the risks of Cibinqo therapy including but not limited to common cold, nausea, headache, cold sores, increased blood CPK levels, dizziness, UTIs, fatigue, acne, and vomitting. Live vaccines should be avoided.  This medication has been linked to serious infections; higher rate of mortality; malignancy and lymphoproliferative disorders; major adverse cardiovascular events; thrombosis; thrombocytopenia and lymphopenia; lipid elevations; and retinal detachment.
Oxybutynin Counseling:  I discussed with the patient the risks of oxybutynin including but not limited to skin rash, drowsiness, dry mouth, difficulty urinating, and blurred vision.
Niacinamide Pregnancy And Lactation Text: These medications are considered safe during pregnancy.
Benzoyl Peroxide Pregnancy And Lactation Text: This medication is Pregnancy Category C. It is unknown if benzoyl peroxide is excreted in breast milk.
Adbry Counseling: I discussed with the patient the risks of tralokinumab including but not limited to eye infection and irritation, cold sores, injection site reactions, worsening of asthma, allergic reactions and increased risk of parasitic infection.  Live vaccines should be avoided while taking tralokinumab. The patient understands that monitoring is required and they must alert us or the primary physician if symptoms of infection or other concerning signs are noted.
Gabapentin Counseling: I discussed with the patient the risks of gabapentin including but not limited to dizziness, somnolence, fatigue and ataxia.
Sotyktu Pregnancy And Lactation Text: There is insufficient data to evaluate whether or not Sotyktu is safe to use during pregnancy.   It is not known if Sotyktu passes into breast milk and whether or not it is safe to use when breastfeeding.  
Solaraze Pregnancy And Lactation Text: This medication is Pregnancy Category B and is considered safe. There is some data to suggest avoiding during the third trimester. It is unknown if this medication is excreted in breast milk.
Topical Ketoconazole Counseling: Patient counseled that this medication may cause skin irritation or allergic reactions.  In the event of skin irritation, the patient was advised to reduce the amount of the drug applied or use it less frequently.   The patient verbalized understanding of the proper use and possible adverse effects of ketoconazole.  All of the patient's questions and concerns were addressed.
Odomzo Counseling- I discussed with the patient the risks of Odomzo including but not limited to nausea, vomiting, diarrhea, constipation, weight loss, changes in the sense of taste, decreased appetite, muscle spasms, and hair loss.  The patient verbalized understanding of the proper use and possible adverse effects of Odomzo.  All of the patient's questions and concerns were addressed.
Metronidazole Pregnancy And Lactation Text: This medication is Pregnancy Category B and considered safe during pregnancy.  It is also excreted in breast milk.
Itraconazole Counseling:  I discussed with the patient the risks of itraconazole including but not limited to liver damage, nausea/vomiting, neuropathy, and severe allergy.  The patient understands that this medication is best absorbed when taken with acidic beverages such as non-diet cola or ginger ale.  The patient understands that monitoring is required including baseline LFTs and repeat LFTs at intervals.  The patient understands that they are to contact us or the primary physician if concerning signs are noted.
Detail Level: Simple
Cephalexin Pregnancy And Lactation Text: This medication is Pregnancy Category B and considered safe during pregnancy.  It is also excreted in breast milk but can be used safely for shorter doses.
Rituxan Pregnancy And Lactation Text: This medication is Pregnancy Category C and it isn't know if it is safe during pregnancy. It is unknown if this medication is excreted in breast milk but similar antibodies are known to be excreted.
Sarecycline Counseling: Patient advised regarding possible photosensitivity and discoloration of the teeth, skin, lips, tongue and gums.  Patient instructed to avoid sunlight, if possible.  When exposed to sunlight, patients should wear protective clothing, sunglasses, and sunscreen.  The patient was instructed to call the office immediately if the following severe adverse effects occur:  hearing changes, easy bruising/bleeding, severe headache, or vision changes.  The patient verbalized understanding of the proper use and possible adverse effects of sarecycline.  All of the patient's questions and concerns were addressed.
Picato Counseling:  I discussed with the patient the risks of Picato including but not limited to erythema, scaling, itching, weeping, crusting, and pain.
Dapsone Counseling: I discussed with the patient the risks of dapsone including but not limited to hemolytic anemia, agranulocytosis, rashes, methemoglobinemia, kidney failure, peripheral neuropathy, headaches, GI upset, and liver toxicity.  Patients who start dapsone require monitoring including baseline LFTs and weekly CBCs for the first month, then every month thereafter.  The patient verbalized understanding of the proper use and possible adverse effects of dapsone.  All of the patient's questions and concerns were addressed.
Enbrel Counseling:  I discussed with the patient the risks of etanercept including but not limited to myelosuppression, immunosuppression, autoimmune hepatitis, demyelinating diseases, lymphoma, and infections.  The patient understands that monitoring is required including a PPD at baseline and must alert us or the primary physician if symptoms of infection or other concerning signs are noted.
Taltz Counseling: I discussed with the patient the risks of ixekizumab including but not limited to immunosuppression, serious infections, worsening of inflammatory bowel disease and drug reactions.  The patient understands that monitoring is required including a PPD at baseline and must alert us or the primary physician if symptoms of infection or other concerning signs are noted.
Valtrex Counseling: I discussed with the patient the risks of valacyclovir including but not limited to kidney damage, nausea, vomiting and severe allergy.  The patient understands that if the infection seems to be worsening or is not improving, they are to call.
Opioid Counseling: I discussed with the patient the potential side effects of opioids including but not limited to addiction, altered mental status, and depression. I stressed avoiding alcohol, benzodiazepines, muscle relaxants and sleep aids unless specifically okayed by a physician. The patient verbalized understanding of the proper use and possible adverse effects of opioids. All of the patient's questions and concerns were addressed. They were instructed to flush the remaining pills down the toilet if they did not need them for pain.
Doxepin Pregnancy And Lactation Text: This medication is Pregnancy Category C and it isn't known if it is safe during pregnancy. It is also excreted in breast milk and breast feeding isn't recommended.
Cibinqo Pregnancy And Lactation Text: It is unknown if this medication will adversely affect pregnancy or breast feeding.  You should not take this medication if you are currently pregnant or planning a pregnancy or while breastfeeding.
High Dose Vitamin A Counseling: Side effects reviewed, pt to contact office should one occur.
Birth Control Pills Pregnancy And Lactation Text: This medication should be avoided if pregnant and for the first 30 days post-partum.
Adbry Pregnancy And Lactation Text: It is unknown if this medication will adversely affect pregnancy or breast feeding.
Nsaids Counseling: NSAID Counseling: I discussed with the patient that NSAIDs should be taken with food. Prolonged use of NSAIDs can result in the development of stomach ulcers.  Patient advised to stop taking NSAIDs if abdominal pain occurs.  The patient verbalized understanding of the proper use and possible adverse effects of NSAIDs.  All of the patient's questions and concerns were addressed.
Carac Counseling:  I discussed with the patient the risks of Carac including but not limited to erythema, scaling, itching, weeping, crusting, and pain.
Xeljanz Counseling: I discussed with the patient the risks of Xeljanz therapy including increased risk of infection, liver issues, headache, diarrhea, or cold symptoms. Live vaccines should be avoided. They were instructed to call if they have any problems.
Soolantra Counseling: I discussed with the patients the risks of topial Soolantra. This is a medicine which decreases the number of mites and inflammation in the skin. You experience burning, stinging, eye irritation or allergic reactions.  Please call our office if you develop any problems from using this medication.
Dapsone Pregnancy And Lactation Text: This medication is Pregnancy Category C and is not considered safe during pregnancy or breast feeding.
Minocycline Counseling: Patient advised regarding possible photosensitivity and discoloration of the teeth, skin, lips, tongue and gums.  Patient instructed to avoid sunlight, if possible.  When exposed to sunlight, patients should wear protective clothing, sunglasses, and sunscreen.  The patient was instructed to call the office immediately if the following severe adverse effects occur:  hearing changes, easy bruising/bleeding, severe headache, or vision changes.  The patient verbalized understanding of the proper use and possible adverse effects of minocycline.  All of the patient's questions and concerns were addressed.
Winlevi Counseling:  I discussed with the patient the risks of topical clascoterone including but not limited to erythema, scaling, itching, and stinging. Patient voiced their understanding.
Klisyri Counseling:  I discussed with the patient the risks of Klisyri including but not limited to erythema, scaling, itching, weeping, crusting, and pain.
High Dose Vitamin A Pregnancy And Lactation Text: High dose vitamin A therapy is contraindicated during pregnancy and breast feeding.
Bimzelx Counseling:  I discussed with the patient the risks of Bimzelx including but not limited to depression, immunosuppression, allergic reactions and infections.  The patient understands that monitoring is required including a PPD at baseline and must alert us or the primary physician if symptoms of infection or other concerning signs are noted.
Clindamycin Counseling: I counseled the patient regarding use of clindamycin as an antibiotic for prophylactic and/or therapeutic purposes. Clindamycin is active against numerous classes of bacteria, including skin bacteria. Side effects may include nausea, diarrhea, gastrointestinal upset, rash, hives, yeast infections, and in rare cases, colitis.
Cyclophosphamide Pregnancy And Lactation Text: This medication is Pregnancy Category D and it isn't considered safe during pregnancy. This medication is excreted in breast milk.
Siliq Counseling:  I discussed with the patient the risks of Siliq including but not limited to new or worsening depression, suicidal thoughts and behavior, immunosuppression, malignancy, posterior leukoencephalopathy syndrome, and serious infections.  The patient understands that monitoring is required including a PPD at baseline and must alert us or the primary physician if symptoms of infection or other concerning signs are noted. There is also a special program designed to monitor depression which is required with Siliq.
Spironolactone Counseling: Patient advised regarding risks of diarrhea, abdominal pain, hyperkalemia, birth defects (for female patients), liver toxicity and renal toxicity. The patient may need blood work to monitor liver and kidney function and potassium levels while on therapy. The patient verbalized understanding of the proper use and possible adverse effects of spironolactone.  All of the patient's questions and concerns were addressed.
Valtrex Pregnancy And Lactation Text: this medication is Pregnancy Category B and is considered safe during pregnancy. This medication is not directly found in breast milk but it's metabolite acyclovir is present.
Opioid Pregnancy And Lactation Text: These medications can lead to premature delivery and should be avoided during pregnancy. These medications are also present in breast milk in small amounts.
Propranolol Counseling:  I discussed with the patient the risks of propranolol including but not limited to low heart rate, low blood pressure, low blood sugar, restlessness and increased cold sensitivity. They should call the office if they experience any of these side effects.
Litfulo Counseling: I discussed with the patient the risks of Litfulo therapy including but not limited to upper respiratory tract infections, shingles, cold sores, and nausea. Live vaccines should be avoided.  This medication has been linked to serious infections; higher rate of mortality; malignancy and lymphoproliferative disorders; major adverse cardiovascular events; thrombosis; gastrointestinal perforations; neutropenia; lymphopenia; anemia; liver enzyme elevations; and lipid elevations.
Nsaids Pregnancy And Lactation Text: These medications are considered safe up to 30 weeks gestation. It is excreted in breast milk.
Hydroxyzine Counseling: Patient advised that the medication is sedating and not to drive a car after taking this medication.  Patient informed of potential adverse effects including but not limited to dry mouth, urinary retention, and blurry vision.  The patient verbalized understanding of the proper use and possible adverse effects of hydroxyzine.  All of the patient's questions and concerns were addressed.

## 2024-03-04 NOTE — PROCEDURE: PRESCRIPTION MEDICATION MANAGEMENT
Detail Level: Zone
Continue Regimen: Doxy 100mg QD
Initiate Treatment: Clinda 1% gel QD, tret 0.1% QD
Render In Strict Bullet Format?: No

## 2024-04-22 ENCOUNTER — RX ONLY (OUTPATIENT)
Age: 59
Setting detail: RX ONLY
End: 2024-04-22

## 2024-04-22 RX ORDER — DOXYCYCLINE 100 MG/1
CAPSULE ORAL
Qty: 60 | Refills: 0 | Status: CANCELLED

## 2024-05-16 ENCOUNTER — HOSPITAL ENCOUNTER (OUTPATIENT)
Dept: LAB | Facility: MEDICAL CENTER | Age: 59
End: 2024-05-16
Attending: INTERNAL MEDICINE
Payer: COMMERCIAL

## 2024-05-16 LAB
25(OH)D3 SERPL-MCNC: 51 NG/ML (ref 30–100)
ALBUMIN SERPL BCP-MCNC: 4.1 G/DL (ref 3.2–4.9)
ALBUMIN/GLOB SERPL: 1.7 G/DL
ALP SERPL-CCNC: 50 U/L (ref 30–99)
ALT SERPL-CCNC: 31 U/L (ref 2–50)
ANION GAP SERPL CALC-SCNC: 11 MMOL/L (ref 7–16)
AST SERPL-CCNC: 20 U/L (ref 12–45)
BILIRUB SERPL-MCNC: 0.8 MG/DL (ref 0.1–1.5)
BUN SERPL-MCNC: 15 MG/DL (ref 8–22)
CALCIUM ALBUM COR SERPL-MCNC: 9 MG/DL (ref 8.5–10.5)
CALCIUM SERPL-MCNC: 9.1 MG/DL (ref 8.5–10.5)
CHLORIDE SERPL-SCNC: 106 MMOL/L (ref 96–112)
CO2 SERPL-SCNC: 23 MMOL/L (ref 20–33)
CREAT SERPL-MCNC: 1 MG/DL (ref 0.5–1.4)
ERYTHROCYTE [DISTWIDTH] IN BLOOD BY AUTOMATED COUNT: 43.1 FL (ref 35.9–50)
EST. AVERAGE GLUCOSE BLD GHB EST-MCNC: 103 MG/DL
ESTRADIOL SERPL-MCNC: 31.5 PG/ML
FSH SERPL-ACNC: <0.3 MIU/ML (ref 1.5–12.4)
GFR SERPLBLD CREATININE-BSD FMLA CKD-EPI: 86 ML/MIN/1.73 M 2
GLOBULIN SER CALC-MCNC: 2.4 G/DL (ref 1.9–3.5)
GLUCOSE SERPL-MCNC: 102 MG/DL (ref 65–99)
HBA1C MFR BLD: 5.2 % (ref 4–5.6)
HCT VFR BLD AUTO: 52 % (ref 42–52)
HGB BLD-MCNC: 18.1 G/DL (ref 14–18)
LH SERPL-ACNC: <0.3 IU/L (ref 1.7–8.6)
MCH RBC QN AUTO: 32 PG (ref 27–33)
MCHC RBC AUTO-ENTMCNC: 34.8 G/DL (ref 32.3–36.5)
MCV RBC AUTO: 91.9 FL (ref 81.4–97.8)
PLATELET # BLD AUTO: 187 K/UL (ref 164–446)
PMV BLD AUTO: 10.6 FL (ref 9–12.9)
POTASSIUM SERPL-SCNC: 4.2 MMOL/L (ref 3.6–5.5)
PROLACTIN SERPL-MCNC: 14.2 NG/ML (ref 2.1–17.7)
PROT SERPL-MCNC: 6.5 G/DL (ref 6–8.2)
RBC # BLD AUTO: 5.66 M/UL (ref 4.7–6.1)
SODIUM SERPL-SCNC: 140 MMOL/L (ref 135–145)
T3FREE SERPL-MCNC: 3.58 PG/ML (ref 2–4.4)
T4 FREE SERPL-MCNC: 1.15 NG/DL (ref 0.93–1.7)
TESTOST SERPL-MCNC: 605 NG/DL (ref 175–781)
THYROPEROXIDASE AB SERPL-ACNC: <9 IU/ML (ref 0–9)
TSH SERPL DL<=0.005 MIU/L-ACNC: 3.54 UIU/ML (ref 0.38–5.33)
VIT B12 SERPL-MCNC: 399 PG/ML (ref 211–911)
WBC # BLD AUTO: 7 K/UL (ref 4.8–10.8)

## 2024-05-18 LAB — C PEPTIDE SERPL-MCNC: 2.7 NG/ML (ref 0.5–3.3)

## 2024-05-19 LAB — SHBG SERPL-SCNC: 25 NMOL/L (ref 19–76)

## 2024-05-30 ENCOUNTER — APPOINTMENT (RX ONLY)
Dept: URBAN - METROPOLITAN AREA CLINIC 36 | Facility: CLINIC | Age: 59
Setting detail: DERMATOLOGY
End: 2024-05-30

## 2024-05-30 DIAGNOSIS — L71.1 RHINOPHYMA: ICD-10-CM

## 2024-05-30 PROCEDURE — ? ADDITIONAL NOTES

## 2024-05-30 PROCEDURE — ? MEDICAL CONSULTATION: LASER RESURFACING

## 2024-05-30 PROCEDURE — 99212 OFFICE O/P EST SF 10 MIN: CPT

## 2024-05-30 PROCEDURE — ? COUNSELING

## 2024-05-30 ASSESSMENT — LOCATION SIMPLE DESCRIPTION DERM: LOCATION SIMPLE: NOSE

## 2024-05-30 ASSESSMENT — LOCATION DETAILED DESCRIPTION DERM: LOCATION DETAILED: NASAL SUPRATIP

## 2024-05-30 ASSESSMENT — LOCATION ZONE DERM: LOCATION ZONE: NOSE

## 2024-05-30 NOTE — PROCEDURE: ADDITIONAL NOTES
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 \\Martin has [employer] insurance and we would need authorization. \\Martin is on doxycycline for his rosacea. His insurance end of year is in July, and he’s hoping to do it before then. 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 He understands care after and he won’t want to travel.\\nRecommends alastin silk shield sunscreen, given samples of that and Elta sport. \\n\\nPt to start [facility]. Use everywhere but focus on the nose area.
Detail Level: Zone
Render Risk Assessment In Note?: yes
Additional Notes: Start Tretinoin qhs to face

## 2024-06-27 ENCOUNTER — APPOINTMENT (RX ONLY)
Dept: URBAN - METROPOLITAN AREA CLINIC 36 | Facility: CLINIC | Age: 59
Setting detail: DERMATOLOGY
End: 2024-06-27

## 2024-06-27 DIAGNOSIS — L71.1 RHINOPHYMA: ICD-10-CM

## 2024-06-27 PROCEDURE — 30120 REVISION OF NOSE: CPT

## 2024-06-27 PROCEDURE — ? RHINOPHYMA EXCISION

## 2024-06-27 ASSESSMENT — LOCATION DETAILED DESCRIPTION DERM: LOCATION DETAILED: NASAL SUPRATIP

## 2024-06-27 ASSESSMENT — LOCATION ZONE DERM: LOCATION ZONE: NOSE

## 2024-06-27 ASSESSMENT — LOCATION SIMPLE DESCRIPTION DERM: LOCATION SIMPLE: NOSE

## 2024-06-27 NOTE — PROCEDURE: RHINOPHYMA EXCISION
Detail Level: Simple
Preop Length In Cm (Optional): 0
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
Exicision Text: Excision - Debulking, Sculpting, Contouring
Anesthesia Volume In Cc: 19.9
Wound Care: Petrolatum
Dressing: bandage
Wound Check: 3 days
Anesthesia Type: 1% lidocaine with epinephrine
Additional Anesthesia Volume In Cc: 6
Estimated Blood Loss (Cc): minimal
Procedure Text: A 15 blade was used to tangentially debulk excessive rhinophymatous nasal tissue, sparing as many follicular islets as possible. An electrosurgical cautery unit with a wire loop was used to sculpt and reshape the nose. A Co2 laser was was then used to contour the nose.
Consent was obtained from the patient. The risks and benefits to therapy were discussed in detail. Specifically, the risks of infection, scarring, bleeding, prolonged wound healing, incomplete removal, inability to restore the nose to the exact predisease shape, allergy to anesthesia, nerve injury and recurrence were addressed. Prior to the procedure, the treatment site was clearly identified and confirmed by the patient. All components of Universal Protocol/PAUSE Rule completed. Photographs of the patient prior to the onset of the disease were reviewed.
Post-Care Instructions: I reviewed with the patient in detail post-care instructions. He is to do vinegar soaks four times a day, and literally apply Vaseline. He needs to stay on antibiotics as prescribed. I advised him to use gauze to gently wipe off area after vinegar soaks. Patient is not to engage in any heavy lifting, exercise, or swimming for the next 7 days. Should the patient develop any fevers, chills, bleeding, severe pain patient will contact the office immediately.
Bill For Surgical Tray: no

## 2024-07-03 ENCOUNTER — APPOINTMENT (RX ONLY)
Dept: URBAN - METROPOLITAN AREA CLINIC 36 | Facility: CLINIC | Age: 59
Setting detail: DERMATOLOGY
End: 2024-07-03

## 2024-07-03 DIAGNOSIS — Z48.817 ENCOUNTER FOR SURGICAL AFTERCARE FOLLOWING SURGERY ON THE SKIN AND SUBCUTANEOUS TISSUE: ICD-10-CM

## 2024-07-03 PROCEDURE — ? POST-OP WOUND CHECK

## 2024-07-03 PROCEDURE — 99024 POSTOP FOLLOW-UP VISIT: CPT

## 2024-07-03 ASSESSMENT — LOCATION SIMPLE DESCRIPTION DERM: LOCATION SIMPLE: NOSE

## 2024-07-03 ASSESSMENT — LOCATION DETAILED DESCRIPTION DERM: LOCATION DETAILED: NASAL SUPRATIP

## 2024-07-03 ASSESSMENT — LOCATION ZONE DERM: LOCATION ZONE: NOSE

## 2024-07-03 NOTE — PROCEDURE: POST-OP WOUND CHECK
Detail Level: Simple
Add 40439 Cpt? (Important Note: In 2017 The Use Of 23064 Is Being Tracked By Cms To Determine Future Global Period Reimbursement For Global Periods): yes

## 2024-07-23 ENCOUNTER — APPOINTMENT (RX ONLY)
Dept: URBAN - METROPOLITAN AREA CLINIC 36 | Facility: CLINIC | Age: 59
Setting detail: DERMATOLOGY
End: 2024-07-23

## 2024-07-23 DIAGNOSIS — L663 OTHER SPECIFIED DISEASES OF HAIR AND HAIR FOLLICLES: ICD-10-CM

## 2024-07-23 DIAGNOSIS — L73.9 FOLLICULAR DISORDER, UNSPECIFIED: ICD-10-CM

## 2024-07-23 DIAGNOSIS — L71.8 OTHER ROSACEA: ICD-10-CM

## 2024-07-23 DIAGNOSIS — L71.1 RHINOPHYMA: ICD-10-CM

## 2024-07-23 DIAGNOSIS — L738 OTHER SPECIFIED DISEASES OF HAIR AND HAIR FOLLICLES: ICD-10-CM

## 2024-07-23 PROBLEM — L02.12 FURUNCLE OF NECK: Status: ACTIVE | Noted: 2024-07-23

## 2024-07-23 PROBLEM — L02.821 FURUNCLE OF HEAD [ANY PART, EXCEPT FACE]: Status: ACTIVE | Noted: 2024-07-23

## 2024-07-23 PROCEDURE — ? ADDITIONAL NOTES

## 2024-07-23 PROCEDURE — ? MEDICATION COUNSELING

## 2024-07-23 PROCEDURE — ? COUNSELING

## 2024-07-23 PROCEDURE — ? PRESCRIPTION

## 2024-07-23 PROCEDURE — ? ORDER TESTS

## 2024-07-23 PROCEDURE — ? SUNSCREEN RECOMMENDATIONS

## 2024-07-23 PROCEDURE — 99213 OFFICE O/P EST LOW 20 MIN: CPT

## 2024-07-23 RX ORDER — TRETIONIN 0.5 MG/G
CREAM TOPICAL
Qty: 45 | Refills: 11 | Status: ERX | COMMUNITY
Start: 2024-07-23

## 2024-07-23 RX ADMIN — TRETIONIN: 0.5 CREAM TOPICAL at 00:00

## 2024-07-23 ASSESSMENT — LOCATION DETAILED DESCRIPTION DERM
LOCATION DETAILED: LEFT INFERIOR OCCIPITAL SCALP
LOCATION DETAILED: MID POSTERIOR NECK

## 2024-07-23 ASSESSMENT — LOCATION ZONE DERM
LOCATION ZONE: SCALP
LOCATION ZONE: NECK

## 2024-07-23 ASSESSMENT — LOCATION SIMPLE DESCRIPTION DERM
LOCATION SIMPLE: POSTERIOR NECK
LOCATION SIMPLE: POSTERIOR SCALP

## 2024-07-23 NOTE — PROCEDURE: ADDITIONAL NOTES
Detail Level: Zone
Additional Notes: Well healed from rhinophyma excision, will reassess in 6 weeks might need small touch up on right Ala
Render Risk Assessment In Note?: yes

## 2024-07-23 NOTE — PROCEDURE: MEDICATION COUNSELING
Stelara Counseling:  I discussed with the patient the risks of ustekinumab including but not limited to immunosuppression, malignancy, posterior leukoencephalopathy syndrome, and serious infections.  The patient understands that monitoring is required including a PPD at baseline and must alert us or the primary physician if symptoms of infection or other concerning signs are noted.
Tetracycline Counseling: Patient counseled regarding possible photosensitivity and increased risk for sunburn.  Patient instructed to avoid sunlight, if possible.  When exposed to sunlight, patients should wear protective clothing, sunglasses, and sunscreen.  The patient was instructed to call the office immediately if the following severe adverse effects occur:  hearing changes, easy bruising/bleeding, severe headache, or vision changes.  The patient verbalized understanding of the proper use and possible adverse effects of tetracycline.  All of the patient's questions and concerns were addressed. Patient understands to avoid pregnancy while on therapy due to potential birth defects.
Cimzia Counseling:  I discussed with the patient the risks of Cimzia including but not limited to immunosuppression, allergic reactions and infections.  The patient understands that monitoring is required including a PPD at baseline and must alert us or the primary physician if symptoms of infection or other concerning signs are noted.
Eucrisa Pregnancy And Lactation Text: This medication has not been assigned a Pregnancy Risk Category but animal studies failed to show danger with the topical medication. It is unknown if the medication is excreted in breast milk.
SSKI Counseling:  I discussed with the patient the risks of SSKI including but not limited to thyroid abnormalities, metallic taste, GI upset, fever, headache, acne, arthralgias, paraesthesias, lymphadenopathy, easy bleeding, arrhythmias, and allergic reaction.
Tazorac Pregnancy And Lactation Text: This medication is not safe during pregnancy. It is unknown if this medication is excreted in breast milk.
Mirvaso Counseling: Mirvaso is a topical medication which can decrease superficial blood flow where applied. Side effects are uncommon and include stinging, redness and allergic reactions.
Glycopyrrolate Pregnancy And Lactation Text: This medication is Pregnancy Category B and is considered safe during pregnancy. It is unknown if it is excreted breast milk.
Bactrim Pregnancy And Lactation Text: This medication is Pregnancy Category D and is known to cause fetal risk.  It is also excreted in breast milk.
Cibinqo Counseling: I discussed with the patient the risks of Cibinqo therapy including but not limited to common cold, nausea, headache, cold sores, increased blood CPK levels, dizziness, UTIs, fatigue, acne, and vomitting. Live vaccines should be avoided.  This medication has been linked to serious infections; higher rate of mortality; malignancy and lymphoproliferative disorders; major adverse cardiovascular events; thrombosis; thrombocytopenia and lymphopenia; lipid elevations; and retinal detachment.
Calcipotriene Pregnancy And Lactation Text: The use of this medication during pregnancy or lactation is not recommended as there is insufficient data.
Hyrimoz Counseling:  I discussed with the patient the risks of adalimumab including but not limited to myelosuppression, immunosuppression, autoimmune hepatitis, demyelinating diseases, lymphoma, and serious infections.  The patient understands that monitoring is required including a PPD at baseline and must alert us or the primary physician if symptoms of infection or other concerning signs are noted.
Rituxan Pregnancy And Lactation Text: This medication is Pregnancy Category C and it isn't know if it is safe during pregnancy. It is unknown if this medication is excreted in breast milk but similar antibodies are known to be excreted.
Topical Sulfur Applications Pregnancy And Lactation Text: This medication is Pregnancy Category C and has an unknown safety profile during pregnancy. It is unknown if this topical medication is excreted in breast milk.
Mirvaso Pregnancy And Lactation Text: This medication has not been assigned a Pregnancy Risk Category. It is unknown if the medication is excreted in breast milk.
Olanzapine Pregnancy And Lactation Text: This medication is pregnancy category C.   There are no adequate and well controlled trials with olanzapine in pregnant females.  Olanzapine should be used during pregnancy only if the potential benefit justifies the potential risk to the fetus.   In a study in lactating healthy women, olanzapine was excreted in breast milk.  It is recommended that women taking olanzapine should not breast feed.
Ketoconazole Pregnancy And Lactation Text: This medication is Pregnancy Category C and it isn't know if it is safe during pregnancy. It is also excreted in breast milk and breast feeding isn't recommended.
Cellcept Pregnancy And Lactation Text: This medication is Pregnancy Category D and isn't considered safe during pregnancy. It is unknown if this medication is excreted in breast milk.
Zoryve Counseling:  I discussed with the patient that Zoryve is not for use in the eyes, mouth or vagina. The most commonly reported side effects include diarrhea, headache, insomnia, application site pain, upper respiratory tract infections, and urinary tract infections.  All of the patient's questions and concerns were addressed.
Rhofade Counseling: Rhofade is a topical medication which can decrease superficial blood flow where applied. Side effects are uncommon and include stinging, redness and allergic reactions.
Erivedge Pregnancy And Lactation Text: This medication is Pregnancy Category X and is absolutely contraindicated during pregnancy. It is unknown if it is excreted in breast milk.
Sotyktu Pregnancy And Lactation Text: There is insufficient data to evaluate whether or not Sotyktu is safe to use during pregnancy.? ?It is not known if Sotyktu passes into breast milk and whether or not it is safe to use when breastfeeding.??
Azithromycin Pregnancy And Lactation Text: This medication is considered safe during pregnancy and is also secreted in breast milk.
Clofazimine Counseling:  I discussed with the patient the risks of clofazimine including but not limited to skin and eye pigmentation, liver damage, nausea/vomiting, gastrointestinal bleeding and allergy.
Metronidazole Pregnancy And Lactation Text: This medication is Pregnancy Category B and considered safe during pregnancy.  It is also excreted in breast milk.
Finasteride Female Counseling: Finasteride Counseling:  I discussed with the patient the risks of use of finasteride including but not limited to decreased libido and sexual dysfunction. Explained the teratogenic nature of the medication and stressed the importance of not getting pregnant during treatment. All of the patient's questions and concerns were addressed.
Finasteride Pregnancy And Lactation Text: This medication is absolutely contraindicated during pregnancy. It is unknown if it is excreted in breast milk.
Clofazimine Pregnancy And Lactation Text: This medication is Pregnancy Category C and isn't considered safe during pregnancy. It is excreted in breast milk.
Xeljanz Counseling: I discussed with the patient the risks of Xeljanz therapy including increased risk of infection, liver issues, headache, diarrhea, or cold symptoms. Live vaccines should be avoided. They were instructed to call if they have any problems.
Cyclophosphamide Counseling:  I discussed with the patient the risks of cyclophosphamide including but not limited to hair loss, hormonal abnormalities, decreased fertility, abdominal pain, diarrhea, nausea and vomiting, bone marrow suppression and infection. The patient understands that monitoring is required while taking this medication.
Minocycline Counseling: Patient advised regarding possible photosensitivity and discoloration of the teeth, skin, lips, tongue and gums.  Patient instructed to avoid sunlight, if possible.  When exposed to sunlight, patients should wear protective clothing, sunglasses, and sunscreen.  The patient was instructed to call the office immediately if the following severe adverse effects occur:  hearing changes, easy bruising/bleeding, severe headache, or vision changes.  The patient verbalized understanding of the proper use and possible adverse effects of minocycline.  All of the patient's questions and concerns were addressed.
Stelara Pregnancy And Lactation Text: This medication is Pregnancy Category B and is considered safe during pregnancy. It is unknown if this medication is excreted in breast milk.
Libtayo Counseling- I discussed with the patient the risks of Libtayo including but not limited to nausea, vomiting, diarrhea, and bone or muscle pain.  The patient verbalized understanding of the proper use and possible adverse effects of Libtayo.  All of the patient's questions and concerns were addressed.
Terbinafine Counseling: Patient counseling regarding adverse effects of terbinafine including but not limited to headache, diarrhea, rash, upset stomach, liver function test abnormalities, itching, taste/smell disturbance, nausea, abdominal pain, and flatulence.  There is a rare possibility of liver failure that can occur when taking terbinafine.  The patient understands that a baseline LFT and kidney function test may be required. The patient verbalized understanding of the proper use and possible adverse effects of terbinafine.  All of the patient's questions and concerns were addressed.
Sski Pregnancy And Lactation Text: This medication is Pregnancy Category D and isn't considered safe during pregnancy. It is excreted in breast milk.
Cimzia Pregnancy And Lactation Text: This medication crosses the placenta but can be considered safe in certain situations. Cimzia may be excreted in breast milk.
Aklief counseling:  Patient advised to apply a pea-sized amount only at bedtime and wait 30 minutes after washing their face before applying.  If too drying, patient may add a non-comedogenic moisturizer.  The most commonly reported side effects including irritation, redness, scaling, dryness, stinging, burning, itching, and increased risk of sunburn.  The patient verbalized understanding of the proper use and possible adverse effects of retinoids.  All of the patient's questions and concerns were addressed.
Topical Clindamycin Counseling: Patient counseled that this medication may cause skin irritation or allergic reactions.  In the event of skin irritation, the patient was advised to reduce the amount of the drug applied or use it less frequently.   The patient verbalized understanding of the proper use and possible adverse effects of clindamycin.  All of the patient's questions and concerns were addressed.
Cephalexin Counseling: I counseled the patient regarding use of cephalexin as an antibiotic for prophylactic and/or therapeutic purposes. Cephalexin (commonly prescribed under brand name Keflex) is a cephalosporin antibiotic which is active against numerous classes of bacteria, including most skin bacteria. Side effects may include nausea, diarrhea, gastrointestinal upset, rash, hives, yeast infections, and in rare cases, hepatitis, kidney disease, seizures, fever, confusion, neurologic symptoms, and others. Patients with severe allergies to penicillin medications are cautioned that there is about a 10% incidence of cross-reactivity with cephalosporins. When possible, patients with penicillin allergies should use alternatives to cephalosporins for antibiotic therapy.
Albendazole Counseling:  I discussed with the patient the risks of albendazole including but not limited to cytopenia, kidney damage, nausea/vomiting and severe allergy.  The patient understands that this medication is being used in an off-label manner.
Hydroquinone Counseling:  Patient advised that medication may result in skin irritation, lightening (hypopigmentation), dryness, and burning.  In the event of skin irritation, the patient was advised to reduce the amount of the drug applied or use it less frequently.  Rarely, spots that are treated with hydroquinone can become darker (pseudoochronosis).  Should this occur, patient instructed to stop medication and call the office. The patient verbalized understanding of the proper use and possible adverse effects of hydroquinone.  All of the patient's questions and concerns were addressed.
Hydroxychloroquine Counseling:  I discussed with the patient that a baseline ophthalmologic exam is needed at the start of therapy and every year thereafter while on therapy. A CBC may also be warranted for monitoring.  The side effects of this medication were discussed with the patient, including but not limited to agranulocytosis, aplastic anemia, seizures, rashes, retinopathy, and liver toxicity. Patient instructed to call the office should any adverse effect occur.  The patient verbalized understanding of the proper use and possible adverse effects of Plaquenil.  All the patient's questions and concerns were addressed.
Tetracycline Pregnancy And Lactation Text: This medication is Pregnancy Category D and not consider safe during pregnancy. It is also excreted in breast milk.
Cephalexin Pregnancy And Lactation Text: This medication is Pregnancy Category B and considered safe during pregnancy.  It is also excreted in breast milk but can be used safely for shorter doses.
Albendazole Pregnancy And Lactation Text: This medication is Pregnancy Category C and it isn't known if it is safe during pregnancy. It is also excreted in breast milk.
Thalidomide Counseling: I discussed with the patient the risks of thalidomide including but not limited to birth defects, anxiety, weakness, chest pain, dizziness, cough and severe allergy.
Cantharidin Pregnancy And Lactation Text: This medication has not been proven safe during pregnancy. It is unknown if this medication is excreted in breast milk.
Cosentyx Counseling:  I discussed with the patient the risks of Cosentyx including but not limited to worsening of Crohn's disease, immunosuppression, allergic reactions and infections.  The patient understands that monitoring is required including a PPD at baseline and must alert us or the primary physician if symptoms of infection or other concerning signs are noted.
Aklief Pregnancy And Lactation Text: It is unknown if this medication is safe to use during pregnancy.  It is unknown if this medication is excreted in breast milk.  Breastfeeding women should use the topical cream on the smallest area of the skin for the shortest time needed while breastfeeding.  Do not apply to nipple and areola.
Opzelura Counseling:  I discussed with the patient the risks of Opzelura including but not limited to nasopharngitis, bronchitis, ear infection, eosinophila, hives, diarrhea, folliculitis, tonsillitis, and rhinorrhea.  Taken orally, this medication has been linked to serious infections; higher rate of mortality; malignancy and lymphoproliferative disorders; major adverse cardiovascular events; thrombosis; thrombocytopenia, anemia, and neutropenia; and lipid elevations.
Hydroxychloroquine Pregnancy And Lactation Text: This medication has been shown to cause fetal harm but it isn't assigned a Pregnancy Risk Category. There are small amounts excreted in breast milk.
Siliq Counseling:  I discussed with the patient the risks of Siliq including but not limited to new or worsening depression, suicidal thoughts and behavior, immunosuppression, malignancy, posterior leukoencephalopathy syndrome, and serious infections.  The patient understands that monitoring is required including a PPD at baseline and must alert us or the primary physician if symptoms of infection or other concerning signs are noted. There is also a special program designed to monitor depression which is required with Siliq.
Cantharidin Counseling:  I discussed with the patient the risks of Cantharidin including but not limited to pain, redness, burning, itching, and blistering.
Wartpeel Counseling:  I discussed with the patient the risks of Wartpeel including but not limited to erythema, scaling, itching, weeping, crusting, and pain.
Cibinqo Pregnancy And Lactation Text: It is unknown if this medication will adversely affect pregnancy or breast feeding.  You should not take this medication if you are currently pregnant or planning a pregnancy or while breastfeeding.
Zoryve Pregnancy And Lactation Text: It is unknown if this medication can cause problems during pregnancy and breastfeeding.
Oral Minoxidil Counseling- I discussed with the patient the risks of oral minoxidil including but not limited to shortness of breath, swelling of the feet or ankles, dizziness, lightheadedness, unwanted hair growth and allergic reaction.  The patient verbalized understanding of the proper use and possible adverse effects of oral minoxidil.  All of the patient's questions and concerns were addressed.
Ilumya Counseling: I discussed with the patient the risks of tildrakizumab including but not limited to immunosuppression, malignancy, posterior leukoencephalopathy syndrome, and serious infections.  The patient understands that monitoring is required including a PPD at baseline and must alert us or the primary physician if symptoms of infection or other concerning signs are noted.
Siliq Pregnancy And Lactation Text: The risk during pregnancy and breastfeeding is uncertain with this medication.
5-Fu Counseling: 5-Fluorouracil Counseling:  I discussed with the patient the risks of 5-fluorouracil including but not limited to erythema, scaling, itching, weeping, crusting, and pain.
Solaraze Counseling:  I discussed with the patient the risks of Solaraze including but not limited to erythema, scaling, itching, weeping, crusting, and pain.
Oral Minoxidil Pregnancy And Lactation Text: This medication should only be used when clearly needed if you are pregnant, attempting to become pregnant or breast feeding.
Libtayo Pregnancy And Lactation Text: This medication is contraindicated in pregnancy and when breast feeding.
Colchicine Counseling:  Patient counseled regarding adverse effects including but not limited to stomach upset (nausea, vomiting, stomach pain, or diarrhea).  Patient instructed to limit alcohol consumption while taking this medication.  Colchicine may reduce blood counts especially with prolonged use.  The patient understands that monitoring of kidney function and blood counts may be required, especially at baseline. The patient verbalized understanding of the proper use and possible adverse effects of colchicine.  All of the patient's questions and concerns were addressed.
Birth Control Pills Counseling: Birth Control Pill Counseling: I discussed with the patient the potential side effects of OCPs including but not limited to increased risk of stroke, heart attack, thrombophlebitis, deep venous thrombosis, hepatic adenomas, breast changes, GI upset, headaches, and depression.  The patient verbalized understanding of the proper use and possible adverse effects of OCPs. All of the patient's questions and concerns were addressed.
Xelgeraldoz Pregnancy And Lactation Text: This medication is Pregnancy Category D and is not considered safe during pregnancy.  The risk during breast feeding is also uncertain.
Terbinafine Pregnancy And Lactation Text: This medication is Pregnancy Category B and is considered safe during pregnancy. It is also excreted in breast milk and breast feeding isn't recommended.
Topical Clindamycin Pregnancy And Lactation Text: This medication is Pregnancy Category B and is considered safe during pregnancy. It is unknown if it is excreted in breast milk.
Taltz Counseling: I discussed with the patient the risks of ixekizumab including but not limited to immunosuppression, serious infections, worsening of inflammatory bowel disease and drug reactions.  The patient understands that monitoring is required including a PPD at baseline and must alert us or the primary physician if symptoms of infection or other concerning signs are noted.
Azelaic Acid Counseling: Patient counseled that medicine may cause skin irritation and to avoid applying near the eyes.  In the event of skin irritation, the patient was advised to reduce the amount of the drug applied or use it less frequently.   The patient verbalized understanding of the proper use and possible adverse effects of azelaic acid.  All of the patient's questions and concerns were addressed.
Topical Ketoconazole Counseling: Patient counseled that this medication may cause skin irritation or allergic reactions.  In the event of skin irritation, the patient was advised to reduce the amount of the drug applied or use it less frequently.   The patient verbalized understanding of the proper use and possible adverse effects of ketoconazole.  All of the patient's questions and concerns were addressed.
Fluconazole Counseling:  Patient counseled regarding adverse effects of fluconazole including but not limited to headache, diarrhea, nausea, upset stomach, liver function test abnormalities, taste disturbance, and stomach pain.  There is a rare possibility of liver failure that can occur when taking fluconazole.  The patient understands that monitoring of LFTs and kidney function test may be required, especially at baseline. The patient verbalized understanding of the proper use and possible adverse effects of fluconazole.  All of the patient's questions and concerns were addressed.
Low Dose Naltrexone Counseling- I discussed with the patient the potential risks and side effects of low dose naltrexone including but not limited to: more vivid dreams, headaches, nausea, vomiting, abdominal pain, fatigue, dizziness, and anxiety.
Opzelura Pregnancy And Lactation Text: There is insufficient data to evaluate drug-associated risk for major birth defects, miscarriage, or other adverse maternal or fetal outcomes.  There is a pregnancy registry that monitors pregnancy outcomes in pregnant persons exposed to the medication during pregnancy.  It is unknown if this medication is excreted in breast milk.  Do not breastfeed during treatment and for about 4 weeks after the last dose.
Cyclophosphamide Pregnancy And Lactation Text: This medication is Pregnancy Category D and it isn't considered safe during pregnancy. This medication is excreted in breast milk.
Ivermectin Counseling:  Patient instructed to take medication on an empty stomach with a full glass of water.  Patient informed of potential adverse effects including but not limited to nausea, diarrhea, dizziness, itching, and swelling of the extremities or lymph nodes.  The patient verbalized understanding of the proper use and possible adverse effects of ivermectin.  All of the patient's questions and concerns were addressed.
Zyclara Counseling:  I discussed with the patient the risks of imiquimod including but not limited to erythema, scaling, itching, weeping, crusting, and pain.  Patient understands that the inflammatory response to imiquimod is variable from person to person and was educated regarded proper titration schedule.  If flu-like symptoms develop, patient knows to discontinue the medication and contact us.
Clindamycin Counseling: I counseled the patient regarding use of clindamycin as an antibiotic for prophylactic and/or therapeutic purposes. Clindamycin is active against numerous classes of bacteria, including skin bacteria. Side effects may include nausea, diarrhea, gastrointestinal upset, rash, hives, yeast infections, and in rare cases, colitis.
Litfulo Counseling: I discussed with the patient the risks of Litfulo therapy including but not limited to upper respiratory tract infections, shingles, cold sores, and nausea. Live vaccines should be avoided.  This medication has been linked to serious infections; higher rate of mortality; malignancy and lymphoproliferative disorders; major adverse cardiovascular events; thrombosis; gastrointestinal perforations; neutropenia; lymphopenia; anemia; liver enzyme elevations; and lipid elevations.
Zyclara Pregnancy And Lactation Text: This medication is Pregnancy Category C. It is unknown if this medication is excreted in breast milk.
Litfulo Pregnancy And Lactation Text: Based on animal studies, Lifulo may cause embryo-fetal harm when administered to pregnant women.  The medication should not be used in pregnancy.  Breastfeeding is not recommended during treatment.
Quinolones Counseling:  I discussed with the patient the risks of fluoroquinolones including but not limited to GI upset, allergic reaction, drug rash, diarrhea, dizziness, photosensitivity, yeast infections, liver function test abnormalities, tendonitis/tendon rupture.
Acitretin Counseling:  I discussed with the patient the risks of acitretin including but not limited to hair loss, dry lips/skin/eyes, liver damage, hyperlipidemia, depression/suicidal ideation, photosensitivity.  Serious rare side effects can include but are not limited to pancreatitis, pseudotumor cerebri, bony changes, clot formation/stroke/heart attack.  Patient understands that alcohol is contraindicated since it can result in liver toxicity and significantly prolong the elimination of the drug by many years.
Cimetidine Counseling:  I discussed with the patient the risks of Cimetidine including but not limited to gynecomastia, headache, diarrhea, nausea, drowsiness, arrhythmias, pancreatitis, skin rashes, psychosis, bone marrow suppression and kidney toxicity.
Birth Control Pills Pregnancy And Lactation Text: This medication should be avoided if pregnant and for the first 30 days post-partum.
Simponi Counseling:  I discussed with the patient the risks of golimumab including but not limited to myelosuppression, immunosuppression, autoimmune hepatitis, demyelinating diseases, lymphoma, and serious infections.  The patient understands that monitoring is required including a PPD at baseline and must alert us or the primary physician if symptoms of infection or other concerning signs are noted.
5-Fu Pregnancy And Lactation Text: This medication is Pregnancy Category X and contraindicated in pregnancy and in women who may become pregnant. It is unknown if this medication is excreted in breast milk.
Fluconazole Pregnancy And Lactation Text: This medication is Pregnancy Category C and it isn't know if it is safe during pregnancy. It is also excreted in breast milk.
Cyclosporine Counseling:  I discussed with the patient the risks of cyclosporine including but not limited to hypertension, gingival hyperplasia,myelosuppression, immunosuppression, liver damage, kidney damage, neurotoxicity, lymphoma, and serious infections. The patient understands that monitoring is required including baseline blood pressure, CBC, CMP, lipid panel and uric acid, and then 1-2 times monthly CMP and blood pressure.
Otezla Counseling: The side effects of Otezla were discussed with the patient, including but not limited to worsening or new depression, weight loss, diarrhea, nausea, upper respiratory tract infection, and headache. Patient instructed to call the office should any adverse effect occur.  The patient verbalized understanding of the proper use and possible adverse effects of Otezla.  All the patient's questions and concerns were addressed.
Imiquimod Counseling:  I discussed with the patient the risks of imiquimod including but not limited to erythema, scaling, itching, weeping, crusting, and pain.  Patient understands that the inflammatory response to imiquimod is variable from person to person and was educated regarded proper titration schedule.  If flu-like symptoms develop, patient knows to discontinue the medication and contact us.
Odomzo Counseling- I discussed with the patient the risks of Odomzo including but not limited to nausea, vomiting, diarrhea, constipation, weight loss, changes in the sense of taste, decreased appetite, muscle spasms, and hair loss.  The patient verbalized understanding of the proper use and possible adverse effects of Odomzo.  All of the patient's questions and concerns were addressed.
Solaraze Pregnancy And Lactation Text: This medication is Pregnancy Category B and is considered safe. There is some data to suggest avoiding during the third trimester. It is unknown if this medication is excreted in breast milk.
Azelaic Acid Pregnancy And Lactation Text: This medication is considered safe during pregnancy and breast feeding.
Otezla Pregnancy And Lactation Text: This medication is Pregnancy Category C and it isn't known if it is safe during pregnancy. It is unknown if it is excreted in breast milk.
Tremfya Counseling: I discussed with the patient the risks of guselkumab including but not limited to immunosuppression, serious infections, worsening of inflammatory bowel disease and drug reactions.  The patient understands that monitoring is required including a PPD at baseline and must alert us or the primary physician if symptoms of infection or other concerning signs are noted.
Bexarotene Pregnancy And Lactation Text: This medication is Pregnancy Category X and should not be given to women who are pregnant or may become pregnant. This medication should not be used if you are breast feeding.
Dapsone Counseling: I discussed with the patient the risks of dapsone including but not limited to hemolytic anemia, agranulocytosis, rashes, methemoglobinemia, kidney failure, peripheral neuropathy, headaches, GI upset, and liver toxicity.  Patients who start dapsone require monitoring including baseline LFTs and weekly CBCs for the first month, then every month thereafter.  The patient verbalized understanding of the proper use and possible adverse effects of dapsone.  All of the patient's questions and concerns were addressed.
Tranexamic Acid Counseling:  Patient advised of the small risk of bleeding problems with tranexamic acid. They were also instructed to call if they developed any nausea, vomiting or diarrhea. All of the patient's questions and concerns were addressed.
Picato Counseling:  I discussed with the patient the risks of Picato including but not limited to erythema, scaling, itching, weeping, crusting, and pain.
Clindamycin Pregnancy And Lactation Text: This medication can be used in pregnancy if certain situations. Clindamycin is also present in breast milk.
Low Dose Naltrexone Pregnancy And Lactation Text: Naltrexone is pregnancy category C.  There have been no adequate and well-controlled studies in pregnant women.  It should be used in pregnancy only if the potential benefit justifies the potential risk to the fetus.   Limited data indicates that naltrexone is minimally excreted into breastmilk.
Dupixent Counseling: I discussed with the patient the risks of dupilumab including but not limited to eye infection and irritation, cold sores, injection site reactions, worsening of asthma, allergic reactions and increased risk of parasitic infection.  Live vaccines should be avoided while taking dupilumab. Dupilumab will also interact with certain medications such as warfarin and cyclosporine. The patient understands that monitoring is required and they must alert us or the primary physician if symptoms of infection or other concerning signs are noted.
Tranexamic Acid Pregnancy And Lactation Text: It is unknown if this medication is safe during pregnancy or breast feeding.
Drysol Counseling:  I discussed with the patient the risks of drysol/aluminum chloride including but not limited to skin rash, itching, irritation, burning.
Infliximab Counseling:  I discussed with the patient the risks of infliximab including but not limited to myelosuppression, immunosuppression, autoimmune hepatitis, demyelinating diseases, lymphoma, and serious infections.  The patient understands that monitoring is required including a PPD at baseline and must alert us or the primary physician if symptoms of infection or other concerning signs are noted.
Acitretin Pregnancy And Lactation Text: This medication is Pregnancy Category X and should not be given to women who are pregnant or may become pregnant in the future. This medication is excreted in breast milk.
Niacinamide Counseling: I recommended taking niacin or niacinamide, also know as vitamin B3, twice daily. Recent evidence suggests that taking vitamin B3 (500 mg twice daily) can reduce the risk of actinic keratoses and non-melanoma skin cancers. Side effects of vitamin B3 include flushing and headache.
Cyclosporine Pregnancy And Lactation Text: This medication is Pregnancy Category C and it isn't know if it is safe during pregnancy. This medication is excreted in breast milk.
Olumiant Counseling: I discussed with the patient the risks of Olumiant therapy including but not limited to upper respiratory tract infections, shingles, cold sores, and nausea. Live vaccines should be avoided.  This medication has been linked to serious infections; higher rate of mortality; malignancy and lymphoproliferative disorders; major adverse cardiovascular events; thrombosis; gastrointestinal perforations; neutropenia; lymphopenia; anemia; liver enzyme elevations; and lipid elevations.
Soolantra Counseling: I discussed with the patients the risks of topial Soolantra. This is a medicine which decreases the number of mites and inflammation in the skin. You experience burning, stinging, eye irritation or allergic reactions.  Please call our office if you develop any problems from using this medication.
Detail Level: Zone
Griseofulvin Counseling:  I discussed with the patient the risks of griseofulvin including but not limited to photosensitivity, cytopenia, liver damage, nausea/vomiting and severe allergy.  The patient understands that this medication is best absorbed when taken with a fatty meal (e.g., ice cream or french fries).
Spironolactone Counseling: Patient advised regarding risks of diarrhea, abdominal pain, hyperkalemia, birth defects (for female patients), liver toxicity and renal toxicity. The patient may need blood work to monitor liver and kidney function and potassium levels while on therapy. The patient verbalized understanding of the proper use and possible adverse effects of spironolactone.  All of the patient's questions and concerns were addressed.
Soolantra Pregnancy And Lactation Text: This medication is Pregnancy Category C. This medication is considered safe during breast feeding.
Spironolactone Pregnancy And Lactation Text: This medication can cause feminization of the male fetus and should be avoided during pregnancy. The active metabolite is also found in breast milk.
Skyrizi Counseling: I discussed with the patient the risks of risankizumab-rzaa including but not limited to immunosuppression, and serious infections.  The patient understands that monitoring is required including a PPD at baseline and must alert us or the primary physician if symptoms of infection or other concerning signs are noted.
Methotrexate Counseling:  Patient counseled regarding adverse effects of methotrexate including but not limited to nausea, vomiting, abnormalities in liver function tests. Patients may develop mouth sores, rash, diarrhea, and abnormalities in blood counts. The patient understands that monitoring is required including LFT's and blood counts.  There is a rare possibility of scarring of the liver and lung problems that can occur when taking methotrexate. Persistent nausea, loss of appetite, pale stools, dark urine, cough, and shortness of breath should be reported immediately. Patient advised to discontinue methotrexate treatment at least three months before attempting to become pregnant.  I discussed the need for folate supplements while taking methotrexate.  These supplements can decrease side effects during methotrexate treatment. The patient verbalized understanding of the proper use and possible adverse effects of methotrexate.  All of the patient's questions and concerns were addressed.
Adbry Counseling: I discussed with the patient the risks of tralokinumab including but not limited to eye infection and irritation, cold sores, injection site reactions, worsening of asthma, allergic reactions and increased risk of parasitic infection.  Live vaccines should be avoided while taking tralokinumab. The patient understands that monitoring is required and they must alert us or the primary physician if symptoms of infection or other concerning signs are noted.
Isotretinoin Counseling: Patient should get monthly blood tests, not donate blood, not drive at night if vision affected, not share medication, and not undergo elective surgery for 6 months after tx completed. Side effects reviewed, pt to contact office should one occur.
Rifampin Counseling: I discussed with the patient the risks of rifampin including but not limited to liver damage, kidney damage, red-orange body fluids, nausea/vomiting and severe allergy.
Oxybutynin Counseling:  I discussed with the patient the risks of oxybutynin including but not limited to skin rash, drowsiness, dry mouth, difficulty urinating, and blurred vision.
Benzoyl Peroxide Counseling: Patient counseled that medicine may cause skin irritation and bleach clothing.  In the event of skin irritation, the patient was advised to reduce the amount of the drug applied or use it less frequently.   The patient verbalized understanding of the proper use and possible adverse effects of benzoyl peroxide.  All of the patient's questions and concerns were addressed.
Opioid Counseling: I discussed with the patient the potential side effects of opioids including but not limited to addiction, altered mental status, and depression. I stressed avoiding alcohol, benzodiazepines, muscle relaxants and sleep aids unless specifically okayed by a physician. The patient verbalized understanding of the proper use and possible adverse effects of opioids. All of the patient's questions and concerns were addressed. They were instructed to flush the remaining pills down the toilet if they did not need them for pain.
Doxycycline Counseling:  Patient counseled regarding possible photosensitivity and increased risk for sunburn.  Patient instructed to avoid sunlight, if possible.  When exposed to sunlight, patients should wear protective clothing, sunglasses, and sunscreen.  The patient was instructed to call the office immediately if the following severe adverse effects occur:  hearing changes, easy bruising/bleeding, severe headache, or vision changes.  The patient verbalized understanding of the proper use and possible adverse effects of doxycycline.  All of the patient's questions and concerns were addressed.
Dapsone Pregnancy And Lactation Text: This medication is Pregnancy Category C and is not considered safe during pregnancy or breast feeding.
Klisyri Counseling:  I discussed with the patient the risks of Klisyri including but not limited to erythema, scaling, itching, weeping, crusting, and pain.
Topical Metronidazole Counseling: Metronidazole is a topical antibiotic medication. You may experience burning, stinging, redness, or allergic reactions.  Please call our office if you develop any problems from using this medication.
Dupixent Pregnancy And Lactation Text: This medication likely crosses the placenta but the risk for the fetus is uncertain. This medication is excreted in breast milk.
Winlevi Counseling:  I discussed with the patient the risks of topical clascoterone including but not limited to erythema, scaling, itching, and stinging. Patient voiced their understanding.
Doxycycline Pregnancy And Lactation Text: This medication is Pregnancy Category D and not consider safe during pregnancy. It is also excreted in breast milk but is considered safe for shorter treatment courses.
Enbrel Counseling:  I discussed with the patient the risks of etanercept including but not limited to myelosuppression, immunosuppression, autoimmune hepatitis, demyelinating diseases, lymphoma, and infections.  The patient understands that monitoring is required including a PPD at baseline and must alert us or the primary physician if symptoms of infection or other concerning signs are noted.
Opioid Pregnancy And Lactation Text: These medications can lead to premature delivery and should be avoided during pregnancy. These medications are also present in breast milk in small amounts.
Valtrex Counseling: I discussed with the patient the risks of valacyclovir including but not limited to kidney damage, nausea, vomiting and severe allergy.  The patient understands that if the infection seems to be worsening or is not improving, they are to call.
Dutasteride Male Counseling: Dustasteride Counseling:  I discussed with the patient the risks of use of dutasteride including but not limited to decreased libido, decreased ejaculate volume, and gynecomastia. Women who can become pregnant should not handle medication.  All of the patient's questions and concerns were addressed.
Benzoyl Peroxide Pregnancy And Lactation Text: This medication is Pregnancy Category C. It is unknown if benzoyl peroxide is excreted in breast milk.
Topical Metronidazole Pregnancy And Lactation Text: This medication is Pregnancy Category B and considered safe during pregnancy.  It is also considered safe to use while breastfeeding.
Doxepin Counseling:  Patient advised that the medication is sedating and not to drive a car after taking this medication. Patient informed of potential adverse effects including but not limited to dry mouth, urinary retention, and blurry vision.  The patient verbalized understanding of the proper use and possible adverse effects of doxepin.  All of the patient's questions and concerns were addressed.
Niacinamide Pregnancy And Lactation Text: These medications are considered safe during pregnancy.
Griseofulvin Pregnancy And Lactation Text: This medication is Pregnancy Category X and is known to cause serious birth defects. It is unknown if this medication is excreted in breast milk but breast feeding should be avoided.
Olumiant Pregnancy And Lactation Text: Based on animal studies, Olumiant may cause embryo-fetal harm when administered to pregnant women.  The medication should not be used in pregnancy.  Breastfeeding is not recommended during treatment.
Include Pregnancy/Lactation Warning?: No
Protopic Counseling: Patient may experience a mild burning sensation during topical application. Protopic is not approved in children less than 2 years of age. There have been case reports of hematologic and skin malignancies in patients using topical calcineurin inhibitors although causality is questionable.
Bexarotene Counseling:  I discussed with the patient the risks of bexarotene including but not limited to hair loss, dry lips/skin/eyes, liver abnormalities, hyperlipidemia, pancreatitis, depression/suicidal ideation, photosensitivity, drug rash/allergic reactions, hypothyroidism, anemia, leukopenia, infection, cataracts, and teratogenicity.  Patient understands that they will need regular blood tests to check lipid profile, liver function tests, white blood cell count, thyroid function tests and pregnancy test if applicable.
Rifampin Pregnancy And Lactation Text: This medication is Pregnancy Category C and it isn't know if it is safe during pregnancy. It is also excreted in breast milk and should not be used if you are breast feeding.
Rinvoq Counseling: I discussed with the patient the risks of Rinvoq therapy including but not limited to upper respiratory tract infections, shingles, cold sores, bronchitis, nausea, cough, fever, acne, and headache. Live vaccines should be avoided.  This medication has been linked to serious infections; higher rate of mortality; malignancy and lymphoproliferative disorders; major adverse cardiovascular events; thrombosis; thrombocytopenia, anemia, and neutropenia; lipid elevations; liver enzyme elevations; and gastrointestinal perforations.
Adbry Pregnancy And Lactation Text: It is unknown if this medication will adversely affect pregnancy or breast feeding.
Isotretinoin Pregnancy And Lactation Text: This medication is Pregnancy Category X and is considered extremely dangerous during pregnancy. It is unknown if it is excreted in breast milk.
Doxepin Pregnancy And Lactation Text: This medication is Pregnancy Category C and it isn't known if it is safe during pregnancy. It is also excreted in breast milk and breast feeding isn't recommended.
Elidel Counseling: Patient may experience a mild burning sensation during topical application. Elidel is not approved in children less than 2 years of age. There have been case reports of hematologic and skin malignancies in patients using topical calcineurin inhibitors although causality is questionable.
Itraconazole Counseling:  I discussed with the patient the risks of itraconazole including but not limited to liver damage, nausea/vomiting, neuropathy, and severe allergy.  The patient understands that this medication is best absorbed when taken with acidic beverages such as non-diet cola or ginger ale.  The patient understands that monitoring is required including baseline LFTs and repeat LFTs at intervals.  The patient understands that they are to contact us or the primary physician if concerning signs are noted.
Methotrexate Pregnancy And Lactation Text: This medication is Pregnancy Category X and is known to cause fetal harm. This medication is excreted in breast milk.
Xolair Counseling:  Patient informed of potential adverse effects including but not limited to fever, muscle aches, rash and allergic reactions.  The patient verbalized understanding of the proper use and possible adverse effects of Xolair.  All of the patient's questions and concerns were addressed.
Klisyri Pregnancy And Lactation Text: It is unknown if this medication can harm a developing fetus or if it is excreted in breast milk.
Gabapentin Counseling: I discussed with the patient the risks of gabapentin including but not limited to dizziness, somnolence, fatigue and ataxia.
Topical Retinoid counseling:  Patient advised to apply a pea-sized amount only at bedtime and wait 30 minutes after washing their face before applying.  If too drying, patient may add a non-comedogenic moisturizer. The patient verbalized understanding of the proper use and possible adverse effects of retinoids.  All of the patient's questions and concerns were addressed.
Xolair Pregnancy And Lactation Text: This medication is Pregnancy Category B and is considered safe during pregnancy. This medication is excreted in breast milk.
Carac Counseling:  I discussed with the patient the risks of Carac including but not limited to erythema, scaling, itching, weeping, crusting, and pain.
Minoxidil Counseling: Minoxidil is a topical medication which can increase blood flow where it is applied. It is uncertain how this medication increases hair growth. Side effects are uncommon and include stinging and allergic reactions.
Valtrex Pregnancy And Lactation Text: this medication is Pregnancy Category B and is considered safe during pregnancy. This medication is not directly found in breast milk but it's metabolite acyclovir is present.
Azathioprine Counseling:  I discussed with the patient the risks of azathioprine including but not limited to myelosuppression, immunosuppression, hepatotoxicity, lymphoma, and infections.  The patient understands that monitoring is required including baseline LFTs, Creatinine, possible TPMP genotyping and weekly CBCs for the first month and then every 2 weeks thereafter.  The patient verbalized understanding of the proper use and possible adverse effects of azathioprine.  All of the patient's questions and concerns were addressed.
Topical Steroids Counseling: I discussed with the patient that prolonged use of topical steroids can result in the increased appearance of superficial blood vessels (telangiectasias), lightening (hypopigmentation) and thinning of the skin (atrophy).  Patient understands to avoid using high potency steroids in skin folds, the groin or the face.  The patient verbalized understanding of the proper use and possible adverse effects of topical steroids.  All of the patient's questions and concerns were addressed.
Protopic Pregnancy And Lactation Text: This medication is Pregnancy Category C. It is unknown if this medication is excreted in breast milk when applied topically.
Winlevi Pregnancy And Lactation Text: This medication is considered safe during pregnancy and breastfeeding.
Nsaids Counseling: NSAID Counseling: I discussed with the patient that NSAIDs should be taken with food. Prolonged use of NSAIDs can result in the development of stomach ulcers.  Patient advised to stop taking NSAIDs if abdominal pain occurs.  The patient verbalized understanding of the proper use and possible adverse effects of NSAIDs.  All of the patient's questions and concerns were addressed.
Erythromycin Counseling:  I discussed with the patient the risks of erythromycin including but not limited to GI upset, allergic reaction, drug rash, diarrhea, increase in liver enzymes, and yeast infections.
Dutasteride Female Counseling: Dutasteride Counseling:  I discussed with the patient the risks of use of dutasteride including but not limited to decreased libido and sexual dysfunction. Explained the teratogenic nature of the medication and stressed the importance of not getting pregnant during treatment. All of the patient's questions and concerns were addressed.
Qbrexza Counseling:  I discussed with the patient the risks of Qbrexza including but not limited to headache, mydriasis, blurred vision, dry eyes, nasal dryness, dry mouth, dry throat, dry skin, urinary hesitation, and constipation.  Local skin reactions including erythema, burning, stinging, and itching can also occur.
Dutasteride Pregnancy And Lactation Text: This medication is absolutely contraindicated in women, especially during pregnancy and breast feeding. Feminization of male fetuses is possible if taking while pregnant.
Topical Steroids Applications Pregnancy And Lactation Text: Most topical steroids are considered safe to use during pregnancy and lactation.  Any topical steroid applied to the breast or nipple should be washed off before breastfeeding.
Hydroxyzine Counseling: Patient advised that the medication is sedating and not to drive a car after taking this medication.  Patient informed of potential adverse effects including but not limited to dry mouth, urinary retention, and blurry vision.  The patient verbalized understanding of the proper use and possible adverse effects of hydroxyzine.  All of the patient's questions and concerns were addressed.
Spevigo Counseling: I discussed with the patient the risks of Spevigo including but not limited to fatigue, nasuea, vomiting, headache, pruritus, urinary tract infection, an infusion related reactions.  The patient understands that monitoring is required including screening for tuberculosis at baseline and yearly screening thereafter while continuing Spevigo therapy. They should contact us if symptoms of infection or other concerning signs are noted.
Bimzelx Counseling:  I discussed with the patient the risks of Bimzelx including but not limited to depression, immunosuppression, allergic reactions and infections.  The patient understands that monitoring is required including a PPD at baseline and must alert us or the primary physician if symptoms of infection or other concerning signs are noted.
Propranolol Counseling:  I discussed with the patient the risks of propranolol including but not limited to low heart rate, low blood pressure, low blood sugar, restlessness and increased cold sensitivity. They should call the office if they experience any of these side effects.
High Dose Vitamin A Counseling: Side effects reviewed, pt to contact office should one occur.
Prednisone Counseling:  I discussed with the patient the risks of prolonged use of prednisone including but not limited to weight gain, insomnia, osteoporosis, mood changes, diabetes, susceptibility to infection, glaucoma and high blood pressure.  In cases where prednisone use is prolonged, patients should be monitored with blood pressure checks, serum glucose levels and an eye exam.  Additionally, the patient may need to be placed on GI prophylaxis, PCP prophylaxis, and calcium and vitamin D supplementation and/or a bisphosphonate.  The patient verbalized understanding of the proper use and the possible adverse effects of prednisone.  All of the patient's questions and concerns were addressed.
Arava Counseling:  Patient counseled regarding adverse effects of Arava including but not limited to nausea, vomiting, abnormalities in liver function tests. Patients may develop mouth sores, rash, diarrhea, and abnormalities in blood counts. The patient understands that monitoring is required including LFTs and blood counts.  There is a rare possibility of scarring of the liver and lung problems that can occur when taking methotrexate. Persistent nausea, loss of appetite, pale stools, dark urine, cough, and shortness of breath should be reported immediately. Patient advised to discontinue Arava treatment and consult with a physician prior to attempting conception. The patient will have to undergo a treatment to eliminate Arava from the body prior to conception.
Rinvoq Pregnancy And Lactation Text: Based on animal studies, Rinvoq may cause embryo-fetal harm when administered to pregnant women.  The medication should not be used in pregnancy.  Breastfeeding is not recommended during treatment and for 6 days after the last dose.
Sarecycline Counseling: Patient advised regarding possible photosensitivity and discoloration of the teeth, skin, lips, tongue and gums.  Patient instructed to avoid sunlight, if possible.  When exposed to sunlight, patients should wear protective clothing, sunglasses, and sunscreen.  The patient was instructed to call the office immediately if the following severe adverse effects occur:  hearing changes, easy bruising/bleeding, severe headache, or vision changes.  The patient verbalized understanding of the proper use and possible adverse effects of sarecycline.  All of the patient's questions and concerns were addressed.
Glycopyrrolate Counseling:  I discussed with the patient the risks of glycopyrrolate including but not limited to skin rash, drowsiness, dry mouth, difficulty urinating, and blurred vision.
Bactrim Counseling:  I discussed with the patient the risks of sulfa antibiotics including but not limited to GI upset, allergic reaction, drug rash, diarrhea, dizziness, photosensitivity, and yeast infections.  Rarely, more serious reactions can occur including but not limited to aplastic anemia, agranulocytosis, methemoglobinemia, blood dyscrasias, liver or kidney failure, lung infiltrates or desquamative/blistering drug rashes.
High Dose Vitamin A Pregnancy And Lactation Text: High dose vitamin A therapy is contraindicated during pregnancy and breast feeding.
Bimzelx Pregnancy And Lactation Text: This medication crosses the placenta and the safety is uncertain during pregnancy. It is unknown if this medication is present in breast milk.
Tazorac Counseling:  Patient advised that medication is irritating and drying.  Patient may need to apply sparingly and wash off after an hour before eventually leaving it on overnight.  The patient verbalized understanding of the proper use and possible adverse effects of tazorac.  All of the patient's questions and concerns were addressed.
Erythromycin Pregnancy And Lactation Text: This medication is Pregnancy Category B and is considered safe during pregnancy. It is also excreted in breast milk.
VTAMA Counseling: I discussed with the patient that VTAMA is not for use in the eyes, mouth or mouth. They should call the office if they develop any signs of allergic reactions to VTAMA. The patient verbalized understanding of the proper use and possible adverse effects of VTAMA.  All of the patient's questions and concerns were addressed.
Nsaids Pregnancy And Lactation Text: These medications are considered safe up to 30 weeks gestation. It is excreted in breast milk.
Humira Counseling:  I discussed with the patient the risks of adalimumab including but not limited to myelosuppression, immunosuppression, autoimmune hepatitis, demyelinating diseases, lymphoma, and serious infections.  The patient understands that monitoring is required including a PPD at baseline and must alert us or the primary physician if symptoms of infection or other concerning signs are noted.
Metronidazole Counseling:  I discussed with the patient the risks of metronidazole including but not limited to seizures, nausea/vomiting, a metallic taste in the mouth, nausea/vomiting and severe allergy.
Hydroxyzine Pregnancy And Lactation Text: This medication is not safe during pregnancy and should not be taken. It is also excreted in breast milk and breast feeding isn't recommended.
Rituxan Counseling:  I discussed with the patient the risks of Rituxan infusions. Side effects can include infusion reactions, severe drug rashes including mucocutaneous reactions, reactivation of latent hepatitis and other infections and rarely progressive multifocal leukoencephalopathy.  All of the patient's questions and concerns were addressed.
Finasteride Male Counseling: Finasteride Counseling:  I discussed with the patient the risks of use of finasteride including but not limited to decreased libido, decreased ejaculate volume, gynecomastia, and depression. Women should not handle medication.  All of the patient's questions and concerns were addressed.
Topical Sulfur Applications Counseling: Topical Sulfur Counseling: Patient counseled that this medication may cause skin irritation or allergic reactions.  In the event of skin irritation, the patient was advised to reduce the amount of the drug applied or use it less frequently.   The patient verbalized understanding of the proper use and possible adverse effects of topical sulfur application.  All of the patient's questions and concerns were addressed.
Olanzapine Counseling- I discussed with the patient the common side effects of olanzapine including but are not limited to: lack of energy, dry mouth, increased appetite, sleepiness, tremor, constipation, dizziness, changes in behavior, or restlessness.  Explained that teenagers are more likely to experience headaches, abdominal pain, pain in the arms or legs, tiredness, and sleepiness.  Serious side effects include but are not limited: increased risk of death in elderly patients who are confused, have memory loss, or dementia-related psychosis; hyperglycemia; increased cholesterol and triglycerides; and weight gain.
Calcipotriene Counseling:  I discussed with the patient the risks of calcipotriene including but not limited to erythema, scaling, itching, and irritation.
Cellcept Counseling:  I discussed with the patient the risks of mycophenolate mofetil including but not limited to infection/immunosuppression, GI upset, hypokalemia, hypercholesterolemia, bone marrow suppression, lymphoproliferative disorders, malignancy, GI ulceration/bleed/perforation, colitis, interstitial lung disease, kidney failure, progressive multifocal leukoencephalopathy, and birth defects.  The patient understands that monitoring is required including a baseline creatinine and regular CBC testing. In addition, patient must alert us immediately if symptoms of infection or other concerning signs are noted.
Spevigo Pregnancy And Lactation Text: The risk during pregnancy and breastfeeding is uncertain with this medication. This medication does cross the placenta. It is unknown if this medication is found in breast milk.
Eucrisa Counseling: Patient may experience a mild burning sensation during topical application. Eucrisa is not approved in children less than 2 years of age.
Erivedge Counseling- I discussed with the patient the risks of Erivedge including but not limited to nausea, vomiting, diarrhea, constipation, weight loss, changes in the sense of taste, decreased appetite, muscle spasms, and hair loss.  The patient verbalized understanding of the proper use and possible adverse effects of Erivedge.  All of the patient's questions and concerns were addressed.
Azithromycin Counseling:  I discussed with the patient the risks of azithromycin including but not limited to GI upset, allergic reaction, drug rash, diarrhea, and yeast infections.
Sotyktu Counseling:  I discussed the most common side effects of Sotyktu including: common cold, sore throat, sinus infections, cold sores, canker sores, folliculitis, and acne.? I also discussed more serious side effects of Sotyktu including but not limited to: serious allergic reactions; increased risk for infections such as TB; cancers such as lymphomas; rhabdomyolysis and elevated CPK; and elevated triglycerides and liver enzymes.?
Ketoconazole Counseling:   Patient counseled regarding improving absorption with orange juice.  Adverse effects include but are not limited to breast enlargement, headache, diarrhea, nausea, upset stomach, liver function test abnormalities, taste disturbance, and stomach pain.  There is a rare possibility of liver failure that can occur when taking ketoconazole. The patient understands that monitoring of LFTs may be required, especially at baseline. The patient verbalized understanding of the proper use and possible adverse effects of ketoconazole.  All of the patient's questions and concerns were addressed.
Propranolol Pregnancy And Lactation Text: This medication is Pregnancy Category C and it isn't known if it is safe during pregnancy. It is excreted in breast milk.
Qbrexza Pregnancy And Lactation Text: There is no available data on Qbrexza use in pregnant women.  There is no available data on Qbrexza use in lactation.

## 2024-08-16 ENCOUNTER — PATIENT MESSAGE (OUTPATIENT)
Dept: PHYSICAL MEDICINE AND REHAB | Facility: MEDICAL CENTER | Age: 59
End: 2024-08-16
Payer: COMMERCIAL

## 2024-08-19 ENCOUNTER — HOSPITAL ENCOUNTER (OUTPATIENT)
Dept: RADIOLOGY | Facility: MEDICAL CENTER | Age: 59
End: 2024-08-19
Attending: NURSE PRACTITIONER

## 2024-08-19 ENCOUNTER — OFFICE VISIT (OUTPATIENT)
Dept: PHYSICAL MEDICINE AND REHAB | Facility: MEDICAL CENTER | Age: 59
End: 2024-08-19
Payer: COMMERCIAL

## 2024-08-19 VITALS
DIASTOLIC BLOOD PRESSURE: 94 MMHG | TEMPERATURE: 97 F | HEART RATE: 87 BPM | BODY MASS INDEX: 31.45 KG/M2 | OXYGEN SATURATION: 94 % | WEIGHT: 232.2 LBS | HEIGHT: 72 IN | SYSTOLIC BLOOD PRESSURE: 135 MMHG

## 2024-08-19 DIAGNOSIS — M54.16 LUMBAR RADICULOPATHY: ICD-10-CM

## 2024-08-19 DIAGNOSIS — Z71.82 EXERCISE COUNSELING: ICD-10-CM

## 2024-08-19 DIAGNOSIS — E66.9 OBESITY (BMI 30-39.9): ICD-10-CM

## 2024-08-19 DIAGNOSIS — M48.061 SPINAL STENOSIS OF LUMBAR REGION, UNSPECIFIED WHETHER NEUROGENIC CLAUDICATION PRESENT: ICD-10-CM

## 2024-08-19 DIAGNOSIS — Z98.890 HISTORY OF LUMBOSACRAL SPINE SURGERY: ICD-10-CM

## 2024-08-19 PROCEDURE — 3075F SYST BP GE 130 - 139MM HG: CPT | Performed by: PHYSICAL MEDICINE & REHABILITATION

## 2024-08-19 PROCEDURE — 99214 OFFICE O/P EST MOD 30 MIN: CPT | Performed by: PHYSICAL MEDICINE & REHABILITATION

## 2024-08-19 PROCEDURE — 1125F AMNT PAIN NOTED PAIN PRSNT: CPT | Performed by: PHYSICAL MEDICINE & REHABILITATION

## 2024-08-19 PROCEDURE — 3080F DIAST BP >= 90 MM HG: CPT | Performed by: PHYSICAL MEDICINE & REHABILITATION

## 2024-08-19 PROCEDURE — G2211 COMPLEX E/M VISIT ADD ON: HCPCS | Performed by: PHYSICAL MEDICINE & REHABILITATION

## 2024-08-19 RX ORDER — LIDOCAINE 50 MG/G
PATCH TOPICAL
COMMUNITY
Start: 2024-05-31 | End: 2025-06-01

## 2024-08-19 RX ORDER — TESTOSTERONE CYPIONATE 200 MG/ML
INJECTION, SOLUTION INTRAMUSCULAR
COMMUNITY
Start: 2024-08-08

## 2024-08-19 RX ORDER — METHOCARBAMOL 750 MG/1
TABLET, FILM COATED ORAL
COMMUNITY
Start: 2024-05-31

## 2024-08-19 RX ORDER — CLINDAMYCIN PHOSPHATE 11.9 MG/ML
SOLUTION TOPICAL
COMMUNITY
Start: 2024-04-19 | End: 2025-04-20

## 2024-08-19 RX ORDER — DOXYCYCLINE 100 MG/1
CAPSULE ORAL
COMMUNITY
Start: 2023-11-01

## 2024-08-19 ASSESSMENT — PATIENT HEALTH QUESTIONNAIRE - PHQ9: CLINICAL INTERPRETATION OF PHQ2 SCORE: 0

## 2024-08-19 ASSESSMENT — FIBROSIS 4 INDEX: FIB4 SCORE: 1.13

## 2024-08-19 ASSESSMENT — PAIN SCALES - GENERAL: PAINLEVEL: 6=MODERATE PAIN

## 2024-08-19 NOTE — PROGRESS NOTES
Follow up patient note  Interventional spine and Pain  Physiatry (physical medicine and  Rehabilitation)       Chief complaint:   Chief Complaint   Patient presents with    Follow-Up     Back pain          HISTORY    Please see new patient note by Dr Larson,  for more details.     HPI  Patient identification: Zachery Esquivel ,  1965,   With Diagnoses of Lumbar radiculopathy, Spinal stenosis of lumbar region, unspecified whether neurogenic claudication present, and History of lumbosacral spine surgery L4-5 fusion done by Dr Barrie Espinoza were pertinent to this visit.       Procedures:  10/12/2020 bilateral L3-4 transforaminal epidural steroid injection with 80% pain relief post procedure   2021 bilateral L3-4 transforaminal epidural steroid injection 90% improvement for 1 year     Medications tried oxycodone, Zanaflex, Flexeril, Medrol Dosepak, diclofenac, Robaxin      Verbal consent was obtained for Tonny copilot : Yes      History of Present Illness  The patient is a 58-year-old male here for a follow-up visit.    He reports that the relief from his last epidural procedure in  has persisted until now. However, he experiences occasional spasms and pain, which have recently become constant. The pain, described as sharp, sudden, and intense, is rated an 8 out of 10. It typically lasts for 5 to 10 seconds before subsiding slightly but remains painful enough to necessitate sitting. This has been ongoing for 2 to 3 months and seems to be worsening. Methocarbamol provides some temporary relief, but not enough for him to sleep comfortably.  The pain radiates to the bilateral buttocks and occasionally to the bilateral thighs.    His mobility is limited; he can stand for only a minute before needing to sit or move, and walking is possible for about 20 to 30 minutes before stiffness sets in. He finds it difficult to stand on hard surfaces like concrete floors for more than 3 to 4 minutes. When the pain  occurs, it is severe and debilitating, often causing him to kneel or stop walking.    He is currently undergoing physical therapy for unrelated neck and shoulder issues. He also reports numbness in his fingers, which he believes may be related to his neck condition. [facility]. he wishes to focus on his lumbar spine for this visit and consider discussing the cervical spine at the next visit.                ROS Red Flags :   Fever, Chills, Sweats: Denies  Involuntary Weight Loss: Denies  Bowel/Bladder Incontinence: Denies  Saddle Anesthesia: Denies        PMHx:   Past Medical History:   Diagnosis Date    Low back pain     S/P laparoscopic fundoplication        PSHx:   Past Surgical History:   Procedure Laterality Date    MI NJX AA&/STRD TFRML EPI LUMBAR/SACRAL 1 LEVEL Bilateral 6/21/2021    Procedure: BILATERAL L3-4 transforaminal epidural steroid injection with fluoroscopic guidance;  Surgeon: Jack Larson M.D.;  [facility];  Service: Pain Management    MI NJX AA&/STRD TFRML EPI LUMBAR/SACRAL 1 LEVEL Bilateral 10/12/2020    Procedure: INJECTION, SPINE, LUMBOSACRAL, EPIDURAL, 1 LEVEL, TRANSFORAMINAL APPROACH;  Surgeon: Jack Larson M.D.;  Location[facility];  Service: Pain Management    ABDOMINAL EXPLORATION      APPENDECTOMY      CHOLECYSTECTOMY      LUMBAR LAMINECTOMY DISKECTOMY      l4-5 with fusion    NISSEN FUNDOPLICATION LAPAROSCOPIC         Family history   Family History   Problem Relation Age of Onset    Diabetes Father     Breast Cancer Sister 35    Diabetes Paternal Grandmother     Heart Disease Neg Hx     Stroke Neg Hx     Hypertension Neg Hx     Hyperlipidemia Neg Hx     Colorectal Cancer Neg Hx          Medications:   Outpatient Medications Marked as Taking for the 8/19/24 encounter (Office Visit) with Jack Larson M.D.   Medication Sig Dispense Refill    methocarbamol (ROBAXIN) 750 MG Tab        "doxycycline (VIBRAMYCIN) 100 MG Cap       lidocaine (LIDODERM) 5 % Patch APPLY 2 PATCHES TO SKIN ONCE A DAY FOR PAIN **APPLY AND PRESS FIRMLY FOR 10-15 SECONDS, KEEP ON FOR 12 HOURS, THEN REMOVE FOR 12 HOURS**      clindamycin (CLEOCIN) 1 % Solution APPLY A SMALL AMOUNT TO AFFECTED AREA(S) TWICE A DAY FOR ROSACEA      testosterone cypionate (DEPO-TESTOSTERONE) 200 MG/ML injection 200 mg ( 1 ml ) intramuscular every 7 days for 90 days      Tretinoin, Facial Wrinkles, 0.05 % Cream Apply  topically.      vitamin D2, Ergocalciferol, (DRISDOL) 1.25 MG (50055 UT) Cap capsule Take 1 Capsule by mouth every 7 days. 12 Capsule 3    NEEDLE, DISP, 25 G 25G X 5/8\" Misc Please dispense 3-month supply, 3 needles x90 days for vitamin B12 injections.  Thank you 3 Each 3    Continuous Blood Gluc  (FREESTYLE KAILYN 14 DAY READER) Device 1 APPLICATION. EVERY 14 DAYS. 3 Each 3    Continuous Blood Gluc Sensor (FREESTYLE KAILYN 14 DAY SENSOR) Misc 1 Application. every 14 days. 4 Each 3    Continuous Blood Gluc  (FREESTYLE KAILYN 2 READER) Device 3 mos supply please 1 Each 1    Continuous Blood Gluc Sensor (FREESTYLE KAILYN 2 SENSOR) Misc 3 mos supply please 1 Each 1        Current Outpatient Medications on File Prior to Visit   Medication Sig Dispense Refill    methocarbamol (ROBAXIN) 750 MG Tab       doxycycline (VIBRAMYCIN) 100 MG Cap       lidocaine (LIDODERM) 5 % Patch APPLY 2 PATCHES TO SKIN ONCE A DAY FOR PAIN **APPLY AND PRESS FIRMLY FOR 10-15 SECONDS, KEEP ON FOR 12 HOURS, THEN REMOVE FOR 12 HOURS**      clindamycin (CLEOCIN) 1 % Solution APPLY A SMALL AMOUNT TO AFFECTED AREA(S) TWICE A DAY FOR ROSACEA      testosterone cypionate (DEPO-TESTOSTERONE) 200 MG/ML injection 200 mg ( 1 ml ) intramuscular every 7 days for 90 days      Tretinoin, Facial Wrinkles, 0.05 % Cream Apply  topically.      vitamin D2, Ergocalciferol, (DRISDOL) 1.25 MG (68843 UT) Cap capsule Take 1 Capsule by mouth every 7 days. 12 Capsule 3    NEEDLE, " "DISP, 25 G 25G X 5/8\" Misc Please dispense 3-month supply, 3 needles x90 days for vitamin B12 injections.  Thank you 3 Each 3    Continuous Blood Gluc  (FREESTYLE KAILYN 14 DAY READER) Device 1 APPLICATION. EVERY 14 DAYS. 3 Each 3    Continuous Blood Gluc Sensor (FREESTYLE KAILYN 14 DAY SENSOR) Misc 1 Application. every 14 days. 4 Each 3    Continuous Blood Gluc  (FREESTYLE KAILYN 2 READER) Device 3 mos supply please 1 Each 1    Continuous Blood Gluc Sensor (FREESTYLE KAILYN 2 SENSOR) Misc 3 mos supply please 1 Each 1    fluticasone (FLONASE) 50 MCG/ACT nasal spray Administer 1 Spray into affected nostril(S) every day.       No current facility-administered medications on file prior to visit.         Allergies:   Allergies   Allergen Reactions    Penicillins Rash     .    Tylenol      Can't take due to elevated liver enzymes    Bactrim [Sulfamethoxazole W-Trimethoprim]      Itching rash palms and soles       Social Hx:   Social History     Socioeconomic History    Marital status:      Spouse name: Not on file    Number of children: Not on file    Years of education: Not on file    Highest education level: Associate degree: academic program   Occupational History    Not on file   Tobacco Use    Smoking status: Never    Smokeless tobacco: Never   Vaping Use    Vaping status: Never Used   Substance and Sexual Activity    Alcohol use: Yes     Alcohol/week: 1.0 oz     Types: 2 Glasses of wine per week     Comment: Occasionally    Drug use: No    Sexual activity: Yes     Partners: Female   Other Topics Concern     Service No    Blood Transfusions Yes    Caffeine Concern No    Occupational Exposure No    Hobby Hazards No    Sleep Concern Yes    Stress Concern Yes    Weight Concern No    Special Diet No    Back Care Yes     Comment: back brace    Exercise Yes    Bike Helmet No    Seat Belt Yes    Self-Exams No   Social History Narrative    Not on file     Social Determinants of Health "     Financial Resource Strain: Low Risk  (4/9/2023)    Overall Financial Resource Strain (CARDIA)     Difficulty of Paying Living Expenses: Not hard at all   Food Insecurity: No Food Insecurity (4/9/2023)    Hunger Vital Sign     Worried About Running Out of Food in the Last Year: Never true     Ran Out of Food in the Last Year: Never true   Transportation Needs: No Transportation Needs (4/9/2023)    PRAPARE - Transportation     Lack of Transportation (Medical): No     Lack of Transportation (Non-Medical): No   Physical Activity: Insufficiently Active (4/9/2023)    Exercise Vital Sign     Days of Exercise per Week: 2 days     Minutes of Exercise per Session: 30 min   Stress: No Stress Concern Present (4/9/2023)    [place] of Occupational [employer] - Occupational Stress Questionnaire     Feeling of Stress : Only a little   Social Connections: Socially Integrated (4/9/2023)    Social Connection and Isolation Panel [NHANES]     Frequency of Communication with Friends and Family: Once a week     Frequency of Social Gatherings with Friends and Family: Twice a week     Attends [employer] Services: 1 to 4 times per year     Active Member of Clubs or Organizations: Yes     Attends Club or Organization Meetings: More than 4 times per year     Marital Status:    Intimate Partner Violence: Not on file   Housing Stability: Low Risk  (4/9/2023)    Housing Stability Vital Sign     Unable to Pay for Housing in the Last Year: No     Number of Places Lived in the Last Year: 1     Unstable Housing in the Last Year: No         EXAMINATION     Physical Exam:   Vitals: BP (!) 135/94 (BP Location: Right arm, Patient Position: Sitting, BP Cuff Size: Adult)   Pulse 87   Temp 36.1 °C (97 °F) (Temporal)   Ht 1.829 m (6')   Wt 105 kg (232 lb 3.2 oz)   SpO2 94%     Constitutional:   Body Habitus: Body mass index is 31.49 kg/m².  Cooperation: Fully cooperates with exam  Appearance: Well-groomed no disheveled    Respiratory-   "breathing comfortable on room air, no audible wheezing  Cardiovascular- capillary refills less than 2 seconds. No lower extremity edema is noted.   Psychiatric- alert and oriented ×3. Normal affect.    MSK and Neuro: -    Physical Exam           MEDICAL DECISION MAKING    DATA    Labs:   Lab Results   Component Value Date/Time    SODIUM 140 05/16/2024 11:24 AM    POTASSIUM 4.2 05/16/2024 11:24 AM    CHLORIDE 106 05/16/2024 11:24 AM    CO2 23 05/16/2024 11:24 AM    GLUCOSE 102 (H) 05/16/2024 11:24 AM    BUN 15 05/16/2024 11:24 AM    CREATININE 1.00 05/16/2024 11:24 AM        No results found for: \"PROTHROMBTM\", \"INR\"     Lab Results   Component Value Date/Time    WBC 7.0 05/16/2024 11:24 AM    RBC 5.66 05/16/2024 11:24 AM    HEMOGLOBIN 18.1 (H) 05/16/2024 11:24 AM    HEMATOCRIT 52.0 05/16/2024 11:24 AM    MCV 91.9 05/16/2024 11:24 AM    MCH 32.0 05/16/2024 11:24 AM    MCHC 34.8 05/16/2024 11:24 AM    MPV 10.6 05/16/2024 11:24 AM    NEUTSPOLYS 60.50 06/07/2022 08:39 AM    LYMPHOCYTES 22.90 06/07/2022 08:39 AM    MONOCYTES 10.50 06/07/2022 08:39 AM    EOSINOPHILS 4.90 06/07/2022 08:39 AM    BASOPHILS 0.90 06/07/2022 08:39 AM        Lab Results   Component Value Date/Time    HBA1C 5.2 05/16/2024 11:24 AM          Imaging:   I personally reviewed following images    MRI lumbar spine 5/16/2024  Posterior fusion L4-5.  Left paracentral disc bulge at L5-S1 which is adjacent to the descending nerve roots.  Facet arthropathy bilaterally at L5-S1.  Facet arthropathy bilaterally at L3-4.  There is severe central canal stenosis at L3-4.  There is foraminal stenosis bilaterally at L3-4.    MRI lumbar spine 9/27/2020  At L5-S1 there is a disc protrusion.  This is causing mild to moderate central canal stenosis and moderate bilateral stenosis right worse than left.  History of L4-5 posterior fusion.  At L3-4 there is moderate to severe central canal stenosis and bilateral neuroforaminal stenosis.  Facet arthropathy at L3-4 and " L5-S1.  There are no acute fractures.     I reviewed the fluoroscopic images from 10/12/2020 showing successful bilateral L3-4 transforaminal epidural steroid injection.  I reviewed these with the patient on 11/5/2020.      Results  Imaging  Severe spinal stenosis above the level of the previous fusion.       I reviewed the following radiology reports    MRI lumbar spine 5/16/2024 T12-L3 neuroforamina and spinal canal are patent.  L3-4 small to moderate circumferential disc bulge with bilateral facet arthrosis ligamentum flavum hypertrophy severe narrowing of the spinal canal.  AP thecal sac diameter measures 5 mm.  Neuroforamina are patent.  L4-5 postsurgical changes as described bilateral facet arthrosis.  Neuroforamina and spinal canal are patent  L5-S1 small to moderate circumferential disc bulge with superimposed left paracentral disc protrusion.  Bilateral facet arthrosis.  Mild narrowing of the right neuroforamen.  There is also narrowing of the left lateral recess containing the traversing left S1 nerve root.  Impression  Degenerative and postsurgical changes as described.  Including left lateral recess narrowing at L5-S1 and severe spinal canal stenosis at L3-4.          Results for orders placed during the hospital encounter of 09/27/20    MR-LUMBAR SPINE-W/O    Impression  1.  L3-4 mild-moderate spinal stenosis    2.  L5-S1 left paracentral disc extrusion moderately narrowing the left lateral recess and foramen    3.  L4-5 postoperative changes appear within normal limits                                                                                                                 Results for orders placed during the hospital encounter of 10/15/20    DX-LUMBAR SPINE-4+ VIEWS    Impression  1.  No compression deformity or acute fracture is identified.    2.  Degenerative disc disease and facet arthropathy is most prominent in the lower lumbar spine. Posterior fusion is noted at L4-L5.    3.  No focal  instability is noted on flexion extension views.                        DIAGNOSIS   Visit Diagnoses     ICD-10-CM   1. Lumbar radiculopathy  M54.16   2. Spinal stenosis of lumbar region, unspecified whether neurogenic claudication present  M48.061   3. History of lumbosacral spine surgery L4-5 fusion done by Dr Barrie Espinoza  [id number]         ASSESSMENT and PLAN:     Zachery Esquivel  1965 male      Zachery was seen today for follow-up.    Diagnoses and all orders for this visit:    Lumbar radiculopathy  -     Referral to Physical Medicine Rehab    Spinal stenosis of lumbar region, unspecified whether neurogenic claudication present    History of lumbosacral spine surgery L4-5 fusion done by Dr Barrie Espinoza        Assessment & Plan  Lumbar radiculopathy  His symptoms, including severe pain and spasms, suggest severe spinal stenosis, particularly above the level of the previous fusion. The pain radiating down his arm and numbness in his hand indicate a cervical origin.     A transforaminal epidural steroid injection on both sides at L3-4 is planned, hopefully replicating the previous successful treatment.         Cervical Radiculopathy.  The numbness in his fingers and pain radiating down the arm are consistent with cervical radiculopathy. An MRI of the neck area is recommended to assess the severity and exact location of the nerve compression. If the MRI confirms significant findings, an epidural steroid injection in the cervical region may be considered.    Medication Management.:    He is currently on methocarbamol for muscle spasms, which provides temporary relief. Continuation of methocarbamol is advised for symptomatic relief.          Follow up: After the above procedure    Thank you for allowing me to participate in the care of this patient. If you have any questions please not hesitate to contact me.             Please note that this dictation was created using voice recognition software. I have made  every reasonable attempt to correct obvious errors but there may be errors of grammar and content that I may have overlooked prior to finalization of this note.      Jack Larson MD  Interventional Spine and Sports Physiatry  Physical Medicine and Rehabilitation  [facility]

## 2024-08-19 NOTE — H&P (VIEW-ONLY)
Follow up patient note  Interventional spine and Pain  Physiatry (physical medicine and  Rehabilitation)       Chief complaint:   Chief Complaint   Patient presents with    Follow-Up     Back pain          HISTORY    Please see new patient note by Dr Larson,  for more details.     HPI  Patient identification: Zachery Esquivel ,  1965,   With Diagnoses of Lumbar radiculopathy, Spinal stenosis of lumbar region, unspecified whether neurogenic claudication present, and History of lumbosacral spine surgery L4-5 fusion done by Dr Barrie Espinoza were pertinent to this visit.       Procedures:  10/12/2020 bilateral L3-4 transforaminal epidural steroid injection with 80% pain relief post procedure   2021 bilateral L3-4 transforaminal epidural steroid injection 90% improvement for 1 year     Medications tried oxycodone, Zanaflex, Flexeril, Medrol Dosepak, diclofenac, Robaxin      Verbal consent was obtained for Tonny copilot : Yes      History of Present Illness  The patient is a 58-year-old male here for a follow-up visit.    He reports that the relief from his last epidural procedure in  has persisted until now. However, he experiences occasional spasms and pain, which have recently become constant. The pain, described as sharp, sudden, and intense, is rated an 8 out of 10. It typically lasts for 5 to 10 seconds before subsiding slightly but remains painful enough to necessitate sitting. This has been ongoing for 2 to 3 months and seems to be worsening. Methocarbamol provides some temporary relief, but not enough for him to sleep comfortably.  The pain radiates to the bilateral buttocks and occasionally to the bilateral thighs.    His mobility is limited; he can stand for only a minute before needing to sit or move, and walking is possible for about 20 to 30 minutes before stiffness sets in. He finds it difficult to stand on hard surfaces like concrete floors for more than 3 to 4 minutes. When the pain  occurs, it is severe and debilitating, often causing him to kneel or stop walking.    He is currently undergoing physical therapy for unrelated neck and shoulder issues. He also reports numbness in his fingers, which he believes may be related to his neck condition. [facility]. he wishes to focus on his lumbar spine for this visit and consider discussing the cervical spine at the next visit.                ROS Red Flags :   Fever, Chills, Sweats: Denies  Involuntary Weight Loss: Denies  Bowel/Bladder Incontinence: Denies  Saddle Anesthesia: Denies        PMHx:   Past Medical History:   Diagnosis Date    Low back pain     S/P laparoscopic fundoplication        PSHx:   Past Surgical History:   Procedure Laterality Date    MD NJX AA&/STRD TFRML EPI LUMBAR/SACRAL 1 LEVEL Bilateral 6/21/2021    Procedure: BILATERAL L3-4 transforaminal epidural steroid injection with fluoroscopic guidance;  Surgeon: Jack Larson M.D.;  [facility];  Service: Pain Management    MD NJX AA&/STRD TFRML EPI LUMBAR/SACRAL 1 LEVEL Bilateral 10/12/2020    Procedure: INJECTION, SPINE, LUMBOSACRAL, EPIDURAL, 1 LEVEL, TRANSFORAMINAL APPROACH;  Surgeon: Jack Larson M.D.;  Location: [facility];  Service: Pain Management    ABDOMINAL EXPLORATION      APPENDECTOMY      CHOLECYSTECTOMY      LUMBAR LAMINECTOMY DISKECTOMY      l4-5 with fusion    NISSEN FUNDOPLICATION LAPAROSCOPIC         Family history   Family History   Problem Relation Age of Onset    Diabetes Father     Breast Cancer Sister 35    Diabetes Paternal Grandmother     Heart Disease Neg Hx     Stroke Neg Hx     Hypertension Neg Hx     Hyperlipidemia Neg Hx     Colorectal Cancer Neg Hx          Medications:   Outpatient Medications Marked as Taking for the 8/19/24 encounter (Office Visit) with Jack Larson M.D.   Medication Sig Dispense Refill    methocarbamol (ROBAXIN) 750 MG Tab        "doxycycline (VIBRAMYCIN) 100 MG Cap       lidocaine (LIDODERM) 5 % Patch APPLY 2 PATCHES TO SKIN ONCE A DAY FOR PAIN **APPLY AND PRESS FIRMLY FOR 10-15 SECONDS, KEEP ON FOR 12 HOURS, THEN REMOVE FOR 12 HOURS**      clindamycin (CLEOCIN) 1 % Solution APPLY A SMALL AMOUNT TO AFFECTED AREA(S) TWICE A DAY FOR ROSACEA      testosterone cypionate (DEPO-TESTOSTERONE) 200 MG/ML injection 200 mg ( 1 ml ) intramuscular every 7 days for 90 days      Tretinoin, Facial Wrinkles, 0.05 % Cream Apply  topically.      vitamin D2, Ergocalciferol, (DRISDOL) 1.25 MG (59203 UT) Cap capsule Take 1 Capsule by mouth every 7 days. 12 Capsule 3    NEEDLE, DISP, 25 G 25G X 5/8\" Misc Please dispense 3-month supply, 3 needles x90 days for vitamin B12 injections.  Thank you 3 Each 3    Continuous Blood Gluc  (FREESTYLE KAILYN 14 DAY READER) Device 1 APPLICATION. EVERY 14 DAYS. 3 Each 3    Continuous Blood Gluc Sensor (FREESTYLE KAILYN 14 DAY SENSOR) Misc 1 Application. every 14 days. 4 Each 3    Continuous Blood Gluc  (FREESTYLE KAILYN 2 READER) Device 3 mos supply please 1 Each 1    Continuous Blood Gluc Sensor (FREESTYLE KAILYN 2 SENSOR) Misc 3 mos supply please 1 Each 1        Current Outpatient Medications on File Prior to Visit   Medication Sig Dispense Refill    methocarbamol (ROBAXIN) 750 MG Tab       doxycycline (VIBRAMYCIN) 100 MG Cap       lidocaine (LIDODERM) 5 % Patch APPLY 2 PATCHES TO SKIN ONCE A DAY FOR PAIN **APPLY AND PRESS FIRMLY FOR 10-15 SECONDS, KEEP ON FOR 12 HOURS, THEN REMOVE FOR 12 HOURS**      clindamycin (CLEOCIN) 1 % Solution APPLY A SMALL AMOUNT TO AFFECTED AREA(S) TWICE A DAY FOR ROSACEA      testosterone cypionate (DEPO-TESTOSTERONE) 200 MG/ML injection 200 mg ( 1 ml ) intramuscular every 7 days for 90 days      Tretinoin, Facial Wrinkles, 0.05 % Cream Apply  topically.      vitamin D2, Ergocalciferol, (DRISDOL) 1.25 MG (38850 UT) Cap capsule Take 1 Capsule by mouth every 7 days. 12 Capsule 3    NEEDLE, " "DISP, 25 G 25G X 5/8\" Misc Please dispense 3-month supply, 3 needles x90 days for vitamin B12 injections.  Thank you 3 Each 3    Continuous Blood Gluc  (FREESTYLE KAILYN 14 DAY READER) Device 1 APPLICATION. EVERY 14 DAYS. 3 Each 3    Continuous Blood Gluc Sensor (FREESTYLE KAILYN 14 DAY SENSOR) Misc 1 Application. every 14 days. 4 Each 3    Continuous Blood Gluc  (FREESTYLE KAILYN 2 READER) Device 3 mos supply please 1 Each 1    Continuous Blood Gluc Sensor (FREESTYLE KAILYN 2 SENSOR) Misc 3 mos supply please 1 Each 1    fluticasone (FLONASE) 50 MCG/ACT nasal spray Administer 1 Spray into affected nostril(S) every day.       No current facility-administered medications on file prior to visit.         Allergies:   Allergies   Allergen Reactions    Penicillins Rash     .    Tylenol      Can't take due to elevated liver enzymes    Bactrim [Sulfamethoxazole W-Trimethoprim]      Itching rash palms and soles       Social Hx:   Social History     Socioeconomic History    Marital status:      Spouse name: Not on file    Number of children: Not on file    Years of education: Not on file    Highest education level: Associate degree: academic program   Occupational History    Not on file   Tobacco Use    Smoking status: Never    Smokeless tobacco: Never   Vaping Use    Vaping status: Never Used   Substance and Sexual Activity    Alcohol use: Yes     Alcohol/week: 1.0 oz     Types: 2 Glasses of wine per week     Comment: Occasionally    Drug use: No    Sexual activity: Yes     Partners: Female   Other Topics Concern     Service No    Blood Transfusions Yes    Caffeine Concern No    Occupational Exposure No    Hobby Hazards No    Sleep Concern Yes    Stress Concern Yes    Weight Concern No    Special Diet No    Back Care Yes     Comment: back brace    Exercise Yes    Bike Helmet No    Seat Belt Yes    Self-Exams No   Social History Narrative    Not on file     Social Determinants of Health "     Financial Resource Strain: Low Risk  (4/9/2023)    Overall Financial Resource Strain (CARDIA)     Difficulty of Paying Living Expenses: Not hard at all   Food Insecurity: No Food Insecurity (4/9/2023)    Hunger Vital Sign     Worried About Running Out of Food in the Last Year: Never true     Ran Out of Food in the Last Year: Never true   Transportation Needs: No Transportation Needs (4/9/2023)    PRAPARE - Transportation     Lack of Transportation (Medical): No     Lack of Transportation (Non-Medical): No   Physical Activity: Insufficiently Active (4/9/2023)    Exercise Vital Sign     Days of Exercise per Week: 2 days     Minutes of Exercise per Session: 30 min   Stress: No Stress Concern Present (4/9/2023)    [place] Given of Occupational Health - Occupational Stress Questionnaire     Feeling of Stress : Only a little   Social Connections: Socially Integrated (4/9/2023)    Social Connection and Isolation Panel [NHANES]     Frequency of Communication with Friends and Family: Once a week     Frequency of Social Gatherings with Friends and Family: Twice a week     Attends [employer] Services: 1 to 4 times per year     Active Member of Clubs or Organizations: Yes     Attends Club or Organization Meetings: More than 4 times per year     Marital Status:    Intimate Partner Violence: Not on file   Housing Stability: Low Risk  (4/9/2023)    Housing Stability Vital Sign     Unable to Pay for Housing in the Last Year: No     Number of Places Lived in the Last Year: 1     Unstable Housing in the Last Year: No         EXAMINATION     Physical Exam:   Vitals: BP (!) 135/94 (BP Location: Right arm, Patient Position: Sitting, BP Cuff Size: Adult)   Pulse 87   Temp 36.1 °C (97 °F) (Temporal)   Ht 1.829 m (6')   Wt 105 kg (232 lb 3.2 oz)   SpO2 94%     Constitutional:   Body Habitus: Body mass index is 31.49 kg/m².  Cooperation: Fully cooperates with exam  Appearance: Well-groomed no disheveled    Respiratory-   "breathing comfortable on room air, no audible wheezing  Cardiovascular- capillary refills less than 2 seconds. No lower extremity edema is noted.   Psychiatric- alert and oriented ×3. Normal affect.    MSK and Neuro: -    Physical Exam           MEDICAL DECISION MAKING    DATA    Labs:   Lab Results   Component Value Date/Time    SODIUM 140 05/16/2024 11:24 AM    POTASSIUM 4.2 05/16/2024 11:24 AM    CHLORIDE 106 05/16/2024 11:24 AM    CO2 23 05/16/2024 11:24 AM    GLUCOSE 102 (H) 05/16/2024 11:24 AM    BUN 15 05/16/2024 11:24 AM    CREATININE 1.00 05/16/2024 11:24 AM        No results found for: \"PROTHROMBTM\", \"INR\"     Lab Results   Component Value Date/Time    WBC 7.0 05/16/2024 11:24 AM    RBC 5.66 05/16/2024 11:24 AM    HEMOGLOBIN 18.1 (H) 05/16/2024 11:24 AM    HEMATOCRIT 52.0 05/16/2024 11:24 AM    MCV 91.9 05/16/2024 11:24 AM    MCH 32.0 05/16/2024 11:24 AM    MCHC 34.8 05/16/2024 11:24 AM    MPV 10.6 05/16/2024 11:24 AM    NEUTSPOLYS 60.50 06/07/2022 08:39 AM    LYMPHOCYTES 22.90 06/07/2022 08:39 AM    MONOCYTES 10.50 06/07/2022 08:39 AM    EOSINOPHILS 4.90 06/07/2022 08:39 AM    BASOPHILS 0.90 06/07/2022 08:39 AM        Lab Results   Component Value Date/Time    HBA1C 5.2 05/16/2024 11:24 AM          Imaging:   I personally reviewed following images    MRI lumbar spine 5/16/2024  Posterior fusion L4-5.  Left paracentral disc bulge at L5-S1 which is adjacent to the descending nerve roots.  Facet arthropathy bilaterally at L5-S1.  Facet arthropathy bilaterally at L3-4.  There is severe central canal stenosis at L3-4.  There is foraminal stenosis bilaterally at L3-4.    MRI lumbar spine 9/27/2020  At L5-S1 there is a disc protrusion.  This is causing mild to moderate central canal stenosis and moderate bilateral stenosis right worse than left.  History of L4-5 posterior fusion.  At L3-4 there is moderate to severe central canal stenosis and bilateral neuroforaminal stenosis.  Facet arthropathy at L3-4 and " L5-S1.  There are no acute fractures.     I reviewed the fluoroscopic images from 10/12/2020 showing successful bilateral L3-4 transforaminal epidural steroid injection.  I reviewed these with the patient on 11/5/2020.      Results  Imaging  Severe spinal stenosis above the level of the previous fusion.       I reviewed the following radiology reports    MRI lumbar spine 5/16/2024 T12-L3 neuroforamina and spinal canal are patent.  L3-4 small to moderate circumferential disc bulge with bilateral facet arthrosis ligamentum flavum hypertrophy severe narrowing of the spinal canal.  AP thecal sac diameter measures 5 mm.  Neuroforamina are patent.  L4-5 postsurgical changes as described bilateral facet arthrosis.  Neuroforamina and spinal canal are patent  L5-S1 small to moderate circumferential disc bulge with superimposed left paracentral disc protrusion.  Bilateral facet arthrosis.  Mild narrowing of the right neuroforamen.  There is also narrowing of the left lateral recess containing the traversing left S1 nerve root.  Impression  Degenerative and postsurgical changes as described.  Including left lateral recess narrowing at L5-S1 and severe spinal canal stenosis at L3-4.          Results for orders placed during the hospital encounter of 09/27/20    MR-LUMBAR SPINE-W/O    Impression  1.  L3-4 mild-moderate spinal stenosis    2.  L5-S1 left paracentral disc extrusion moderately narrowing the left lateral recess and foramen    3.  L4-5 postoperative changes appear within normal limits                                                                                                                 Results for orders placed during the hospital encounter of 10/15/20    DX-LUMBAR SPINE-4+ VIEWS    Impression  1.  No compression deformity or acute fracture is identified.    2.  Degenerative disc disease and facet arthropathy is most prominent in the lower lumbar spine. Posterior fusion is noted at L4-L5.    3.  No focal  instability is noted on flexion extension views.                        DIAGNOSIS   Visit Diagnoses     ICD-10-CM   1. Lumbar radiculopathy  M54.16   2. Spinal stenosis of lumbar region, unspecified whether neurogenic claudication present  M48.061   3. History of lumbosacral spine surgery L4-5 fusion done by Dr Barrie Espinoza  [id number]         ASSESSMENT and PLAN:     Zachery Esquivel  1965 male      Zachery was seen today for follow-up.    Diagnoses and all orders for this visit:    Lumbar radiculopathy  -     Referral to Physical Medicine Rehab    Spinal stenosis of lumbar region, unspecified whether neurogenic claudication present    History of lumbosacral spine surgery L4-5 fusion done by Dr Barrie Espinoza        Assessment & Plan  Lumbar radiculopathy  His symptoms, including severe pain and spasms, suggest severe spinal stenosis, particularly above the level of the previous fusion. The pain radiating down his arm and numbness in his hand indicate a cervical origin.     A transforaminal epidural steroid injection on both sides at L3-4 is planned, hopefully replicating the previous successful treatment.         Cervical Radiculopathy.  The numbness in his fingers and pain radiating down the arm are consistent with cervical radiculopathy. An MRI of the neck area is recommended to assess the severity and exact location of the nerve compression. If the MRI confirms significant findings, an epidural steroid injection in the cervical region may be considered.    Medication Management.:    He is currently on methocarbamol for muscle spasms, which provides temporary relief. Continuation of methocarbamol is advised for symptomatic relief.          Follow up: After the above procedure    Thank you for allowing me to participate in the care of this patient. If you have any questions please not hesitate to contact me.             Please note that this dictation was created using voice recognition software. I have made  every reasonable attempt to correct obvious errors but there may be errors of grammar and content that I may have overlooked prior to finalization of this note.      Jack Larson MD  Interventional Spine and Sports Physiatry  Physical Medicine and Rehabilitation  [facility]

## 2024-09-05 ENCOUNTER — APPOINTMENT (RX ONLY)
Dept: URBAN - METROPOLITAN AREA CLINIC 36 | Facility: CLINIC | Age: 59
Setting detail: DERMATOLOGY
End: 2024-09-05

## 2024-09-05 DIAGNOSIS — L71.1 RHINOPHYMA: ICD-10-CM

## 2024-09-05 PROCEDURE — ? SUNSCREEN RECOMMENDATIONS

## 2024-09-05 PROCEDURE — 99213 OFFICE O/P EST LOW 20 MIN: CPT | Mod: 24

## 2024-09-05 PROCEDURE — ? COUNSELING

## 2024-09-05 NOTE — PROCEDURE: COUNSELING
Patient Specific Counseling (Will Not Stick From Patient To Patient): Much improved appearance of the nose. May need a second procedure to help with area of lumpiness on right Ala Will wait until after fishing season.
Detail Level: Detailed

## 2024-09-11 ENCOUNTER — APPOINTMENT (OUTPATIENT)
Dept: RADIOLOGY | Facility: REHABILITATION | Age: 59
End: 2024-09-11
Attending: PHYSICAL MEDICINE & REHABILITATION
Payer: COMMERCIAL

## 2024-09-11 ENCOUNTER — HOSPITAL ENCOUNTER (OUTPATIENT)
Facility: REHABILITATION | Age: 59
End: 2024-09-11
Attending: PHYSICAL MEDICINE & REHABILITATION | Admitting: PHYSICAL MEDICINE & REHABILITATION
Payer: COMMERCIAL

## 2024-09-11 ENCOUNTER — HOSPITAL ENCOUNTER (OUTPATIENT)
Dept: RADIOLOGY | Facility: REHABILITATION | Age: 59
End: 2024-09-11
Attending: PHYSICAL MEDICINE & REHABILITATION

## 2024-09-11 VITALS
TEMPERATURE: 97.7 F | RESPIRATION RATE: 16 BRPM | BODY MASS INDEX: 31.14 KG/M2 | DIASTOLIC BLOOD PRESSURE: 95 MMHG | WEIGHT: 229.94 LBS | HEIGHT: 72 IN | OXYGEN SATURATION: 98 % | SYSTOLIC BLOOD PRESSURE: 130 MMHG | HEART RATE: 70 BPM

## 2024-09-11 LAB — GLUCOSE BLD STRIP.AUTO-MCNC: 115 MG/DL (ref 65–99)

## 2024-09-11 PROCEDURE — 82962 GLUCOSE BLOOD TEST: CPT

## 2024-09-11 PROCEDURE — 700111 HCHG RX REV CODE 636 W/ 250 OVERRIDE (IP): Mod: JZ

## 2024-09-11 PROCEDURE — 700117 HCHG RX CONTRAST REV CODE 255

## 2024-09-11 PROCEDURE — 64483 NJX AA&/STRD TFRM EPI L/S 1: CPT

## 2024-09-11 RX ORDER — DEXAMETHASONE SODIUM PHOSPHATE 10 MG/ML
INJECTION, SOLUTION INTRAMUSCULAR; INTRAVENOUS
Status: COMPLETED
Start: 2024-09-11 | End: 2024-09-11

## 2024-09-11 RX ORDER — LIDOCAINE HYDROCHLORIDE 10 MG/ML
INJECTION, SOLUTION EPIDURAL; INFILTRATION; INTRACAUDAL; PERINEURAL
Status: COMPLETED
Start: 2024-09-11 | End: 2024-09-11

## 2024-09-11 RX ADMIN — IOHEXOL 10 ML: 240 INJECTION, SOLUTION INTRATHECAL; INTRAVASCULAR; INTRAVENOUS; ORAL at 10:21

## 2024-09-11 RX ADMIN — LIDOCAINE HYDROCHLORIDE 10 ML: 10 INJECTION, SOLUTION EPIDURAL; INFILTRATION; INTRACAUDAL; PERINEURAL at 10:22

## 2024-09-11 RX ADMIN — DEXAMETHASONE SODIUM PHOSPHATE 10 MG: 10 INJECTION, SOLUTION INTRAMUSCULAR; INTRAVENOUS at 10:21

## 2024-09-11 ASSESSMENT — FIBROSIS 4 INDEX: FIB4 SCORE: 1.13

## 2024-09-11 NOTE — INTERVAL H&P NOTE
H&P reviewed. The patient was examined and there are no changes to the H&P       Lungs clear to auscultation bilaterally.  No abdominal tenderness.  Heart regular rate and rhythm.  Vitals reviewed.    Proceed with the originally scheduled procedure.  The risks, benefits and alternatives were discussed.  The patient understands these.    Pre-Op Diagnosis Codes:      * Lumbar radiculopathy [M54.16]  Procedure(s):  BILATERAL L3-4 transforaminal epidural steroid injection with fluoroscopic guidance…..Note: 22-5    Jack Larson MD  Physical Medicine and Rehabilitation  Interventional Spine and Sports Physiatry  [facility]

## 2024-09-11 NOTE — OR SURGEON
Immediate Post OP Note    Pre-Op Diagnosis Codes:      * Lumbar radiculopathy [M54.16]      Post-Op Diagnosis Codes:     * Lumbar radiculopathy [M54.16]      Procedure(s):  BILATERAL L3-4 transforaminal epidural steroid injection with fluoroscopic guidance     - Wound Class: Clean    Surgeon(s):  Jack Larson M.D.    Anesthesiologist/Type of Anesthesia:  No anesthesia staff entered./Local    Surgical Staff:  Circulator: Elmira Ho R.N.  Scrub Person: Tiffanie Scott R.N.  Radiology Technologist: Maria G Torres    Specimens removed if any:  * No specimens in log *    Estimated Blood Loss: None    Findings: None    Complications: None        9/11/2024 10:31 AM Jack Larson M.D.

## 2024-09-11 NOTE — OP REPORT
Date of Service: 9/11/2024     Patient: Zachery Esquivel 58 y.o. male     MRN: [id number]     Physician/s: Jack Larson MD    Pre-operative Diagnosis: Lumbar radiculopathy    Post-operative Diagnosis: Lumbar radiculopathy    Procedure: bilateral  Lumbar Transforaminal Epidural Steroid  at the L3-4 levels.     Description of procedure:    The risks, benefits, and alternatives of the procedure were reviewed and discussed with the patient.  Written informed consent was freely obtained. A pre-procedural time-out was conducted by the physician verifying patient’s identity, procedure to be performed, procedure site and side, and allergy verification. Appropriate equipment was determined to be in place for the procedure.         The patient's vital signs were carefully monitored before, throughout, and after the procedure.     In the fluoroscopy suite the patient was placed in a prone position, a pillow placed underneath their umbilicus. The skin was prepped and draped in the usual sterile fashion.     The fluoroscope was placed over the lumbar spine and adjusted into the proper AP/Oblique view to enter the transforaminal space just adjacent to the pedicle at the levels below. The targets for injection were then marked at the right L3-4. A 27g 1.5 inch needle was placed into the marked site, and 1mL of 1% Lidocaine was injected subcutaneously into the epidermal and dermal layers. The needle was removed intact.  A 22g 5 inch spinal needle was then placed and advanced under fluoroscopic guidance in an oblique view towards the epidural space of the levels noted above. The needle position was confirmed to not be past the 6 o'clock position in the AP view and it was in the neuroforamen in the lateral view.        The fluoroscope was was then adjusted over the lumbar spine and adjusted into the proper AP/Oblique view to enter the transforaminal space adjacent to the pedicle at the LEFT  L3-4. A 27g 1.5 inch needle was  placed into the marked site, and 1mL of 1% Lidocaine was injected subcutaneously into the epidermal and dermal layers. The needle was removed intact.  A 22g 5 inch spinal needle was then placed and advanced under fluoroscopic guidance in an oblique view towards the epidural space of the levels noted above. The needle position was confirmed to not be past the 6 o'clock position in the AP view and it was in the neuroforamen in the lateral view.       Under live fluoroscopic guidance in the AP view, contrast dye was used to highlight the epidural space spread of each level above. Final fluoroscopic images were saved.  Following negative aspiration, 1mL of 1% lidocaine preservative free with 5mg dexamethasone   was then injected at each level above, and the needles were removed intact after restyleted. The patient's back was covered with a 4x4 gauze, the area was cleansed with sterile normal saline, and a dressing was applied. There were no complications noted.     The patient was then evaluated post-procedure, and was hemodynamically stable prior to leaving the post-operative care unit.     The patient had 80% pain relief postprocedure  Preprocedural pain: 7/10 NRS  Postprocedural pain: 2/10 NRS    Follow-up as scheduled    Jack Larson MD  Interventional Spine and Pain  Physical Medicine and Rehabilitation  [facility]          CPT codes  Transforaminal epidural injection- lumbar or sacral (first level):  48951-96

## 2024-09-12 ENCOUNTER — TELEPHONE (OUTPATIENT)
Dept: PHYSICAL MEDICINE AND REHAB | Facility: MEDICAL CENTER | Age: 59
End: 2024-09-12
Payer: COMMERCIAL

## 2024-09-12 NOTE — TELEPHONE ENCOUNTER
Called for Post -SP check up: BILATERAL L3-4 transforaminal epidural steroid injection with fluoroscopic guidance     Change in pain:    Concerns?    Confirmed [facility] appt?

## 2024-10-24 ENCOUNTER — APPOINTMENT (OUTPATIENT)
Dept: PHYSICAL MEDICINE AND REHAB | Facility: MEDICAL CENTER | Age: 59
End: 2024-10-24
Payer: COMMERCIAL

## 2024-10-24 VITALS
HEART RATE: 74 BPM | WEIGHT: 229 LBS | DIASTOLIC BLOOD PRESSURE: 84 MMHG | TEMPERATURE: 97.4 F | OXYGEN SATURATION: 94 % | SYSTOLIC BLOOD PRESSURE: 112 MMHG | HEIGHT: 72 IN | BODY MASS INDEX: 31.02 KG/M2

## 2024-10-24 DIAGNOSIS — Z71.82 EXERCISE COUNSELING: ICD-10-CM

## 2024-10-24 DIAGNOSIS — M54.16 LUMBAR RADICULOPATHY: ICD-10-CM

## 2024-10-24 DIAGNOSIS — Z98.890 HISTORY OF LUMBOSACRAL SPINE SURGERY: ICD-10-CM

## 2024-10-24 DIAGNOSIS — M48.061 SPINAL STENOSIS OF LUMBAR REGION, UNSPECIFIED WHETHER NEUROGENIC CLAUDICATION PRESENT: ICD-10-CM

## 2024-10-24 DIAGNOSIS — E66.9 OBESITY (BMI 30-39.9): ICD-10-CM

## 2024-10-24 PROCEDURE — 99214 OFFICE O/P EST MOD 30 MIN: CPT | Performed by: PHYSICAL MEDICINE & REHABILITATION

## 2024-10-24 PROCEDURE — 3074F SYST BP LT 130 MM HG: CPT | Performed by: PHYSICAL MEDICINE & REHABILITATION

## 2024-10-24 PROCEDURE — 3079F DIAST BP 80-89 MM HG: CPT | Performed by: PHYSICAL MEDICINE & REHABILITATION

## 2024-10-24 PROCEDURE — G2211 COMPLEX E/M VISIT ADD ON: HCPCS | Performed by: PHYSICAL MEDICINE & REHABILITATION

## 2024-10-24 PROCEDURE — 1125F AMNT PAIN NOTED PAIN PRSNT: CPT | Performed by: PHYSICAL MEDICINE & REHABILITATION

## 2024-10-24 ASSESSMENT — PATIENT HEALTH QUESTIONNAIRE - PHQ9
SUM OF ALL RESPONSES TO PHQ QUESTIONS 1-9: 7
CLINICAL INTERPRETATION OF PHQ2 SCORE: 1
5. POOR APPETITE OR OVEREATING: 1 - SEVERAL DAYS

## 2024-10-24 ASSESSMENT — FIBROSIS 4 INDEX: FIB4 SCORE: 1.13

## 2024-10-24 ASSESSMENT — PAIN SCALES - GENERAL: PAINLEVEL: 6=MODERATE PAIN

## 2024-10-24 NOTE — H&P (VIEW-ONLY)
Follow up patient note  Interventional spine and Pain  Physiatry (physical medicine and  Rehabilitation)       Chief complaint:   Chief Complaint   Patient presents with    Follow-Up     Back pain          HISTORY    Please see new patient note by Dr Larson,  for more details.     HPI  Patient identification: Zachery Esquivel ,  1965,   With Diagnoses of Lumbar radiculopathy, Spinal stenosis of lumbar region, History of lumbosacral spine surgery L4-5 fusion done by Dr Barrie Espinoza, BMI 30-39.9, and Exercise counseling were pertinent to this visit.       Procedures:  10/12/2020 bilateral L3-4 transforaminal epidural steroid injection with 80% pain relief post procedure   2021 bilateral L3-4 transforaminal epidural steroid injection 90% improvement for 1 year     Medications tried oxycodone, Zanaflex, Flexeril, Medrol Dosepak, diclofenac, Robaxin      Verbal consent was obtained for Tonny copilot : Yes      History of Present Illness     The patient is a 58-year-old male here for follow-up.    His pain has changed after the epidural steroid injection. He does not have pain radiating down the anterior aspect of the legs, but he continues to have pain radiating down the posterior aspect, mainly on the right lower extremity down the posterior aspect of the right leg. There is also low back pain that is aching in quality, rated 9 out of 10 in intensity, and this is more apparent now. The pain is constant and worsens with sitting, standing, and walking.    He reports experiencing tightness, stiffness, and soreness in his lower back and right side. Occasionally, he experiences cramps in his calf, including a week-long episode of calf cramp. His pain intensifies at night, disrupting his sleep. His left side, however, is doing well.    He has been to physical therapy in the past and has done a home exercise program for the past 6 months. He has tried medication management, including Robaxin, diclofenac,  acetaminophen, and other medications.           ROS Red Flags :   Fever, Chills, Sweats: Denies  Involuntary Weight Loss: Denies  Bowel/Bladder Incontinence: Denies  Saddle Anesthesia: Denies        PMHx:   Past Medical History:   Diagnosis Date    Low back pain     S/P laparoscopic fundoplication        PSHx:   Past Surgical History:   Procedure Laterality Date    [place] NJX AA&/STRD TFRML EPI LUMBAR/SACRAL 1 LEVEL Bilateral 9/11/2024    Procedure: BILATERAL L3-4 transforaminal epidural steroid injection with fluoroscopic guidance…..Note: 22-5;  Surgeon: Jack Larson M.D.;  [facility];  Service: Pain Management    IL NJX AA&/STRD TFRML EPI LUMBAR/SACRAL 1 LEVEL Bilateral 6/21/2021    Procedure: BILATERAL L3-4 transforaminal epidural steroid injection with fluoroscopic guidance;  Surgeon: Jack Larson M.D.;  Location: [facility];  Service: Pain Management    IL NJX AA&/STRD TFRML EPI LUMBAR/SACRAL 1 LEVEL Bilateral 10/12/2020    Procedure: INJECTION, SPINE, LUMBOSACRAL, EPIDURAL, 1 LEVEL, TRANSFORAMINAL APPROACH;  Surgeon: Jack Larson M.D.;  Location: [facility];  Service: Pain Management    ABDOMINAL EXPLORATION      APPENDECTOMY      CHOLECYSTECTOMY      LUMBAR LAMINECTOMY DISKECTOMY      l4-5 with fusion    NISSEN FUNDOPLICATION LAPAROSCOPIC         Family history   Family History   Problem Relation Age of Onset    Diabetes Father     Breast Cancer Sister 35    Diabetes Paternal Grandmother     Heart Disease Neg Hx     Stroke Neg Hx     Hypertension Neg Hx     Hyperlipidemia Neg Hx     Colorectal Cancer Neg Hx          Medications:   Outpatient Medications Marked as Taking for the 10/24/24 encounter (Office Visit) with Jack Larson M.D.   Medication Sig Dispense Refill    diclofenac DR (VOLTAREN) 50 MG Tablet Delayed Response Take 50 mg by mouth 2 times a day.      methocarbamol (ROBAXIN) 750 MG Tab       doxycycline (VIBRAMYCIN) 100  "MG Cap       lidocaine (LIDODERM) 5 % Patch APPLY 2 PATCHES TO SKIN ONCE A DAY FOR PAIN **APPLY AND PRESS FIRMLY FOR 10-15 SECONDS, KEEP ON FOR 12 HOURS, THEN REMOVE FOR 12 HOURS**      testosterone cypionate (DEPO-TESTOSTERONE) 200 MG/ML injection 200 mg ( 1 ml ) intramuscular every 7 days for 90 days      vitamin D2, Ergocalciferol, (DRISDOL) 1.25 MG (99494 UT) Cap capsule Take 1 Capsule by mouth every 7 days. 12 Capsule 3    NEEDLE, DISP, 25 G 25G X 5/8\" Misc Please dispense 3-month supply, 3 needles x90 days for vitamin B12 injections.  Thank you 3 Each 3    Continuous Blood Gluc  (FREESTYLE KAILYN 14 DAY READER) Device 1 APPLICATION. EVERY 14 DAYS. 3 Each 3    Continuous Blood Gluc Sensor (FREESTYLE KAILYN 14 DAY SENSOR) Misc 1 Application. every 14 days. 4 Each 3    Continuous Blood Gluc  (FREESTYLE KAILYN 2 READER) Device 3 mos supply please 1 Each 1    Continuous Blood Gluc Sensor (FREESTYLE KAILYN 2 SENSOR) [facility] [place] 3 mos supply please 1 Each 1        Current Outpatient Medications on File Prior to Visit   Medication Sig Dispense Refill    diclofenac DR (VOLTAREN) 50 MG Tablet Delayed Response Take 50 mg by mouth 2 times a day.      methocarbamol (ROBAXIN) 750 MG Tab       doxycycline (VIBRAMYCIN) 100 MG Cap       lidocaine (LIDODERM) 5 % Patch APPLY 2 PATCHES TO SKIN ONCE A DAY FOR PAIN **APPLY AND PRESS FIRMLY FOR 10-15 SECONDS, KEEP ON FOR 12 HOURS, THEN REMOVE FOR 12 HOURS**      testosterone cypionate (DEPO-TESTOSTERONE) 200 MG/ML injection 200 mg ( 1 ml ) intramuscular every 7 days for 90 days      vitamin D2, Ergocalciferol, (DRISDOL) 1.25 MG (67803 UT) Cap capsule Take 1 Capsule by mouth every 7 days. 12 Capsule 3    NEEDLE, DISP, 25 G 25G X 5/8\" Misc Please dispense 3-month supply, 3 needles x90 days for vitamin B12 injections.  Thank you 3 Each 3    Continuous Blood Gluc  (FREESTYLE KAILYN 14 DAY READER) Device 1 APPLICATION. EVERY 14 DAYS. 3 Each 3    Continuous Blood Gluc Sensor " (FREESTYLE KAILYN 14 DAY SENSOR) [facility] 1 Application. every 14 days. 4 Each 3    Continuous Blood Gluc  (FREESTYLE KAILYN 2 READER) Device 3 mos supply please 1 Each 1    Continuous Blood Gluc Sensor (FREESTYLE KAILYN 2 SENSOR) [facility] 3 mos supply please 1 Each 1    clindamycin (CLEOCIN) 1 % Solution APPLY A SMALL AMOUNT TO AFFECTED AREA(S) TWICE A DAY FOR ROSACEA      Tretinoin, Facial Wrinkles, 0.05 % Cream Apply  topically.      fluticasone (FLONASE) 50 MCG/ACT nasal spray Administer 1 Spray into affected nostril(S) every day.       No current facility-administered medications on file prior to visit.         Allergies:   Allergies   Allergen Reactions    Penicillins Rash     .    Tylenol      Can't take due to elevated liver enzymes    Bactrim [Sulfamethoxazole W-Trimethoprim]      Itching rash palms and soles       Social Hx:   Social History     Socioeconomic History    Marital status:      Spouse name: Not on file    Number of children: Not on file    Years of education: Not on file    Highest education level: Associate degree: academic program   Occupational History    Not on file   Tobacco Use    Smoking status: Never    Smokeless tobacco: Never   Vaping Use    Vaping status: Never Used   Substance and Sexual Activity    Alcohol use: Yes     Alcohol/week: 1.0 oz     Types: 2 Glasses of wine per week     Comment: Occasionally    Drug use: No    Sexual activity: Yes     Partners: Female   Other Topics Concern     Service No    Blood Transfusions Yes    Caffeine Concern No    Occupational Exposure No    Hobby Hazards No    Sleep Concern Yes    Stress Concern Yes    Weight Concern No    Special Diet No    Back Care Yes     Comment: back brace    Exercise Yes    Bike Helmet No    Seat Belt Yes    Self-Exams No   Social History Narrative    Not on file     Social Determinants of Health     Financial Resource Strain: Low Risk  (4/9/2023)    Overall Financial Resource Strain (CARDIA)     Difficulty  of Paying Living Expenses: Not hard at all   Food Insecurity: No Food Insecurity (4/9/2023)    Hunger Vital Sign     Worried About Running Out of Food in the Last Year: Never true     Ran Out of Food in the Last Year: Never true   Transportation Needs: No Transportation Needs (4/9/2023)    PRAPARE - Transportation     Lack of Transportation (Medical): No     Lack of Transportation (Non-Medical): No   Physical Activity: Insufficiently Active (4/9/2023)    Exercise Vital Sign     Days of Exercise per Week: 2 days     Minutes of Exercise per Session: 30 min   Stress: No Stress Concern Present (4/9/2023)    [employer] [place] [employer] - Occupational Stress Questionnaire     Feeling of Stress : Only a little   Social Connections: Socially Integrated (4/9/2023)    Social Connection and Isolation Panel [NHANES]     Frequency of Communication with Friends and Family: Once a week     Frequency of Social Gatherings with Friends and Family: Twice a week     Attends [employer] Services: 1 to 4 times per year     Active Member of Clubs or Organizations: Yes     Attends Club or Organization Meetings: More than 4 times per year     Marital Status:    Intimate Partner Violence: Not on file   Housing Stability: Low Risk  (4/9/2023)    Housing Stability Vital Sign     Unable to Pay for Housing in the Last Year: No     Number of Places Lived in the Last Year: 1     Unstable Housing in the Last Year: No         EXAMINATION     Physical Exam:   Vitals: /84 (BP Location: Left arm, Patient Position: Sitting, BP Cuff Size: Large adult)   Pulse 74   Temp 36.3 °C (97.4 °F) (Temporal)   Ht 1.829 m (6')   Wt 104 kg (229 lb)   SpO2 94%     Constitutional:   Body Habitus: Body mass index is 31.06 kg/m².  Cooperation: Fully cooperates with exam  Appearance: Well-groomed no disheveled    Respiratory-  breathing comfortable on room air, no audible wheezing  Cardiovascular- capillary refills less than 2 seconds. No  "lower extremity edema is noted.   Psychiatric- alert and oriented ×3. Normal affect.    MSK and Neuro: -    Physical Exam         Thoracic/Lumbar Spine/Sacral Spine/Hips   There are no signs of infection around the injection sites.   decreased active range of motion with flexion, lateral flexion, and rotation bilaterally.   There is decreased active range of motion with lumbar extension.    There is  pain with lumbar extension.   There is pain with facet loading maneuver (extension rotation) with axial low back pain on the BILATERAL side(s)       Palpation:   No tenderness to palpation in midline at T1-T12 levels. No tenderness to palpation in the left and right of the midline T1-L5, NEGATIVE for tenderness to palpation to the para-midline region in the lower lumbar levels.  palpation over SI joint: negative bilaterally    palpation in hip or over the gluteus medius tendon insertion: negative bilaterally      Lumbar spine Special tests  Neuro tension  Straight leg test positive right, negative left    Slump test positive right, negative left      Key points for the international standards for neurological classification of spinal cord injury (ISNCSCI) to light touch.     Dermatome R L                                      L2 2 2   L3 2 2   L4 1 2   L5 1 2   S1 2 2   S2 2 2         Motor Exam Lower Extremities    ? Myotome R L   Hip flexion L2 5 5   Knee extension L3 5 5   Ankle dorsiflexion L4 5 5   Toe extension L5 4 5   Ankle plantarflexion S1 5 5           MEDICAL DECISION MAKING    DATA    Labs:   Lab Results   Component Value Date/Time    SODIUM 140 05/16/2024 11:24 AM    POTASSIUM 4.2 05/16/2024 11:24 AM    CHLORIDE 106 05/16/2024 11:24 AM    CO2 23 05/16/2024 11:24 AM    GLUCOSE 102 (H) 05/16/2024 11:24 AM    BUN 15 05/16/2024 11:24 AM    CREATININE 1.00 05/16/2024 11:24 AM        No results found for: \"PROTHROMBTM\", \"INR\"     Lab Results   Component Value Date/Time    WBC 7.0 05/16/2024 11:24 AM    RBC 5.66 " 05/16/2024 11:24 AM    HEMOGLOBIN 18.1 (H) 05/16/2024 11:24 AM    HEMATOCRIT 52.0 05/16/2024 11:24 AM    MCV 91.9 05/16/2024 11:24 AM    MCH 32.0 05/16/2024 11:24 AM    MCHC 34.8 05/16/2024 11:24 AM    MPV 10.6 05/16/2024 11:24 AM    NEUTSPOLYS 60.50 06/07/2022 08:39 AM    LYMPHOCYTES 22.90 06/07/2022 08:39 AM    MONOCYTES 10.50 06/07/2022 08:39 AM    EOSINOPHILS 4.90 06/07/2022 08:39 AM    BASOPHILS 0.90 06/07/2022 08:39 AM        Lab Results   Component Value Date/Time    HBA1C 5.2 05/16/2024 11:24 AM          Imaging:   I personally reviewed following images    MRI lumbar spine 5/16/2024  Posterior fusion L4-5.  Left paracentral disc bulge at L5-S1 which is adjacent to the descending nerve roots.  Facet arthropathy bilaterally at L5-S1.  Facet arthropathy bilaterally at L3-4.  There is severe central canal stenosis at L3-4.  There is foraminal stenosis bilaterally at L3-4.    MRI lumbar spine 9/27/2020  At L5-S1 there is a disc protrusion.  This is causing mild to moderate central canal stenosis and moderate bilateral stenosis right worse than left.  History of L4-5 posterior fusion.  At L3-4 there is moderate to severe central canal stenosis and bilateral neuroforaminal stenosis.  Facet arthropathy at L3-4 and L5-S1.  There are no acute fractures.     I reviewed the fluoroscopic images from 10/12/2020 showing successful bilateral L3-4 transforaminal epidural steroid injection.  I reviewed these with the patient on 11/5/2020.      Results    Imaging  MRI shows stenosis at L3-4.       I reviewed the following radiology reports    MRI lumbar spine 5/16/2024 T12-L3 neuroforamina and spinal canal are patent.  L3-4 small to moderate circumferential disc bulge with bilateral facet arthrosis ligamentum flavum hypertrophy severe narrowing of the spinal canal.  AP thecal sac diameter measures 5 mm.  Neuroforamina are patent.  L4-5 postsurgical changes as described bilateral facet arthrosis.  Neuroforamina and spinal canal  are patent  L5-S1 small to moderate circumferential disc bulge with superimposed left paracentral disc protrusion.  Bilateral facet arthrosis.  Mild narrowing of the right neuroforamen.  There is also narrowing of the left lateral recess containing the traversing left S1 nerve root.  Impression  Degenerative and postsurgical changes as described.  Including left lateral recess narrowing at L5-S1 and severe spinal canal stenosis at L3-4.          Results for orders placed during the hospital encounter of 20    MR-LUMBAR SPINE-W/O    Impression  1.  L3-4 mild-moderate spinal stenosis    2.  L5-S1 left paracentral disc extrusion moderately narrowing the left lateral recess and foramen    3.  L4-5 postoperative changes appear within normal limits                                                                                                                 Results for orders placed during the hospital encounter of 10/15/20    DX-LUMBAR SPINE-4+ VIEWS    Impression  1.  No compression deformity or acute fracture is identified.    2.  Degenerative disc disease and facet arthropathy is most prominent in the lower lumbar spine. Posterior fusion is noted at L4-L5.    3.  No focal instability is noted on flexion extension views.                        DIAGNOSIS   Visit Diagnoses     ICD-10-CM   1. Lumbar radiculopathy  M54.16   2. Spinal stenosis of lumbar region  M48.061   3. History of lumbosacral spine surgery L4-5 fusion done by Dr Barrie Espinoza  Z98.890   4. BMI 30-39.9  E66.9   5. Exercise counseling  Z71.82         ASSESSMENT and PLAN:     Zachery Spicerdonaldo  1965 male      Zachery was seen today for follow-up.    Diagnoses and all orders for this visit:    Lumbar radiculopathy  -     Referral to Physical Medicine Rehab    Spinal stenosis of lumbar region    History of lumbosacral spine surgery L4-5 fusion done by Dr Barrie Espinoza    BMI 30-39.9    Exercise counseling        Assessment & Plan  The pain is now  more concentrated on the back of the leg and down the right side, originating from the area below the previous treatment site, specifically at and just below the level of the surgery. The MRI indicates central canal stenosis at L3-4 however the majority the pain is coming from the lumbar spine levels at L4-5 and L5-S1 and there is stenosis there as well.     An epidural steroid injection, transforaminal approach on the right side at L4-5 and L5-S1, will be administered. He is advised to continue walking but to take breaks and rest as needed.    The risks benefits and alternatives to this procedure were discussed and the patient wishes to proceed with the procedure. Risks include but are not limited to damage to surrounding structures, infection, bleeding, worsening of pain which can be permanent, weakness which can be permanent. Benefits include pain relief, improved function. Alternatives includes not doing the procedure.      Medications: Continue methocarbamol        Follow up: After the above procedure    Thank you for allowing me to participate in the care of this patient. If you have any questions please not hesitate to contact me.             Please note that this dictation was created using voice recognition software. I have made every reasonable attempt to correct obvious errors but there may be errors of grammar and content that I may have overlooked prior to finalization of this note.      Jack Larson MD  Interventional Spine and Sports Physiatry  Physical Medicine and Rehabilitation  [facility]

## 2024-11-08 ENCOUNTER — APPOINTMENT (OUTPATIENT)
Dept: RADIOLOGY | Facility: REHABILITATION | Age: 59
End: 2024-11-08
Attending: PHYSICAL MEDICINE & REHABILITATION
Payer: COMMERCIAL

## 2024-11-08 ENCOUNTER — HOSPITAL ENCOUNTER (OUTPATIENT)
Facility: REHABILITATION | Age: 59
End: 2024-11-08
Attending: PHYSICAL MEDICINE & REHABILITATION | Admitting: PHYSICAL MEDICINE & REHABILITATION
Payer: COMMERCIAL

## 2024-11-08 VITALS
BODY MASS INDEX: 31.2 KG/M2 | DIASTOLIC BLOOD PRESSURE: 96 MMHG | WEIGHT: 230.38 LBS | SYSTOLIC BLOOD PRESSURE: 144 MMHG | OXYGEN SATURATION: 93 % | TEMPERATURE: 98.4 F | HEART RATE: 78 BPM | RESPIRATION RATE: 16 BRPM | HEIGHT: 72 IN

## 2024-11-08 PROCEDURE — 64483 NJX AA&/STRD TFRM EPI L/S 1: CPT

## 2024-11-08 PROCEDURE — 700117 HCHG RX CONTRAST REV CODE 255

## 2024-11-08 PROCEDURE — 64484 NJX AA&/STRD TFRM EPI L/S EA: CPT

## 2024-11-08 PROCEDURE — 700111 HCHG RX REV CODE 636 W/ 250 OVERRIDE (IP): Mod: JZ

## 2024-11-08 RX ORDER — DEXAMETHASONE SODIUM PHOSPHATE 10 MG/ML
INJECTION, SOLUTION INTRAMUSCULAR; INTRAVENOUS
Status: COMPLETED
Start: 2024-11-08 | End: 2024-11-08

## 2024-11-08 RX ORDER — LIDOCAINE HYDROCHLORIDE 10 MG/ML
INJECTION, SOLUTION EPIDURAL; INFILTRATION; INTRACAUDAL; PERINEURAL
Status: COMPLETED
Start: 2024-11-08 | End: 2024-11-08

## 2024-11-08 RX ADMIN — IOHEXOL 10 ML: 240 INJECTION, SOLUTION INTRATHECAL; INTRAVASCULAR; INTRAVENOUS; ORAL at 15:46

## 2024-11-08 RX ADMIN — LIDOCAINE HYDROCHLORIDE 10 ML: 10 INJECTION, SOLUTION EPIDURAL; INFILTRATION; INTRACAUDAL; PERINEURAL at 15:48

## 2024-11-08 RX ADMIN — DEXAMETHASONE SODIUM PHOSPHATE 10 MG: 10 INJECTION, SOLUTION INTRAMUSCULAR; INTRAVENOUS at 15:46

## 2024-11-08 ASSESSMENT — PAIN DESCRIPTION - PAIN TYPE: TYPE: CHRONIC PAIN

## 2024-11-08 ASSESSMENT — FIBROSIS 4 INDEX: FIB4 SCORE: 1.13

## 2024-11-08 NOTE — INTERVAL H&P NOTE
Consented Procedure: RIGHT L4-5 and L5-S1 transforaminal epidural steroid injection with fluoroscopic guidance  I have examined the patient, provided the risks, benefits, and alternatives to the procedure(s) indicated on the signed consent form, and the patient wishes to proceed.    H&P reviewed. The patient was examined and there are no changes to the H&P      Jakc Lrason M.D.  11/08/24 2:56 PM

## 2024-11-09 NOTE — OR SURGEON
Immediate Post OP Note    Pre-Op Diagnosis Codes:      * Lumbar radiculopathy [M54.16]      Post-Op Diagnosis Codes:     * Lumbar radiculopathy [M54.16]      Procedure(s):  RIGHT L4-5 and L5-S1 transforaminal epidural steroid injection with fluoroscopic guidance - Wound Class: Clean    Surgeon(s):  Jack Larson M.D.    Anesthesiologist/Type of Anesthesia:  No anesthesia staff entered./Local    Surgical Staff:  Circulator: Elmira Ho R.N.  Scrub Person: Ana Norton  Radiology Technologist: Rafa Stover    Specimens removed if any:  * No specimens in log *    Estimated Blood Loss: None    Findings: None    Complications: None        11/8/2024 4:03 PM Jack Larson M.D.

## 2024-11-09 NOTE — OP REPORT
Date of Service: 11/8/2024     Patient: Zachery Esquivel 58 y.o. male     MRN: [id number]     Physician/s: Jack Larson MD    Pre-operative Diagnosis: Lumbar radiculopathy    Post-operative Diagnosis: Lumbar radiculopathy    Procedure: right Lumbar Transforaminal Epidural Steroid  at the L4-5 and L5-S1 levels.     Description of procedure:    The risks, benefits, and alternatives of the procedure were reviewed and discussed with the patient.  Written informed consent was freely obtained. A pre-procedural time-out was conducted by the physician verifying patient’s identity, procedure to be performed, procedure site and side, and allergy verification. Appropriate equipment was determined to be in place for the procedure.         The patient's vital signs were carefully monitored before, throughout, and after the procedure.     In the fluoroscopy suite the patient was placed in a prone position, a pillow placed underneath their umbilicus. The skin was prepped and draped in the usual sterile fashion.     The fluoroscope was placed over the lumbar spine and adjusted into the proper AP/Oblique view to enter the transforaminal space just adjacent to the pedicle at the levels below. The targets for injection were then marked at the right L4-5. A 27g 1.5 inch needle was placed into the marked site, and 1mL of 1% Lidocaine was injected subcutaneously into the epidermal and dermal layers. The needle was removed intact.  A 22g 5 inch spinal needle was then placed and advanced under fluoroscopic guidance in an oblique view towards the epidural space of the levels noted above. The needle position was confirmed to not be past the 6 o'clock position in the AP view and it was in the neuroforamen in the lateral view.        The fluoroscope was was then adjusted over the lumbar spine and adjusted into the proper AP/Oblique view to enter the transforaminal space adjacent to the pedicle at the RIGHT  L5-S1. A 27g 1.5 inch needle  was placed into the marked site, and 1mL of 1% Lidocaine was injected subcutaneously into the epidermal and dermal layers. The needle was removed intact.  A 22g 5 inch spinal needle was then placed and advanced under fluoroscopic guidance in an oblique view towards the epidural space of the levels noted above. The needle position was confirmed to not be past the 6 o'clock position in the AP view and it was in the neuroforamen in the lateral view.     This was a challenging procedure given the patients Body mass index is 31.25 kg/m².. This required longer needles.  A typical procedure such as this would require 25g 3.5 inch needles.  This procedure required 22g 5 inch needles.  The patient also had significant scar tissue secondary to previous surgery.  This added to the mental effort for complexity this procedure.  This also made acquiring fluoroscopic images more time-consuming and challenging.  Final fluoroscopic images were obtained and saved.     Under live fluoroscopic guidance in the AP view, contrast dye was used to highlight the epidural space spread of each level above. Final fluoroscopic images were saved.  Following negative aspiration, 1mL of 1% lidocaine preservative free with 5mg dexamethasone   was then injected at each level above, and the needles were removed intact after restyleted. The patient's back was covered with a 4x4 gauze, the area was cleansed with sterile normal saline, and a dressing was applied. There were no complications noted.     The patient was then evaluated post-procedure, and was hemodynamically stable prior to leaving the post-operative care unit.     The patient had 100% pain relief postprocedure  Preprocedural pain: 5/10 NRS  Postprocedural pain: 0/10 NRS    Follow-up as scheduled    Jack Larson MD  Interventional Spine and Pain  Physical Medicine and Rehabilitation  [facility]          CPT codes  Transforaminal epidural injection- lumbar or sacral (first level):   07342-69  Transforaminal epidural injection- lumbar or sacral (each additional level):  20776-59

## 2024-11-12 ENCOUNTER — TELEPHONE (OUTPATIENT)
Dept: PHYSICAL MEDICINE AND REHAB | Facility: MEDICAL CENTER | Age: 59
End: 2024-11-12
Payer: COMMERCIAL

## 2024-11-13 NOTE — TELEPHONE ENCOUNTER
Called for Post -SP check up: RIGHT L4-5 and L5-S1 transforaminal epidural steroid injection with fluoroscopic guidance     Change in pain: Yes    Concerns? No    Confirmed [facility] appt? Yes

## 2024-12-16 ENCOUNTER — APPOINTMENT (OUTPATIENT)
Dept: PHYSICAL MEDICINE AND REHAB | Facility: MEDICAL CENTER | Age: 59
End: 2024-12-16
Payer: COMMERCIAL

## 2024-12-16 VITALS
DIASTOLIC BLOOD PRESSURE: 87 MMHG | OXYGEN SATURATION: 96 % | HEIGHT: 72 IN | SYSTOLIC BLOOD PRESSURE: 118 MMHG | TEMPERATURE: 96.8 F | BODY MASS INDEX: 29.8 KG/M2 | HEART RATE: 65 BPM | WEIGHT: 220 LBS

## 2024-12-16 DIAGNOSIS — M54.16 LUMBAR RADICULOPATHY: ICD-10-CM

## 2024-12-16 DIAGNOSIS — Z71.82 EXERCISE COUNSELING: ICD-10-CM

## 2024-12-16 DIAGNOSIS — M48.061 SPINAL STENOSIS OF LUMBAR REGION, UNSPECIFIED WHETHER NEUROGENIC CLAUDICATION PRESENT: ICD-10-CM

## 2024-12-16 DIAGNOSIS — Z98.890 HISTORY OF LUMBOSACRAL SPINE SURGERY: ICD-10-CM

## 2024-12-16 DIAGNOSIS — E66.9 OBESITY (BMI 30-39.9): ICD-10-CM

## 2024-12-16 PROCEDURE — 3079F DIAST BP 80-89 MM HG: CPT | Performed by: PHYSICAL MEDICINE & REHABILITATION

## 2024-12-16 PROCEDURE — 1125F AMNT PAIN NOTED PAIN PRSNT: CPT | Performed by: PHYSICAL MEDICINE & REHABILITATION

## 2024-12-16 PROCEDURE — 3074F SYST BP LT 130 MM HG: CPT | Performed by: PHYSICAL MEDICINE & REHABILITATION

## 2024-12-16 PROCEDURE — 99214 OFFICE O/P EST MOD 30 MIN: CPT | Performed by: PHYSICAL MEDICINE & REHABILITATION

## 2024-12-16 ASSESSMENT — PATIENT HEALTH QUESTIONNAIRE - PHQ9: CLINICAL INTERPRETATION OF PHQ2 SCORE: 0

## 2024-12-16 ASSESSMENT — FIBROSIS 4 INDEX: FIB4 SCORE: 1.13

## 2024-12-16 ASSESSMENT — PAIN SCALES - GENERAL: PAINLEVEL_OUTOF10: 2=MINIMAL-SLIGHT

## 2024-12-17 NOTE — PROGRESS NOTES
Follow up patient note  Interventional spine and Pain  Physiatry (physical medicine and  Rehabilitation)       Chief complaint:   Chief Complaint   Patient presents with    Follow-Up     Back pain          HISTORY    Please see new patient note by Dr Larson,  for more details.     HPI  Patient identification: Zachery Esquivel ,  1965,   With Diagnoses of Lumbar radiculopathy, Spinal stenosis of lumbar region, History of lumbosacral spine surgery L4-5 fusion done by Dr Barrie Espinoza, BMI 30-39.9, and Exercise counseling were pertinent to this visit.       Procedures:  10/12/2020 bilateral L3-4 transforaminal epidural steroid injection with 80% pain relief post procedure   2021 bilateral L3-4 transforaminal epidural steroid injection 90% improvement for 1 year  2024 bilateral L3-4 transforaminal epidural steroid injection 100% improvement in the radiating pain on the left side as well as to the anterior aspect on the right side.  No improvement of the posterior radiating pain on the right side.  2024 right L4-5 and L5-S1 transforaminal epidural steroid injection     Medications tried oxycodone, Zanaflex, Flexeril, Medrol Dosepak, diclofenac, Robaxin      Verbal consent was obtained for Tonny copilot : Yes      History of Present Illness  From the previous visit  The patient is a 58-year-old male here for follow-up.    His pain has changed after the epidural steroid injection. He does not have pain radiating down the anterior aspect of the legs, but he continues to have pain radiating down the posterior aspect, mainly on the right lower extremity down the posterior aspect of the right leg. There is also low back pain that is aching in quality, rated 9 out of 10 in intensity, and this is more apparent now. The pain is constant and worsens with sitting, standing, and walking.    He reports experiencing tightness, stiffness, and soreness in his lower back and right side. Occasionally, he  experiences cramps in his calf, including a week-long episode of calf cramp. His pain intensifies at night, disrupting his sleep. His left side, however, is doing well.    He has been to physical therapy in the past and has done a home exercise program for the past 6 months. He has tried medication management, including Robaxin, diclofenac, acetaminophen, and other medications.    From this visit  The patient is a 58-year-old male presenting for a follow-up visit.    He is status post right L4-5 and L5-S1 transforaminal epidural steroid injection performed on 11/08/2024. Post-procedure, he reports a significant reduction in pain, with a previous intensity of 9 out of 10 on the numeric rating scale now reduced to 2 out of 10, indicating an 80% improvement. Despite the improvement, he continues to experience low back pain with radiculopathy extending bilaterally down the legs, though this has markedly improved. The patient adheres to a home exercise regimen and attends physical therapy sessions at [place], reporting substantial progress since the last intervention. He describes persistent muscle tightness in the calves and hamstrings, exacerbated by prolonged ambulation, which he attributes to his underlying back pathology. This tightness becomes more pronounced during his daily walks of 1.5 to 2 miles. Nevertheless, he notes gradual improvement with ongoing physical therapy. His sleep quality has improved, and he can now lie supine without discomfort. He is able to stand, walk, and sit for approximately 30 minutes to an hour before needing to change positions due to back tightness. He can ambulate for 45 minutes without significant discomfort but experiences subsequent leg tightness. He attends physical therapy sessions twice weekly, which he finds beneficial. The patient expresses concern regarding the long-term management of his condition and inquires about the potential for radiofrequency ablation (RFA) as an  alternative to ongoing injections.           ROS Red Flags :   Fever, Chills, Sweats: Denies  Involuntary Weight Loss: Denies  Bowel/Bladder Incontinence: Denies  Saddle Anesthesia: Denies        PMHx:   Past Medical History:   Diagnosis Date    Low back pain     S/P laparoscopic fundoplication        PSHx:   Past Surgical History:   Procedure Laterality Date    INJECTION, STEROID, SPINE, LUMBAR, EPIDURAL Right 11/8/2024    Procedure: RIGHT L4-5 and L5-S1 transforaminal epidural steroid injection with fluoroscopic guidance;  Surgeon: Jack Larson M.D.;  [facility];  Service: Pain Management    [place] NJX AA&/STRD TFRML EPI LUMBAR/SACRAL 1 LEVEL Bilateral 9/11/2024    Procedure: BILATERAL L3-4 transforaminal epidural steroid injection with fluoroscopic guidance…..Note: 22-5;  Surgeon: Jack Larson M.D.;  Location: [facility];  Service: Pain Management    [place] NJX AA&/STRD TFRML EPI LUMBAR/SACRAL 1 LEVEL Bilateral 6/21/2021    Procedure: BILATERAL L3-4 transforaminal epidural steroid injection with fluoroscopic guidance;  Surgeon: Jack Larson M.D.;  Location: [facility];  Service: Pain Management    [place] NJX AA&/STRD TFRML EPI LUMBAR/SACRAL 1 LEVEL Bilateral 10/12/2020    Procedure: INJECTION, SPINE, LUMBOSACRAL, EPIDURAL, 1 LEVEL, TRANSFORAMINAL APPROACH;  Surgeon: Jack Larson M.D.;  Location: [facility];  Service: Pain Management    ABDOMINAL EXPLORATION      APPENDECTOMY      CHOLECYSTECTOMY      LUMBAR LAMINECTOMY DISKECTOMY      l4-5 with fusion    NISSEN FUNDOPLICATION LAPAROSCOPIC         Family history   Family History   Problem Relation Age of Onset    Diabetes Father     Breast Cancer Sister 35    Diabetes Paternal Grandmother     Heart Disease Neg Hx     Stroke Neg Hx     Hypertension Neg Hx     Hyperlipidemia Neg Hx     Colorectal Cancer Neg Hx          Medications:   Outpatient Medications Marked as Taking for  "the 12/16/24 encounter (Office Visit) with Jack Larson M.D.   Medication Sig Dispense Refill    diclofenac DR (VOLTAREN) 50 MG Tablet Delayed Response Take 50 mg by mouth 2 times a day.      methocarbamol (ROBAXIN) 750 MG Tab       doxycycline (VIBRAMYCIN) 100 MG Cap       lidocaine (LIDODERM) 5 % Patch APPLY 2 PATCHES TO SKIN ONCE A DAY FOR PAIN **APPLY AND PRESS FIRMLY FOR 10-15 SECONDS, KEEP ON FOR 12 HOURS, THEN REMOVE FOR 12 HOURS**      testosterone cypionate (DEPO-TESTOSTERONE) 200 MG/ML injection 200 mg ( 1 ml ) intramuscular every 7 days for 90 days      vitamin D2, Ergocalciferol, (DRISDOL) 1.25 MG (94524 UT) Cap capsule Take 1 Capsule by mouth every 7 days. 12 Capsule 3    NEEDLE, DISP, 25 G 25G X 5/8\" Misc Please dispense 3-month supply, 3 needles x90 days for vitamin B12 injections.  Thank you 3 Each 3    Continuous Blood Gluc  (FREESTYLE KAILYN 14 DAY READER) Device 1 APPLICATION. EVERY 14 DAYS. 3 Each 3    Continuous Blood Gluc Sensor (FREESTYLE KAILYN 14 DAY SENSOR) Misc 1 Application. every 14 days. 4 Each 3    Continuous Blood Gluc  (FREESTYLE KAILYN 2 READER) Device 3 mos supply please 1 Each 1    Continuous Blood Gluc Sensor (FREESTYLE KAILYN 2 SENSOR) Misc 3 mos supply please 1 Each 1        Current Outpatient Medications on File Prior to Visit   Medication Sig Dispense Refill    diclofenac DR (VOLTAREN) 50 MG Tablet Delayed Response Take 50 mg by mouth 2 times a day.      methocarbamol (ROBAXIN) 750 MG Tab       doxycycline (VIBRAMYCIN) 100 MG Cap       lidocaine (LIDODERM) 5 % Patch APPLY 2 PATCHES TO SKIN ONCE A DAY FOR PAIN **APPLY AND PRESS FIRMLY FOR 10-15 SECONDS, KEEP ON FOR 12 HOURS, THEN REMOVE FOR 12 HOURS**      testosterone cypionate (DEPO-TESTOSTERONE) 200 MG/ML injection 200 mg ( 1 ml ) intramuscular every 7 days for 90 days      vitamin D2, Ergocalciferol, (DRISDOL) 1.25 MG (79289 UT) Cap capsule Take 1 Capsule by mouth every 7 days. 12 Capsule 3    NEEDLE, DISP, " "25 G 25G X 5/8\" Misc Please dispense 3-month supply, 3 needles x90 days for vitamin B12 injections.  Thank you 3 Each 3    Continuous Blood Gluc  (FREESTYLE KAILYN 14 DAY READER) Device 1 APPLICATION. EVERY 14 DAYS. 3 Each 3    Continuous Blood Gluc Sensor (FREESTYLE KAILYN 14 DAY SENSOR) Misc 1 Application. every 14 days. 4 Each 3    Continuous Blood Gluc  (FREESTYLE KAILYN 2 READER) Device 3 mos supply please 1 Each 1    Continuous Blood Gluc Sensor (FREESTYLE KAILYN 2 SENSOR) Misc 3 mos supply please 1 Each 1    clindamycin (CLEOCIN) 1 % Solution APPLY A SMALL AMOUNT TO AFFECTED AREA(S) TWICE A DAY FOR ROSACEA      Tretinoin, Facial Wrinkles, 0.05 % Cream Apply  topically.      fluticasone (FLONASE) 50 MCG/ACT nasal spray Administer 1 Spray into affected nostril(S) every day.       No current facility-administered medications on file prior to visit.         Allergies:   Allergies   Allergen Reactions    Penicillins Rash     .    Tylenol      Can't take due to elevated liver enzymes    Bactrim [Sulfamethoxazole W-Trimethoprim]      Itching rash palms and soles       Social Hx:   Social History     Socioeconomic History    Marital status:      Spouse name: Not on file    Number of children: Not on file    Years of education: Not on file    Highest education level: Associate degree: academic program   Occupational History    Not on file   Tobacco Use    Smoking status: Never    Smokeless tobacco: Never   Vaping Use    Vaping status: Never Used   Substance and Sexual Activity    Alcohol use: Yes     Alcohol/week: 1.0 oz     Types: 2 Glasses of wine per week     Comment: Occasionally    Drug use: No    Sexual activity: Yes     Partners: Female   Other Topics Concern     Service No    Blood Transfusions Yes    Caffeine Concern No    Occupational Exposure No    Hobby Hazards No    Sleep Concern Yes    Stress Concern Yes    Weight Concern No    Special Diet No    Back Care Yes     Comment: back " brace    Exercise Yes    Bike Helmet No    Seat Belt Yes    Self-Exams No   Social History Narrative    Not on file     Social Drivers of Health     Financial Resource Strain: Low Risk  (4/9/2023)    Overall Financial Resource Strain (CARDIA)     Difficulty of Paying Living Expenses: Not hard at all   Food Insecurity: No Food Insecurity (4/9/2023)    Hunger Vital Sign     Worried About Running Out of Food in the Last Year: Never true     Ran Out of Food in the Last Year: Never true   Transportation Needs: No Transportation Needs (4/9/2023)    PRAPARE - Transportation     Lack of Transportation (Medical): No     Lack of Transportation (Non-Medical): No   Physical Activity: Insufficiently Active (4/9/2023)    Exercise Vital Sign     Days of Exercise per Week: 2 days     Minutes of Exercise per Session: 30 min   Stress: No Stress Concern Present (4/9/2023)    [employer] [place] [employer] - Occupational Stress Questionnaire     Feeling of Stress : Only a little   Social Connections: Socially Integrated (4/9/2023)    Social Connection and Isolation Panel [NHANES]     Frequency of Communication with Friends and Family: Once a week     Frequency of Social Gatherings with Friends and Family: Twice a week     Attends Orthodoxy Services: 1 to 4 times per year     Active Member of Clubs or Organizations: Yes     Attends Club or Organization Meetings: More than 4 times per year     Marital Status:    Intimate Partner Violence: Not on file   Housing Stability: Low Risk  (4/9/2023)    Housing Stability Vital Sign     Unable to Pay for Housing in the Last Year: No     Number of Places Lived in the Last Year: 1     Unstable Housing in the Last Year: No         EXAMINATION     Physical Exam:   Vitals: /87 (BP Location: Right arm, Patient Position: Sitting, BP Cuff Size: Large adult)   Pulse 65   Temp 36 °C (96.8 °F) (Temporal)   Ht 1.829 m (6')   Wt 99.8 kg (220 lb)   SpO2 96%     Constitutional:   Body  Habitus: Body mass index is 29.84 kg/m².  Cooperation: Fully cooperates with exam  Appearance: Well-groomed no disheveled    Respiratory-  breathing comfortable on room air, no audible wheezing  Cardiovascular- capillary refills less than 2 seconds. No lower extremity edema is noted.   Psychiatric- alert and oriented ×3. Normal affect.    MSK and Neuro: -    Physical Exam         Thoracic/Lumbar Spine/Sacral Spine/Hips   There are no signs of infection around the injection sites.   full  active range of motion with flexion, lateral flexion, and rotation bilaterally.   There is full  active range of motion with lumbar extension.    There is no  pain with lumbar extension.   There is no pain with facet loading maneuver (extension rotation) with axial low back pain on the BILATERAL side(s)       Palpation:   No tenderness to palpation in midline at T1-T12 levels. No tenderness to palpation in the left and right of the midline T1-L5, NEGATIVE for tenderness to palpation to the para-midline region in the lower lumbar levels.  palpation over SI joint: negative bilaterally    palpation in hip or over the gluteus medius tendon insertion: negative bilaterally      Lumbar spine Special tests  Neuro tension  Straight leg test negative bilaterally    Slump test negative bilaterally      Key points for the international standards for neurological classification of spinal cord injury (ISNCSCI) to light touch.     Dermatome R L                                      L2 2 2   L3 2 2   L4 2 2   L5 2 2   S1 2 2   S2 2 2         Motor Exam Lower Extremities    ? Myotome R L   Hip flexion L2 5 5   Knee extension L3 5 5   Ankle dorsiflexion L4 5 5   Toe extension L5 5 5   Ankle plantarflexion S1 5 5         MEDICAL DECISION MAKING    DATA    Labs:   Lab Results   Component Value Date/Time    SODIUM 140 05/16/2024 11:24 AM    POTASSIUM 4.2 05/16/2024 11:24 AM    CHLORIDE 106 05/16/2024 11:24 AM    CO2 23 05/16/2024 11:24 AM    GLUCOSE 102  "(H) 05/16/2024 11:24 AM    BUN 15 05/16/2024 11:24 AM    CREATININE 1.00 05/16/2024 11:24 AM        No results found for: \"PROTHROMBTM\", \"INR\"     Lab Results   Component Value Date/Time    WBC 7.0 05/16/2024 11:24 AM    RBC 5.66 05/16/2024 11:24 AM    HEMOGLOBIN 18.1 (H) 05/16/2024 11:24 AM    HEMATOCRIT 52.0 05/16/2024 11:24 AM    MCV 91.9 05/16/2024 11:24 AM    MCH 32.0 05/16/2024 11:24 AM    MCHC 34.8 05/16/2024 11:24 AM    MPV 10.6 05/16/2024 11:24 AM    NEUTSPOLYS 60.50 06/07/2022 08:39 AM    LYMPHOCYTES 22.90 06/07/2022 08:39 AM    MONOCYTES 10.50 06/07/2022 08:39 AM    EOSINOPHILS 4.90 06/07/2022 08:39 AM    BASOPHILS 0.90 06/07/2022 08:39 AM        Lab Results   Component Value Date/Time    HBA1C 5.2 05/16/2024 11:24 AM          Imaging:   I personally reviewed following images    MRI lumbar spine 5/16/2024  Posterior fusion L4-5.  Left paracentral disc bulge at L5-S1 which is adjacent to the descending nerve roots.  Facet arthropathy bilaterally at L5-S1.  Facet arthropathy bilaterally at L3-4.  There is severe central canal stenosis at L3-4.  There is foraminal stenosis bilaterally at L3-4.    MRI lumbar spine 9/27/2020  At L5-S1 there is a disc protrusion.  This is causing mild to moderate central canal stenosis and moderate bilateral stenosis right worse than left.  History of L4-5 posterior fusion.  At L3-4 there is moderate to severe central canal stenosis and bilateral neuroforaminal stenosis.  Facet arthropathy at L3-4 and L5-S1.  There are no acute fractures.     I reviewed the fluoroscopic images from 10/12/2020 showing successful bilateral L3-4 transforaminal epidural steroid injection.  I reviewed these with the patient on 11/5/2020.      Results    Imaging  MRI shows stenosis at L3-4.       I reviewed the following radiology reports    MRI lumbar spine 5/16/2024 T12-L3 neuroforamina and spinal canal are patent.  L3-4 small to moderate circumferential disc bulge with bilateral facet arthrosis " ligamentum flavum hypertrophy severe narrowing of the spinal canal.  AP thecal sac diameter measures 5 mm.  Neuroforamina are patent.  L4-5 postsurgical changes as described bilateral facet arthrosis.  Neuroforamina and spinal canal are patent  L5-S1 small to moderate circumferential disc bulge with superimposed left paracentral disc protrusion.  Bilateral facet arthrosis.  Mild narrowing of the right neuroforamen.  There is also narrowing of the left lateral recess containing the traversing left S1 nerve root.  Impression  Degenerative and postsurgical changes as described.  Including left lateral recess narrowing at L5-S1 and severe spinal canal stenosis at L3-4.          Results for orders placed during the hospital encounter of 20    MR-LUMBAR SPINE-W/O    Impression  1.  L3-4 mild-moderate spinal stenosis    2.  L5-S1 left paracentral disc extrusion moderately narrowing the left lateral recess and foramen    3.  L4-5 postoperative changes appear within normal limits                                                                                                                 Results for orders placed during the hospital encounter of 10/15/20    DX-LUMBAR SPINE-4+ VIEWS    Impression  1.  No compression deformity or acute fracture is identified.    2.  Degenerative disc disease and facet arthropathy is most prominent in the lower lumbar spine. Posterior fusion is noted at L4-L5.    3.  No focal instability is noted on flexion extension views.                        DIAGNOSIS   Visit Diagnoses     ICD-10-CM   1. Lumbar radiculopathy  M54.16   2. Spinal stenosis of lumbar region  M48.061   3. History of lumbosacral spine surgery L4-5 fusion done by Dr Barrie Espinoza  Z98.890   4. BMI 30-39.9  E66.9   5. Exercise counseling  Z71.82         ASSESSMENT and PLAN:     Zachery Astudillo Alvin  1965 male      Zachery was seen today for follow-up.    Diagnoses and all orders for this visit:    Lumbar  radiculopathy    Spinal stenosis of lumbar region    History of lumbosacral spine surgery L4-5 fusion done by Dr Barrie Espinoza    BMI 30-39.9    Exercise counseling        Assessment & Plan  Post-procedural follow-up subsequent to a right L4-5 and L5-S1 transforaminal epidural steroid injection.  He reports significant improvement in pain, with an 80% reduction noted. The pain, previously rated at 9 out of 10, is now at 2 out of 10 on the numeric rating scale. He continues to experience some tightness in the calves and hamstrings, especially after walking long distances, but this is being managed with physical therapy. An order for the renewal of his physical therapy sessions  will be placed. He is advised to continue with his home exercise program and physical therapy. If the pain recurs, he should contact the office for further evaluation and potential repeat injection. If the injections are no longer effective, additional surgery may be considered.    Medications: Continue diclofenac and methocarbamol during flares of pain    PROCEDURE  The patient underwent a right L4-5 and L5-S1 transforaminal epidural steroid injection on 11/08/2024.        Follow up: After the above procedure    Thank you for allowing me to participate in the care of this patient. If you have any questions please not hesitate to contact me.             Please note that this dictation was created using voice recognition software. I have made every reasonable attempt to correct obvious errors but there may be errors of grammar and content that I may have overlooked prior to finalization of this note.      Jack Larson MD  Interventional Spine and Sports Physiatry  Physical Medicine and Rehabilitation  [facility]

## 2025-02-06 ENCOUNTER — APPOINTMENT (OUTPATIENT)
Dept: URBAN - METROPOLITAN AREA CLINIC 36 | Facility: CLINIC | Age: 60
Setting detail: DERMATOLOGY
End: 2025-02-06

## 2025-02-06 DIAGNOSIS — L71.1 RHINOPHYMA: ICD-10-CM

## 2025-02-06 PROCEDURE — ? SCAR REVISION

## 2025-02-06 PROCEDURE — 13151 CMPLX RPR E/N/E/L 1.1-2.5 CM: CPT

## 2025-02-06 ASSESSMENT — LOCATION DETAILED DESCRIPTION DERM: LOCATION DETAILED: NASAL INFRATIP

## 2025-02-06 ASSESSMENT — LOCATION SIMPLE DESCRIPTION DERM: LOCATION SIMPLE: NOSE

## 2025-02-06 ASSESSMENT — LOCATION ZONE DERM: LOCATION ZONE: NOSE

## 2025-02-06 NOTE — PROCEDURE: SCAR REVISION
Discharge instructions given. Patient verbalized understanding. Return to [facility] in 1 week.   Called/faxed orders to  [facility]
Detail Level: Detailed
Anesthesia Type: 1% lidocaine with epinephrine
Hemostasis: Electrocautery
Estimated Blood Loss (Cc): minimal
Bill For Surgical Tray: no
Flap Billing Options: Measure Secondary Defect Directly
Repair Type: Complex Repair
Simple / Intermediate / Complex Repair - Final Wound Length In Cm: 1.5
Deep Sutures: 5-0 Vicryl
Epidermal Sutures: 5-0 Fast Absorbing Gut
Epidermal Closure: simple interrupted
Suture Removal: 7 days
Wound Care: Petrolatum
Skin Substitute Units (Will Override Primary Defect Units If Greater Than 0): 0
Consent: The rationale for scar revision was explained to the patient and consent was obtained. The risks, benefits and alternatives to therapy were discussed in detail. Specifically, the risks of infection, scarring, bleeding, prolonged wound healing, incomplete removal, allergy to anesthesia, nerve injury and recurrence were addressed. Prior to the procedure, the treatment site was clearly identified and confirmed by the patient. All components of Universal Protocol/PAUSE Rule completed.
Post-Care Instructions: I reviewed with the patient in detail post-care instructions. Patient is not to engage in any heavy lifting, exercise, or swimming for the next 14 days. Should the patient develop any fevers, chills, bleeding, severe pain patient will contact the office immediately.

## 2025-02-06 NOTE — HPI: OTHER
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 This appears to be excess cartilage that was previously obscured by the overgrowth of sebaceous gland, but is now prominent.
Please Describe Your Condition:: The bump is firm and causing pressure on the nasal tip

## 2025-02-14 ENCOUNTER — APPOINTMENT (OUTPATIENT)
Dept: URBAN - METROPOLITAN AREA CLINIC 36 | Facility: CLINIC | Age: 60
Setting detail: DERMATOLOGY
End: 2025-02-14

## 2025-02-14 DIAGNOSIS — Z41.9 ENCOUNTER FOR PROCEDURE FOR PURPOSES OTHER THAN REMEDYING HEALTH STATE, UNSPECIFIED: ICD-10-CM

## 2025-02-14 DIAGNOSIS — Z48.02 ENCOUNTER FOR REMOVAL OF SUTURES: ICD-10-CM

## 2025-02-14 PROCEDURE — ? SUTURE REMOVAL (GLOBAL PERIOD)

## 2025-02-14 PROCEDURE — ? LASER RESURFACING

## 2025-02-14 ASSESSMENT — LOCATION ZONE DERM: LOCATION ZONE: NOSE

## 2025-02-14 ASSESSMENT — LOCATION SIMPLE DESCRIPTION DERM: LOCATION SIMPLE: NOSE

## 2025-02-14 ASSESSMENT — LOCATION DETAILED DESCRIPTION DERM: LOCATION DETAILED: NASAL TIP

## 2025-02-14 NOTE — PROCEDURE: SUTURE REMOVAL (GLOBAL PERIOD)
Detail Level: Detailed
Add 76588 Cpt? (Important Note: In 2017 The Use Of 99622 Is Being Tracked By Cms To Determine Future Global Period Reimbursement For Global Periods): no

## 2025-02-14 NOTE — PROCEDURE: LASER RESURFACING
Power (Powell): 40
Wavelength: 10,600nm
Laser Type: CO2Re laser (Deep)
Corneal Shield Text: A corneal shield was inserted after appropriate application of topical anesthesia.
Detail Level: Detailed
Was A Corneal Shield Used?: No
Number Of Passes: 2
Energy(Mj): 5
Pulse Duration (Usec): 0
Consent: Written consent obtained, risks reviewed including but not limited to crusting, scabbing, blistering, scarring, darker or lighter pigmentary change, incomplete improvement of dyschromia, wrinkles, prolonged erythema and facial swelling, infection and bleeding.
Treatment Number: 1
Post-Care Instructions: I reviewed with the patient in detail post-care instructions. Patient should avoid sun until area fully healed. Pt should apply vaseline to treated areas, and remove crusts gently with water-vinegar soaks.